# Patient Record
Sex: FEMALE | Race: WHITE | NOT HISPANIC OR LATINO | Employment: OTHER | URBAN - METROPOLITAN AREA
[De-identification: names, ages, dates, MRNs, and addresses within clinical notes are randomized per-mention and may not be internally consistent; named-entity substitution may affect disease eponyms.]

---

## 2017-04-20 ENCOUNTER — OFFICE VISIT (OUTPATIENT)
Dept: OPHTHALMOLOGY | Facility: CLINIC | Age: 72
End: 2017-04-20
Payer: MEDICARE

## 2017-04-20 DIAGNOSIS — H25.013 CATARACT CORTICAL, SENILE, BILATERAL: ICD-10-CM

## 2017-04-20 DIAGNOSIS — E11.9 DIABETES MELLITUS TYPE 2 WITHOUT RETINOPATHY: Primary | ICD-10-CM

## 2017-04-20 DIAGNOSIS — Z13.5 GLAUCOMA SCREENING: ICD-10-CM

## 2017-04-20 DIAGNOSIS — H52.7 REFRACTIVE ERROR: ICD-10-CM

## 2017-04-20 DIAGNOSIS — H53.001 AMBLYOPIA, RIGHT: ICD-10-CM

## 2017-04-20 PROCEDURE — 92015 DETERMINE REFRACTIVE STATE: CPT | Mod: S$GLB,,, | Performed by: OPTOMETRIST

## 2017-04-20 PROCEDURE — 99999 PR PBB SHADOW E&M-EST. PATIENT-LVL I: CPT | Mod: PBBFAC,,, | Performed by: OPTOMETRIST

## 2017-04-20 PROCEDURE — 92014 COMPRE OPH EXAM EST PT 1/>: CPT | Mod: S$GLB,,, | Performed by: OPTOMETRIST

## 2017-04-20 NOTE — PROGRESS NOTES
HPI     Last [facility] exam 08/25/2015  Diabetic/NIDDM  Amblyopia, OD  Cataract cortical, senile, bilateral  Cataract, nuclear, bilateral  Screening for glaucoma  RE  No visual complaints  Uses OTC Readers +3.00       Last edited by Sebastian Rodrigues MA on 4/20/2017  9:31 AM.         Assessment /Plan     For exam results, see Encounter Report.    Diabetes mellitus type 2 without retinopathy    Cataract, nuclear, bilateral    Cataract cortical, senile, bilateral    Amblyopia, right    Glaucoma screening    Refractive error      No diabetic retinopathy OU.  Moderate NS/cortical cataracts OU without complaint = discussed and will follow.  OD = amblyopia.  OH OK OU otherwise.  Spec Rx given.  RTC one year.

## 2017-06-05 ENCOUNTER — TELEPHONE (OUTPATIENT)
Dept: PODIATRY | Facility: CLINIC | Age: 72
End: 2017-06-05

## 2017-06-05 DIAGNOSIS — M79.671 RIGHT FOOT PAIN: Primary | ICD-10-CM

## 2017-06-05 NOTE — TELEPHONE ENCOUNTER
Returned 's call regarding her wanting to be seen for right foot pain (top of foot to her toes). I informed  that there are not any openings today and we are at [place] today and [place] tomorrow.  requested that she see someone else. After checking the schedules for other providers I informed  that there is an opening on tomorrow with  at 1:20PM.  stated she will go tomorrow and she will get an Xray before her appointment.  stated understanding and the call ended well.

## 2017-06-05 NOTE — TELEPHONE ENCOUNTER
----- Message from Vandana Lovelace sent at 6/5/2017  8:32 AM CDT -----  Contact: kathy   Feels like she needs to be seen today   Call back

## 2017-06-06 ENCOUNTER — HOSPITAL ENCOUNTER (OUTPATIENT)
Dept: RADIOLOGY | Facility: HOSPITAL | Age: 72
Discharge: HOME | End: 2017-06-06
Attending: PODIATRIST
Payer: MEDICARE

## 2017-06-06 ENCOUNTER — OFFICE VISIT (OUTPATIENT)
Dept: PODIATRY | Facility: CLINIC | Age: 72
End: 2017-06-06
Payer: MEDICARE

## 2017-06-06 VITALS
WEIGHT: 172.63 LBS | HEIGHT: 66 IN | HEART RATE: 84 BPM | SYSTOLIC BLOOD PRESSURE: 131 MMHG | BODY MASS INDEX: 27.74 KG/M2 | DIASTOLIC BLOOD PRESSURE: 74 MMHG

## 2017-06-06 DIAGNOSIS — M20.11 HALLUX ABDUCTO VALGUS, RIGHT: ICD-10-CM

## 2017-06-06 DIAGNOSIS — M19.079 ARTHRITIS, MIDFOOT: Primary | ICD-10-CM

## 2017-06-06 DIAGNOSIS — M79.671 RIGHT FOOT PAIN: ICD-10-CM

## 2017-06-06 PROCEDURE — 1125F AMNT PAIN NOTED PAIN PRSNT: CPT | Mod: S$GLB,,, | Performed by: PODIATRIST

## 2017-06-06 PROCEDURE — 73630 X-RAY EXAM OF FOOT: CPT | Mod: 26,RT,, | Performed by: RADIOLOGY

## 2017-06-06 PROCEDURE — 99999 PR PBB SHADOW E&M-EST. PATIENT-LVL IV: CPT | Mod: PBBFAC,,, | Performed by: PODIATRIST

## 2017-06-06 PROCEDURE — 1159F MED LIST DOCD IN RCRD: CPT | Mod: S$GLB,,, | Performed by: PODIATRIST

## 2017-06-06 PROCEDURE — 99214 OFFICE O/P EST MOD 30 MIN: CPT | Mod: S$GLB,,, | Performed by: PODIATRIST

## 2017-06-06 RX ORDER — METHYLPREDNISOLONE 4 MG/1
TABLET ORAL
Qty: 1 PACKAGE | Refills: 0 | Status: SHIPPED | OUTPATIENT
Start: 2017-06-06 | End: 2018-01-09

## 2017-06-06 RX ORDER — TRAMADOL HYDROCHLORIDE 50 MG/1
TABLET ORAL
Refills: 0 | COMMUNITY
Start: 2017-04-24 | End: 2017-06-06

## 2017-06-06 NOTE — PATIENT INSTRUCTIONS
GOUT      What Is Gout?  Gout is a disorder that results from the build-up of uric acid in the tissues or a joint. It most often affects the joint of the big toe.  Causes  Gout attacks are caused by deposits of crystallized uric acid in the joint. Uric acid is present in the blood and eliminated in the urine, but in people who have gout, uric acid accumulates and crystallizes in the joints. Uric acid is the result of the breakdown of purines, chemicals that are found naturally in our bodies and in food. Some people develop gout because their kidneys have difficulty eliminating normal amounts of uric acid, while others produce too much uric acid.  Gout occurs most commonly in the big toe because uric acid is sensitive to temperature changes. At cooler temperatures, uric acid turns into crystals. Since the toe is the part of the body that is farthest from the heart, its also the coolest part of the body - and, thus, the most likely target of gout. However, gout can affect any joint in the body.  The tendency to accumulate uric acid is often inherited. Other factors that put a person at risk for developing gout include: high blood pressure, diabetes, obesity, surgery, chemotherapy, stress, and certain medications and vitamins. For example, the bodys ability to remove uric acid can be negatively affected by taking aspirin, some diuretic medications (water pills), and the vitamin niacin (also called nicotinic acid). While gout is more common in men aged 40 to 60 years, it can occur in younger men as well as in women.  Consuming foods and beverages that contain high levels of purines can trigger an attack of gout. Some foods contain more purines than others and have been associated with an increase of uric acid, which leads to gout. You may be able to reduce your chances of getting a gout attack by limiting or avoiding shellfish, organ meats (kidney, liver, etc.), red wine, beer, and red meat.  Symptoms  An attack of  gout can be miserable, marked by the following symptoms:  Intense pain that comes on suddenly - often in the middle of the night or upon arising   Signs of inflammation such as redness, swelling, and warmth over the joint.   Diagnosis  To diagnose gout, the foot and ankle surgeon will ask questions about your personal and family medical history, followed by an examination of the affected joint. Laboratory tests and x-rays are sometimes ordered to determine if the inflammation is caused by something other than gout.  Treatment  Initial treatment of an attack of gout typically includes the following:  Medications. Prescription medications or injections are used to treat the pain, swelling, and inflammation.   Dietary restrictions. Foods and beverages that are high in purines should be avoided, since purines are converted in the body to uric acid.   Fluids. Drink plenty of water and other fluids each day, while also avoiding alcoholic beverages, which cause dehydration.   Immobilize and elevate the foot. Avoid standing and walking to give your foot a rest. Also, elevate your foot (level with or slightly above the heart) to help reduce swelling.   The symptoms of gout and the inflammatory process usually resolve in three to ten days with treatment. If gout symptoms continue despite the initial treatment, or if repeated attacks occur, see your primary care physician for maintenance treatment that may involve daily medication. In cases of repeated episodes, the underlying problem must be addressed, as the build-up of uric acid over time can cause arthritic damage to the joint.  Once diagnosed with gout, your doctor will recommend that you begin following a uric acid diet. Consuming a well-balanced diet, containing moderate amounts of all the main food groups, means you wont experience high uric acid levels, unless your kidneys are incapable of filtering the blood properly.  Receiving around 600 to 1000 milligrams of  puric acid from foods is considered a healthy amount for most people. However, overconsumption of certain foods may produce hyperuricemia without you being aware of it.  Foods high in puric acid that you should not include in a low purine diet are:     Anchovies, bakers and watkinss yeast, animal organs, sardines, Boletus mushrooms and gravies--more than 800 milligrams per three ounces  Foods containing up to 200 milligrams of puric acid per 3.5 ounces:  Turkey   Ham   Chicken   Bañuelos   Oatmeal   Spinach   Asparagus   Cauliflower   Navy and kidney beans   Pork   [place]   Oysters   Sunflower seeds   Brown shrimp   Sausage   Those who do not experience problems metabolizing uric acid or with kidney filtering functioning generally do not have to worry about the amount of high-purine foods they consume (up to a point, of course). However, those afflicted with extreme hyperuricemia need to adhere to a strict uric acid diet.  Foods containing amino acids (glycine, leucine, alanine, aspartic acid and glutamic acid) facilitate excretion of uric acid through the kidneys. Dairy foods and molasses, both part of a low purine diet, contain aspartic acid. Glutamic acid, produced by the body, assists in potassium transportation across the blood-brain barrier as well as to other parts of the body.  Low purine diet foods provide chemicals necessary in manufacturing glutamic acid within the body. Beans, nuts and whole wheat, all foods found in low purine foods, contain leucine, another essential amino acid contributing to decreased attacks of gouts by enhancing bone and muscle mass.  Low Purine Diet for Gout  All low-fat dairy products--although recent research discovered that including larger than normal amounts of low-fat dairy products in a diet decreases instances of gout by more than half, scientists are not certain why these foods produce this effect.  A list of other low-fat, low purine diet foods include:  Pudding   Peanut  butter   Low-fiber breads   Rice   Pasta   Low-fat cheese and ice cream   Soups containing no broth or meat extract   Sweet items in limited amounts   Fruits and vegetables   Red and white cabbage   Luncheon meat   Green olives   Sauerkraut   Tofu   [place] and hazelnuts   Figs   Be aware that breads, cereals and crackers advertising as being 100% wheat, stone-ground or multi-grain are generally not whole grain foods and may contain ingredients such as yeast, which increases uric acid levels and promotes formation of urate crystals.  Purchase only lean cuts of meat containing as little fillers or fat as possible when creating your low purine diet meal plan. In addition, poultry should be cooked and eaten without the skin, since the fattiest part of chicken or turkey is the skin. Instead of frying meat, bake, poach, broil or grill meat. If you prefer, meat substitutes are acceptable to include in a gout diet, such as those made with soybeans, egg substitutes, vegetable-protein tofu or legumes.    Importance of Cherries in a Low Purine Diet     Cherry juice and tart cherries (the [place] and [place] kind that are grown in [place]), contain potassium, an essential mineral necessary for optimal kidney functioning and regulating blood pressure. Because potassium is essential, this indicates that the body is incapable of chemically altering other substances to create potassium. Therefore, humans must then obtain potassium from other sources, such as cherries, to maintain good health.  Tart cherries and cherry juice keep urine pH at a slightly alkaline level, which inhibits accumulation of uric acid in the blood and consequential formation of uric acid crystals in joints. Cherries should definitely be a part of a low purine diet.  In addition, drinking cherry juice for gout reduces uric acid levels due to the presence of anthocyanins in the cherries. Extracted from berries and grapes, anthocyanin is an  antioxidant and bioflavonoid that contains antimicrobial, anti-inflammatory properties enhancing blood vessel and platelet functioning.   Gout sufferers should eat eight ounces or more of tart cherries each day to relieve pain and swelling of gout. Drinking the same amount of cherry juice instead of eating cherries will also significantly reduce inflammation.  Mulberries     Mulberries should also be included in a low purine diet because they contain healthy amounts of [place], a flavonoid attributed to relieving pain from inflammation. Although mulberries do not directly reduce the high levels of uric acid in a gout sufferers blood stream, they do alleviate pain as incidences of gout flare-ups are being treated.  In addition, mulberries also enhance capillary strength, which is often compromised when joints are inflamed. According to the book Vitamins and Minerals Demystified by Dr. Todd Reynolds since ascorbic acid slightly increases capillary fragility, this action of the bioflavonoids offsets this tendency. Consuming mulberries while on a low purine diet will counteract this issue precipitated by fruits being such an integral part of gout diet.  Other Flavonoid-Rich Foods  Gout sufferers who are sensitive to over the counter pain relievers such as ibuprofen or aspirin may find natural relief from flavonoid-rich, low purine diet foods such as:  Blueberries   Blackberries   Jacob and limes   Dark-colored beans   Cranberries   Green and red vegetables   Red and green onions   Effects of Coffee and Tea on Gout     Drinking coffee but not tea seems to correlate with a subsequent reduction of blood uric acid according to a report published in the June 2007 issue of Journal of Arthritis Care and Research. Researchers interviewed over 14,000 subjects regarding coffee and tea drinking habits and later evaluated their uric acid levels according to whether they were predominantly coffee or tea drinkers, or drank neither  one.  Those who drank four to five cups of coffee each day as part of a low purine diet indicated a decrease of 0.26 mg/dl serum uric acid in contrast to those who did not drink coffee at all.   Those who drank six cups of coffee per day experienced uric acid level reductions of 0.43 mg/dl.   Interestingly, researchers also investigated the effects of caffeine contained in beverages other than coffee and found drinks such as soda did not influence uric acid levels one way or the other. This may indicate that coffee contains a unique ingredient or property responsible for decreasing uric acid and alleviating gout symptoms.  Fish Oil as Part of a Low Purine Diet       A substantial amount of research has been accomplished regarding the strong anti-inflammatory properties of fish oil, or omega 3 fatty acids. By facilitating the production of prostanglandins, hormones responsible for reducing inflammation and regulating calcium dispersion, fish oil is a necessary part of a successful low purine diet intended to combat the pain of gout.  In addition, prostanglandins inhibit particular pro-inflammatory hormones that are part of the prostanglandin family. While fish oil wont relieve pain as quickly as NSAIDs (non-steroidal anti-inflammatory drugs such as aspirin or ibuprofen), they effectively work to reduce the presence of prostanglandins over longer periods of time, eliminating the need to take NSAIDs which often cause side effects.  Frequently, insulin resistance (metabolic syndrome), heart disease and kidney stones are co-morbidly occurring diseases with gout. Fish oil supplements taken in conjunction with a low purine diet can benefit these diseases as well by decreasing bad fats (triglycerides) and preventing platelet aggregation, which is a common cause of blocked vessels and heart attacks.   A few foods other than fish contain omega 3, which are also foods that can be included in a gout diet. Walnuts, flaxseeds,  flaxseed oil and eggs are all sources of omega-3 that do not contribute to uric acid levels in the body.   Folic Acid   Folic acid effectively neutralizes an enzyme called xanthine oxidase which is directly responsible for producing uric acid. By also regulating any uric acid already present in the blood, gout attacks are usually shorter and less painful. However, the recommended daily amounts of folic acid fall short of the 80 milligrams a day that is required to decrease blood uric acid. In addition, epileptics who take medication may find that folic acid interferes with the efficacy of that medication. Little research has been done regarding the effect of high doses of folic acid so including folic acid supplements in a low purine diet should be implemented under the supervision of a doctor.  When following a low purine diet for gout, remember to increase liquids to at least ten, eight ounce servings of water or juice daily. Liquids facilitate the elimination of uric acid by assisting the kidneys in the process of filtering and disposing of wastes.  Suggested Low Purine Diet Menus for Gout Diet  Diets to reduce uric acid are not as restrictive as diabetic or even weight management diets. However, if you are accustomed to eating high purine foods or drinking alcohol on a regular basis, this diet may represent a drastic change in your eating habits. However, to avoid painful attacks of gout that could eventually result in irreversible damage to joints and lifelong impairment of movement, reducing uric acid levels should be a priority in your life.  Low Purine Diet for Gout  This type of diet should include daily servings of:  Grain products--six to ten servings   Fruits--two to four servings   Vegetables--three servings   Low-fat dairy products--two servings   Protein-rich foods-one egg, one ounce of cheese, three ounces of cooked, low-purine meat and two tablespoons of peanut butter   When consuming foods included  in a uric acid diet you may eat as much as you like, as long as this does not contribute to weight gain. This poses a problem since many low purine foods such as white breads, pasta and cereals contain high levels of sugar and carbohydrates. A good meal plan to follow to reduce uric acid in the body would be:   Breakfast:  Bowl of cereal such as cornflakes or crisp rice   White bread toast, buttered with olive oil spread   Glass of skim milk   Cup of tea or coffee or small glass of cherry juice   Snack:   Low-fat cheese and saltines or grapes   Lunch:   Sliced meat sandwich (ham, chicken or turkey) on white bread or   Peanut butter sandwich on white bread   Fruit salad   Coffee, tea, water or cherry juice   Small slice of white cake or two peanut butter or sugar cookies   Dinner:   Grilled chicken breast   Pasta or rice   Carrots, cauliflower or asparagus   Water or cherry juice   Pudding made with low-fat milk   Snack:   Fruit chunks   Fresh vegetable mixture   Fresh berries   Many variations are possible with this uric acid diet. However, even though gout symptoms decrease, you need to follow a low purine diet as closely as possible to maintain successful control over uric acid levels.  How Much Fat in a Low Purine Diet?  While eating moderate to excessive amounts of saturated fat is detrimental to everyones health, it is especially damaging to gout sufferers because fat is directly correlated with weight gain, heart disease and exacerbated gout symptoms. However, small quantities of fat are necessary for optimal functioning of the nervous system as myelin, a form of fat, is essential for optimal signal transduction in the brain enhancing cognition abilities.  For this reason, gout sufferers should have one serving of one of the following per day:  2 tablespoons of half and half cream   1 tablespoon of cream cheese   1 teaspoon of butter   1 tablespoon of reduced fat mayonnaise   1 tablespoon of whole sesame  seeds   1 slice of diggs   1 tablespoon of sunflower or pumpkin seeds   1 teaspoon of trans fat-free margarine   Benefits of Dark Chocolate and Cocoa Powder  Research has discovered that chocolate and cocoa powder, especially dark chocolate, contains high amounts of flavonoids when compared to other foods rich in antioxidants. For this reason, an occasional small piece or two of dark chocolate or cup of cocoa should be included in a low purine diet. In fact, cocoa powder possesses the highest amount as a member of the chocolate family, containing ten times the amount of flavonoids found in cranberries and strawberries.  Chocolate is low in purines as well, but dont eat it excessively as it is high in empty calories and promotes weight gain unless you engage in regular exercise.   Prescription Treatments for Gout and Uric Acid Conditions  While a low purine diet for gout is one of the best defensive measures which those suffering from gout can pursue, physicians provide drugs to assist in alleviating gout symptoms. The principle medication used in treating gout is colchicine, a recently FDA approved medication naturally found in plants belong to the Colchicum family.  However, colchicine is known to produce side effects such as gastrointestinal disturbances and neutropenia, which is a reduction in a vital type of white blood cell. Doses must be closely monitored, as too high of a dose can profoundly weaken bone marrow and induce anemia. Colchicine toxicity is similar to arsenic poisoning, resulting in damage and failure of internal organs within 72 hours of ingestion.  Prognosis and Summary  A gout attack or flare-up generally diminishes within five to seven days without treatment, depending on severity of the attack and amount of uric acid in the blood. More than half of these will suffer another attack within twelve months of experiencing the first attack, unless treatment is pursued and a low purine diet is  adopted.  It is also important to note that developing gout also puts you at risk for diabetes mellitus, metabolic syndrome, hypertension, renal failure and cardiovascular disease. While some of these disorders may partly result from obesity or insulin resistance issues, the majority are a direct consequence of untreated hyperuricemia.  Destruction of joints is another consequence of elevated uric acid levels and continued accumulation of urate acid crystals within joints. Implementing gout prevention measures as quickly as possible is vital to eliminating uric acid from the blood and reducing the pain and swelling of gout.

## 2017-06-06 NOTE — PROGRESS NOTES
[facility]  PODIATRIC MEDICINE AND SURGERY  PROGRESS NOTE  6/6/2017    PODIATRY NOTE  PCP: Dr. Irma Ro MD    CHIEF COMPLAINT   Chief Complaint   Patient presents with    Foot Pain     Pain in dorsal aspect of right foot rated 10/10 currently       HPI  Tamy Allan is a 71 y.o. female who has a past medical history of Amblyopia; Arthritis; Cataract; Diabetes mellitus, type 2; Encounter for blood transfusion; Hyperlipidemia; Hypertension; and Refractive error.   Tamy presents to clinic today complaining of right foot pain.    Patient describes pain as:   Location: dorsum of right foot   Quality: throbbing   Severity:10/10  Duration: four day  Modifying Factors (Aggravating): weight bearing, pt states she woke up over weekend and had significant pain to top of her right foot with swelling and redness    She also complains about bunion pain. She did have bunionectomy surgery in 2015, but she states bunion is still present. She modifies pain with offloading.   Modifying Factors (Alleviating): tylenol     Patient denies other pedal complaints at this time.    No results found for: HGBA1C    PMH  Past Medical History:   Diagnosis Date    Amblyopia     OD    Arthritis     Cataract     Diabetes mellitus, type 2     Encounter for blood transfusion     Hyperlipidemia     Hypertension     Refractive error        PROBLEM LIST  Patient Active Problem List    Diagnosis Date Noted    Arthritis, midfoot - Right Foot 06/06/2017    HAV (hallux abducto valgus) 09/18/2015    Diabetes mellitus 07/09/2014       MEDS  Current Outpatient Prescriptions on File Prior to Visit   Medication Sig Dispense Refill    amlodipine (NORVASC) 10 MG tablet Take 10 mg by mouth once daily.      aspirin 81 MG Chew Take 81 mg by mouth once daily.      atorvastatin (LIPITOR) 40 MG tablet Take 40 mg by mouth once daily.      CALCIUM CARB/VIT D3/MINERALS (CALTRATE PLUS ORAL) Take by mouth.      carvedilol  (COREG) 25 MG tablet Take 25 mg by mouth 2 (two) times daily with meals.      CETIRIZINE HCL (ZYRTEC ORAL) Take by mouth.      CYANOCOBALAMIN, VITAMIN B-12, (VITAMIN B-12 ORAL) Take by mouth.      fluticasone 50 mcg/actuation DsDv Inhale into the lungs.      FLUZONE HIGH-DOSE 2015-16, PF, 180 mcg/0.5 mL Syrg   0    hydrALAZINE (APRESOLINE) 10 MG tablet Take 10 mg by mouth 3 (three) times daily.      hydrochlorothiazide (HYDRODIURIL) 25 MG tablet Take 25 mg by mouth once daily.      levothyroxine (SYNTHROID) 50 MCG tablet Take 50 mcg by mouth once daily.      meloxicam (MOBIC) 7.5 MG tablet       metformin (GLUCOPHAGE-XR) 500 MG 24 hr tablet       montelukast (SINGULAIR) 10 mg tablet Take 10 mg by mouth every evening.      MOVIPREP 100-7.5-2.691 gram PwPk   0    MULTIVITAMIN W-MINERALS/LUTEIN (CENTRUM SILVER ORAL) Take by mouth.      mupirocin (BACTROBAN) 2 % ointment   3    oxycodone-acetaminophen (PERCOCET) 5-325 mg per tablet Take 1 tablet by mouth every 4 (four) hours as needed for Pain. 30 tablet 0    quinapril (ACCUPRIL) 40 MG tablet Take 40 mg by mouth 2 (two) times daily.       [DISCONTINUED] metformin (GLUMETZA) 500 MG (MOD) 24 hr tablet Take 500 mg by mouth daily with breakfast.       No current facility-administered medications on file prior to visit.        Medication List with Changes/Refills   New Medications    METHYLPREDNISOLONE (MEDROL DOSEPACK) 4 MG TABLET    use as directed   Current Medications    AMLODIPINE (NORVASC) 10 MG TABLET    Take 10 mg by mouth once daily.    ASPIRIN 81 MG CHEW    Take 81 mg by mouth once daily.    ATORVASTATIN (LIPITOR) 40 MG TABLET    Take 40 mg by mouth once daily.    CALCIUM CARB/VIT D3/MINERALS (CALTRATE PLUS ORAL)    Take by mouth.    CARVEDILOL (COREG) 25 MG TABLET    Take 25 mg by mouth 2 (two) times daily with meals.    CETIRIZINE HCL (ZYRTEC ORAL)    Take by mouth.    CYANOCOBALAMIN, VITAMIN B-12, (VITAMIN B-12 ORAL)    Take by mouth.     FLUTICASONE 50 MCG/ACTUATION DSDV    Inhale into the lungs.    FLUZONE HIGH-DOSE 2015-16, PF, 180 MCG/0.5 ML SYRG        HYDRALAZINE (APRESOLINE) 10 MG TABLET    Take 10 mg by mouth 3 (three) times daily.    HYDROCHLOROTHIAZIDE (HYDRODIURIL) 25 MG TABLET    Take 25 mg by mouth once daily.    LEVOTHYROXINE (SYNTHROID) 50 MCG TABLET    Take 50 mcg by mouth once daily.    MELOXICAM (MOBIC) 7.5 MG TABLET        METFORMIN (GLUCOPHAGE-XR) 500 MG 24 HR TABLET        MONTELUKAST (SINGULAIR) 10 MG TABLET    Take 10 mg by mouth every evening.    MOVIPREP 100-7.5-2.691 GRAM PWPK        MULTIVITAMIN W-MINERALS/LUTEIN (CENTRUM SILVER ORAL)    Take by mouth.    MUPIROCIN (BACTROBAN) 2 % OINTMENT        OXYCODONE-ACETAMINOPHEN (PERCOCET) 5-325 MG PER TABLET    Take 1 tablet by mouth every 4 (four) hours as needed for Pain.    QUINAPRIL (ACCUPRIL) 40 MG TABLET    Take 40 mg by mouth 2 (two) times daily.    Discontinued Medications    METFORMIN (GLUMETZA) 500 MG (MOD) 24 HR TABLET    Take 500 mg by mouth daily with breakfast.    TRAMADOL (ULTRAM) 50 MG TABLET    TK 1 T PO  BID PRN P       PSH     Past Surgical History:   Procedure Laterality Date    CARDIAC CATHETERIZATION      with stents    HYSTERECTOMY          ALL  Review of patient's allergies indicates:   Allergen Reactions    Aspirin      Patient states that she has been taking aspirin regularly with no reactions       SOC     Social History   Substance Use Topics    Smoking status: Never Smoker    Smokeless tobacco: Not on file    Alcohol use No         FAMILY HX  History reviewed. No pertinent family history.         REVIEW OF SYSTEMS  Review of Systems   Constitutional: Negative for chills, fever and weight loss.   Respiratory: Positive for cough. Negative for shortness of breath.    Cardiovascular: Negative for claudication.   Musculoskeletal: Positive for joint pain.   Neurological: Negative for dizziness and tingling.         PHYSICAL EXAM  Vitals:    06/06/17  "1323   BP: 131/74   Pulse: 84   Weight: 78.3 kg (172 lb 9.9 oz)   Height: 5' 6" (1.676 m)   PainSc: 10-Worst pain ever   PainLoc: Foot       General: This patient is well-developed, well-nourished and appears stated age, well-oriented to person, place and time, and cooperative and pleasant on today's visit      LOWER EXTREMITY  Vascular exam:   · Dorsalis pedis and posterior tibial pulses palpable 2/4 bilaterally.   · Capillary refill time immediate to the toes.   · Feet are warm to the touch. Skin temperature warm to warm from proximally to distally   · There are varicosities, telangiectasias noted to bilateral foot and ankle regions.   · There areno ecchymoses noted to bilateral foot and ankle regions.   · There is mild dorsal right foot gross lower extremity edema.    Dermatologic exam:   · Skin moist with healthy texture and turgor.  · There are no open ulcerations, lacerations, or fissures to bilateral foot and ankle regions. There are no signs of infection as there is no erythema, no proximal-extending lymphangiitis, no fluctuance, or crepitus noted on palpation to bilateral foot and ankle regions.   · There is no interdigital maceration.   · There are no hyperkeratotic lesions noted to feet. Nails are well-trimmed.    Neurologic exam:  · Epicritic sensation is intact as the patient is able to sense light touch to bilateral foot and ankle regions.   · Achilles and patellar deep tendon reflexes intact  · Babinski reflex absent    Musculoskeletal/Orthopedic exam:   · There is pain with palpation of palpable prominence dorsum of right midfoot'  · There is mild prominence medial eminence with negative pain with palpation  · There is abnormal HAV right  · Muscle strength AT/EHL/EDL/PT: 5/5; Achilles/Gastroc/Soleus: 5/5; PB/PL: 5/5 Muscle tone is normal.  · Ankle joint ROM  B/L supple DF/PF, non-crepitus  · STJ ROM supple inv/ev, non crepitus     IMAGING   Reviewed by me and I agree with radiologist findings, 3 " views of foot/ankle, reveal:  No results found for this or any previous visit.        No results found for this or any previous visit.    No results found for this or any previous visit.     Results for orders placed during the hospital encounter of 06/06/17   X-Ray Foot Complete Right    Narrative Technique: AP, lateral, and oblique views were obtained of the right foot    Comparison: 10/15/2015    Findings: Postoperative changes again noted involving the distal 1st metatarsal.  There is suspected interval worsening hallux valgus deformity.  Moderate degenerative findings again noted involving the great toe MTP joint and scattered throughout the midfoot. No acute fractures or dislocations visualized.  No erosive osseous changes demonstrated.    Impression As above.             Electronically signed by: JOSE HELLER MD  Date:     06/06/17  Time:    12:46            ASSESSMENT  Encounter Diagnoses   Name Primary?    Hallux abducto valgus, right     Arthritis, midfoot - Right Foot Yes         PLAN  1. Patient was educated about clinical and imaging findings, and verbalizes understanding of above.  Arthritis, midfoot - Right Foot  -     Uric acid; Future; Expected date: 06/06/2017    Hallux abducto valgus, right    Other orders  -     methylPREDNISolone (MEDROL DOSEPACK) 4 mg tablet; use as directed  Dispense: 1 Package; Refill: 0      2. Treatment plan: discussed midfoot arthritis and treatment option with shoe modification, rockerbottom sole, Rx Medrol dosepak; discussed injection if symptoms fail to resolve or topical compound. Surgery as last resort  Reviewed xray images with patient and discussed indepth bunion deformity. Pain is intermittent and based on age and comorbidities I recommend shoe modification, orthoses, and palliative treatment     Will order Uric Acid ddx, gout  3. RTC  for follow up/evaluation as scheduled       Future Appointments  Date Time Provider Department Center   6/27/2017 11:40 AM  Ileana Nayak DPM [facility] POD [place]       Report Electronically Signed By:  Ileana Nayak DPM   Podiatric Medicine & Surgery  [facility]  6/6/2017

## 2017-06-07 ENCOUNTER — TELEPHONE (OUTPATIENT)
Dept: PODIATRY | Facility: CLINIC | Age: 72
End: 2017-06-07

## 2017-06-07 NOTE — TELEPHONE ENCOUNTER
Educated patient on gout labs reviewed by Dr. Nael DPM. Verbalized understanding. Encouraged to continue medication prescribed at last appointment and to follow up as recommended. Verbalized understanding.

## 2017-06-07 NOTE — TELEPHONE ENCOUNTER
pls call Mrs. Allan and inform her gout lab result is high. She is experiencing a gout attack and arthritis on her foot. The medication that I prescribed will help with her pain. She should refer to the gout information sheet that was provided to her at her visit. It was nice meeting her! I will see her in 2 weeks

## 2017-06-14 ENCOUNTER — TELEPHONE (OUTPATIENT)
Dept: PODIATRY | Facility: CLINIC | Age: 72
End: 2017-06-14

## 2017-06-14 NOTE — TELEPHONE ENCOUNTER
Returned pt's call, pt wanted to know if she can be prescribed another medication due to minor pain she is experiencing after taking steroid dose pack, pt was informed she will have to follow up at next appt to see treatment options for gout. Pt was offered sooner appt, pt declined stated she will call for sooner appt if pain gets worse. Pt expressed understanding, call ended pleasantly.  María Neo MA  Office of Dr. Ileana Nayak, DPM   Podiatry Department, [facility]

## 2017-06-14 NOTE — TELEPHONE ENCOUNTER
----- Message from Martha Taylor sent at 6/14/2017  9:07 AM CDT -----  Contact: pt  Please call pt at [phone] re she has finished the med she was given for her pain and wants to know what she can take now?

## 2017-06-27 ENCOUNTER — OFFICE VISIT (OUTPATIENT)
Dept: PODIATRY | Facility: CLINIC | Age: 72
End: 2017-06-27
Payer: MEDICARE

## 2017-06-27 VITALS
SYSTOLIC BLOOD PRESSURE: 114 MMHG | WEIGHT: 171.94 LBS | BODY MASS INDEX: 27.63 KG/M2 | HEART RATE: 82 BPM | DIASTOLIC BLOOD PRESSURE: 67 MMHG | HEIGHT: 66 IN

## 2017-06-27 DIAGNOSIS — M19.079 ARTHRITIS, MIDFOOT: ICD-10-CM

## 2017-06-27 DIAGNOSIS — M10.071 ACUTE IDIOPATHIC GOUT OF RIGHT FOOT: Primary | ICD-10-CM

## 2017-06-27 PROCEDURE — 1125F AMNT PAIN NOTED PAIN PRSNT: CPT | Mod: S$GLB,,, | Performed by: PODIATRIST

## 2017-06-27 PROCEDURE — 20605 DRAIN/INJ JOINT/BURSA W/O US: CPT | Mod: RT,S$GLB,, | Performed by: PODIATRIST

## 2017-06-27 PROCEDURE — 1159F MED LIST DOCD IN RCRD: CPT | Mod: S$GLB,,, | Performed by: PODIATRIST

## 2017-06-27 PROCEDURE — 99213 OFFICE O/P EST LOW 20 MIN: CPT | Mod: 25,S$GLB,, | Performed by: PODIATRIST

## 2017-06-27 PROCEDURE — 99999 PR PBB SHADOW E&M-EST. PATIENT-LVL IV: CPT | Mod: PBBFAC,,, | Performed by: PODIATRIST

## 2017-06-27 RX ADMIN — TRIAMCINOLONE ACETONIDE 20 MG: 40 INJECTION, SUSPENSION INTRA-ARTICULAR; INTRAMUSCULAR at 01:06

## 2017-06-27 NOTE — PROGRESS NOTES
"[facility]   PODIATRIC MEDICINE AND SURGERY  PROGRESS NOTE  7/3/2017    PODIATRY NOTE  PCP: Dr. Irma Ro MD    CHIEF COMPLAINT   Chief Complaint   Patient presents with    Follow-up     F/U right midfoot arthritis. Verbalizes relief since last visit       HPI  Tamy Allan is a 71 y.o. female who has a past medical history of Amblyopia; Arthritis; Cataract; Diabetes mellitus, type 2; Encounter for blood transfusion; Hyperlipidemia; Hypertension; and Refractive error.   Tamy presents to clinic today complaining of right foot pain. Pt states she has had relief since steroid oral medication. Gout lab highly abnormal . She will follow up with PCP for long term gout management. She notes mild pain still present with ambulation across top of right foot.     Patient denies other pedal complaints at this time.    No results found for: HGBA1C     REVIEW OF SYSTEMS  Review of Systems   Constitutional: Negative for chills, fever and weight loss.   Respiratory: Positive for cough. Negative for shortness of breath.    Cardiovascular: Negative for claudication.   Musculoskeletal: Positive for joint pain.   Neurological: Negative for dizziness and tingling.         PHYSICAL EXAM  Vitals:    06/27/17 1144   BP: 114/67   Pulse: 82   Weight: 78 kg (171 lb 15.3 oz)   Height: 5' 6" (1.676 m)   PainSc:   6   PainLoc: Foot       General: This patient is well-developed, well-nourished and appears stated age, well-oriented to person, place and time, and cooperative and pleasant on today's visit      LOWER EXTREMITY  Vascular exam:   · Dorsalis pedis and posterior tibial pulses palpable 2/4 bilaterally.   · Capillary refill time immediate to the toes.   · Feet are warm to the touch. Skin temperature warm to warm from proximally to distally   · There are varicosities, telangiectasias noted to bilateral foot and ankle regions.   · There areno ecchymoses noted to bilateral foot and ankle regions.   · There is " mild dorsal right foot gross lower extremity edema.    Dermatologic exam:   · Skin moist with healthy texture and turgor.  · There are no open ulcerations, lacerations, or fissures to bilateral foot and ankle regions. There are no signs of infection as there is no erythema, no proximal-extending lymphangiitis, no fluctuance, or crepitus noted on palpation to bilateral foot and ankle regions.   · There is no interdigital maceration.   · There are no hyperkeratotic lesions noted to feet. Nails are well-trimmed.    Neurologic exam:  · Epicritic sensation is intact as the patient is able to sense light touch to bilateral foot and ankle regions.   · Achilles and patellar deep tendon reflexes intact  · Babinski reflex absent    Musculoskeletal/Orthopedic exam:   · There is pain with palpation of palpable prominence dorsum of right midfoot  · There is mild prominence medial eminence with negative pain with palpation  · There is abnormal HAV right  · Muscle strength AT/EHL/EDL/PT: 5/5; Achilles/Gastroc/Soleus: 5/5; PB/PL: 5/5 Muscle tone is normal.  · Ankle joint ROM  B/L supple DF/PF, non-crepitus  · STJ ROM supple inv/ev, non crepitus     IMAGING   Reviewed by me and I agree with radiologist findings, 3 views of foot/ankle, reveal:    Results for orders placed during the hospital encounter of 06/06/17   X-Ray Foot Complete Right    Narrative Technique: AP, lateral, and oblique views were obtained of the right foot    Comparison: 10/15/2015    Findings: Postoperative changes again noted involving the distal 1st metatarsal.  There is suspected interval worsening hallux valgus deformity.  Moderate degenerative findings again noted involving the great toe MTP joint and scattered throughout the midfoot. No acute fractures or dislocations visualized.  No erosive osseous changes demonstrated.    Impression As above.             Electronically signed by: JOSE HELLER MD  Date:     06/06/17  Time:    12:46             ASSESSMENT  Encounter Diagnoses   Name Primary?    Acute idiopathic gout of right foot Yes    Arthritis, midfoot - Right Foot          PLAN  1. Patient was educated about clinical and imaging findings, and verbalizes understanding of above.  Acute idiopathic gout of right foot    Arthritis, midfoot - Right Foot      2. Treatment plan: discussed midfoot arthritis and treatment option with shoe modification, rockerbottom sole, discussed gout diet and AVS instructions on gout triggers; conservative treatment- cherry juice, avoid triggers, and long term management with oral medication    Recommend steroid injection as pain still persistent and causing difficulty with ambulation.      A discussion of the risks, benefits, and alternatives of the corticosteroid injection  occurred.  All questions were answered.  After gaining oral consent and verifying the injection site, the skin was prepped with betadine, then alcohol swabs.  Under aseptic conditions, the midfoot across midtarsal joint was injected with a 1 mL 1:1 mixture of 0.5% Marcaine and kenalog 40. The area was cleansed and then covered with a band-aid . The patient tolerated the injection well and no apparent adverse reactions observed.    A discussion regarding the time course of the medications was undertaken and questions were answered.  Patient counseled to look for signs of infection and if any report to Emergency Department        Future Appointments  Date Time Provider Department Center   7/25/2017 9:40 AM Ileana Nayak DPM [facility] P[facility] [place]       Report Electronically Signed By:  Ileana Nayak DPM   Podiatric Medicine & Surgery  [facility]  7/3/2017

## 2017-06-27 NOTE — PATIENT INSTRUCTIONS
GOUT      What Is Gout?  Gout is a disorder that results from the build-up of uric acid in the tissues or a joint. It most often affects the joint of the big toe.  Causes  Gout attacks are caused by deposits of crystallized uric acid in the joint. Uric acid is present in the blood and eliminated in the urine, but in people who have gout, uric acid accumulates and crystallizes in the joints. Uric acid is the result of the breakdown of purines, chemicals that are found naturally in our bodies and in food. Some people develop gout because their kidneys have difficulty eliminating normal amounts of uric acid, while others produce too much uric acid.  Gout occurs most commonly in the big toe because uric acid is sensitive to temperature changes. At cooler temperatures, uric acid turns into crystals. Since the toe is the part of the body that is farthest from the heart, its also the coolest part of the body - and, thus, the most likely target of gout. However, gout can affect any joint in the body.  The tendency to accumulate uric acid is often inherited. Other factors that put a person at risk for developing gout include: high blood pressure, diabetes, obesity, surgery, chemotherapy, stress, and certain medications and vitamins. For example, the bodys ability to remove uric acid can be negatively affected by taking aspirin, some diuretic medications (water pills), and the vitamin niacin (also called nicotinic acid). While gout is more common in men aged 40 to 60 years, it can occur in younger men as well as in women.  Consuming foods and beverages that contain high levels of purines can trigger an attack of gout. Some foods contain more purines than others and have been associated with an increase of uric acid, which leads to gout. You may be able to reduce your chances of getting a gout attack by limiting or avoiding shellfish, organ meats (kidney, liver, etc.), red wine, beer, and red meat.  Symptoms  An attack of  gout can be miserable, marked by the following symptoms:  Intense pain that comes on suddenly - often in the middle of the night or upon arising   Signs of inflammation such as redness, swelling, and warmth over the joint.   Diagnosis  To diagnose gout, the foot and ankle surgeon will ask questions about your personal and family medical history, followed by an examination of the affected joint. Laboratory tests and x-rays are sometimes ordered to determine if the inflammation is caused by something other than gout.  Treatment  Initial treatment of an attack of gout typically includes the following:  Medications. Prescription medications or injections are used to treat the pain, swelling, and inflammation.   Dietary restrictions. Foods and beverages that are high in purines should be avoided, since purines are converted in the body to uric acid.   Fluids. Drink plenty of water and other fluids each day, while also avoiding alcoholic beverages, which cause dehydration.   Immobilize and elevate the foot. Avoid standing and walking to give your foot a rest. Also, elevate your foot (level with or slightly above the heart) to help reduce swelling.   The symptoms of gout and the inflammatory process usually resolve in three to ten days with treatment. If gout symptoms continue despite the initial treatment, or if repeated attacks occur, see your primary care physician for maintenance treatment that may involve daily medication. In cases of repeated episodes, the underlying problem must be addressed, as the build-up of uric acid over time can cause arthritic damage to the joint.  Once diagnosed with gout, your doctor will recommend that you begin following a uric acid diet. Consuming a well-balanced diet, containing moderate amounts of all the main food groups, means you wont experience high uric acid levels, unless your kidneys are incapable of filtering the blood properly.  Receiving around 600 to 1000 milligrams of  puric acid from foods is considered a healthy amount for most people. However, overconsumption of certain foods may produce hyperuricemia without you being aware of it.  Foods high in puric acid that you should not include in a low purine diet are:     Anchovies, bakers and watkinss yeast, animal organs, sardines, Boletus mushrooms and gravies--more than 800 milligrams per three ounces  Foods containing up to 200 milligrams of puric acid per 3.5 ounces:  Turkey   Ham   Chicken   Ba[place]uelos   Oatmeal   Spinach   Asparagus   Cauliflower   Navy and kidney beans   Pork   [place]   Oysters   Sunflower seeds   Brown shrimp   Sausage   Those who do not experience problems metabolizing uric acid or with kidney filtering functioning generally do not have to worry about the amount of high-purine foods they consume (up to a point, of course). However, those afflicted with extreme hyperuricemia need to adhere to a strict uric acid diet.  Foods containing amino acids (glycine, leucine, alanine, aspartic acid and glutamic acid) facilitate excretion of uric acid through the kidneys. Dairy foods and molasses, both part of a low purine diet, contain aspartic acid. Glutamic acid, produced by the body, assists in potassium transportation across the blood-brain barrier as well as to other parts of the body.  Low purine diet foods provide chemicals necessary in manufacturing glutamic acid within the body. Beans, nuts and whole wheat, all foods found in low purine foods, contain leucine, another essential amino acid contributing to decreased attacks of gouts by enhancing bone and muscle mass.  Low Purine Diet for Gout  All low-fat dairy products--although recent research discovered that including larger than normal amounts of low-fat dairy products in a diet decreases instances of gout by more than half, scientists are not certain why these foods produce this effect.  A list of other low-fat, low purine diet foods include:  Pudding   Peanut  butter   Low-fiber breads   Rice   Pasta   Low-fat cheese and ice cream   Soups containing no broth or meat extract   Sweet items in limited amounts   Fruits and vegetables   Red and white cabbage   Luncheon meat   Green olives   Sauerkraut   Tofu   [place] and hazelnuts   Figs   Be aware that breads, cereals and crackers advertising as being 100% wheat, stone-ground or multi-grain are generally not whole grain foods and may contain ingredients such as yeast, which increases uric acid levels and promotes formation of urate crystals.  Purchase only lean cuts of meat containing as little fillers or fat as possible when creating your low purine diet meal plan. In addition, poultry should be cooked and eaten without the skin, since the fattiest part of chicken or turkey is the skin. Instead of frying meat, bake, poach, broil or grill meat. If you prefer, meat substitutes are acceptable to include in a gout diet, such as those made with soybeans, egg substitutes, vegetable-protein tofu or legumes.    Importance of Cherries in a Low Purine Diet     Cherry juice and tart cherries (the [place] and [place] kind that are grown in [place]), contain potassium, an essential mineral necessary for optimal kidney functioning and regulating blood pressure. Because potassium is essential, this indicates that the body is incapable of chemically altering other substances to create potassium. Therefore, humans must then obtain potassium from other sources, such as cherries, to maintain good health.  Tart cherries and cherry juice keep urine pH at a slightly alkaline level, which inhibits accumulation of uric acid in the blood and consequential formation of uric acid crystals in joints. Cherries should definitely be a part of a low purine diet.  In addition, drinking cherry juice for gout reduces uric acid levels due to the presence of anthocyanins in the cherries. Extracted from berries and grapes, anthocyanin is an  antioxidant and bioflavonoid that contains antimicrobial, anti-inflammatory properties enhancing blood vessel and platelet functioning.   Gout sufferers should eat eight ounces or more of tart cherries each day to relieve pain and swelling of gout. Drinking the same amount of cherry juice instead of eating cherries will also significantly reduce inflammation.  Mulberries     Mulberries should also be included in a low purine diet because they contain healthy amounts of [place], a flavonoid attributed to relieving pain from inflammation. Although mulberries do not directly reduce the high levels of uric acid in a gout sufferers blood stream, they do alleviate pain as incidences of gout flare-ups are being treated.  In addition, mulberries also enhance capillary strength, which is often compromised when joints are inflamed. According to the book Vitamins and Minerals Demystified by Dr. Todd Reynolds since ascorbic acid slightly increases capillary fragility, this action of the bioflavonoids offsets this tendency. Consuming mulberries while on a low purine diet will counteract this issue precipitated by fruits being such an integral part of gout diet.  Other Flavonoid-Rich Foods  Gout sufferers who are sensitive to over the counter pain relievers such as ibuprofen or aspirin may find natural relief from flavonoid-rich, low purine diet foods such as:  Blueberries   Blackberries   Jacob and limes   Dark-colored beans   Cranberries   Green and red vegetables   Red and green onions   Effects of Coffee and Tea on Gout     Drinking coffee but not tea seems to correlate with a subsequent reduction of blood uric acid according to a report published in the June 2007 issue of Journal of Arthritis Care and Research. Researchers interviewed over 14,000 subjects regarding coffee and tea drinking habits and later evaluated their uric acid levels according to whether they were predominantly coffee or tea drinkers, or drank neither  one.  Those who drank four to five cups of coffee each day as part of a low purine diet indicated a decrease of 0.26 mg/dl serum uric acid in contrast to those who did not drink coffee at all.   Those who drank six cups of coffee per day experienced uric acid level reductions of 0.43 mg/dl.   Interestingly, researchers also investigated the effects of caffeine contained in beverages other than coffee and found drinks such as soda did not influence uric acid levels one way or the other. This may indicate that coffee contains a unique ingredient or property responsible for decreasing uric acid and alleviating gout symptoms.  Fish Oil as Part of a Low Purine Diet       A substantial amount of research has been accomplished regarding the strong anti-inflammatory properties of fish oil, or omega 3 fatty acids. By facilitating the production of prostanglandins, hormones responsible for reducing inflammation and regulating calcium dispersion, fish oil is a necessary part of a successful low purine diet intended to combat the pain of gout.  In addition, prostanglandins inhibit particular pro-inflammatory hormones that are part of the prostanglandin family. While fish oil wont relieve pain as quickly as NSAIDs (non-steroidal anti-inflammatory drugs such as aspirin or ibuprofen), they effectively work to reduce the presence of prostanglandins over longer periods of time, eliminating the need to take NSAIDs which often cause side effects.  Frequently, insulin resistance (metabolic syndrome), heart disease and kidney stones are co-morbidly occurring diseases with gout. Fish oil supplements taken in conjunction with a low purine diet can benefit these diseases as well by decreasing bad fats (triglycerides) and preventing platelet aggregation, which is a common cause of blocked vessels and heart attacks.   A few foods other than fish contain omega 3, which are also foods that can be included in a gout diet. Walnuts, flaxseeds,  flaxseed oil and eggs are all sources of omega-3 that do not contribute to uric acid levels in the body.   Folic Acid   Folic acid effectively neutralizes an enzyme called xanthine oxidase which is directly responsible for producing uric acid. By also regulating any uric acid already present in the blood, gout attacks are usually shorter and less painful. However, the recommended daily amounts of folic acid fall short of the 80 milligrams a day that is required to decrease blood uric acid. In addition, epileptics who take medication may find that folic acid interferes with the efficacy of that medication. Little research has been done regarding the effect of high doses of folic acid so including folic acid supplements in a low purine diet should be implemented under the supervision of a doctor.  When following a low purine diet for gout, remember to increase liquids to at least ten, eight ounce servings of water or juice daily. Liquids facilitate the elimination of uric acid by assisting the kidneys in the process of filtering and disposing of wastes.  Suggested Low Purine Diet Menus for Gout Diet  Diets to reduce uric acid are not as restrictive as diabetic or even weight management diets. However, if you are accustomed to eating high purine foods or drinking alcohol on a regular basis, this diet may represent a drastic change in your eating habits. However, to avoid painful attacks of gout that could eventually result in irreversible damage to joints and lifelong impairment of movement, reducing uric acid levels should be a priority in your life.  Low Purine Diet for Gout  This type of diet should include daily servings of:  Grain products--six to ten servings   Fruits--two to four servings   Vegetables--three servings   Low-fat dairy products--two servings   Protein-rich foods-one egg, one ounce of cheese, three ounces of cooked, low-purine meat and two tablespoons of peanut butter   When consuming foods included  in a uric acid diet you may eat as much as you like, as long as this does not contribute to weight gain. This poses a problem since many low purine foods such as white breads, pasta and cereals contain high levels of sugar and carbohydrates. A good meal plan to follow to reduce uric acid in the body would be:   Breakfast:  Bowl of cereal such as cornflakes or crisp rice   White bread toast, buttered with olive oil spread   Glass of skim milk   Cup of tea or coffee or small glass of cherry juice   Snack:   Low-fat cheese and saltines or grapes   Lunch:   Sliced meat sandwich (ham, chicken or turkey) on white bread or   Peanut butter sandwich on white bread   Fruit salad   Coffee, tea, water or cherry juice   Small slice of white cake or two peanut butter or sugar cookies   Dinner:   Grilled chicken breast   Pasta or rice   Carrots, cauliflower or asparagus   Water or cherry juice   Pudding made with low-fat milk   Snack:   Fruit chunks   Fresh vegetable mixture   Fresh berries   Many variations are possible with this uric acid diet. However, even though gout symptoms decrease, you need to follow a low purine diet as closely as possible to maintain successful control over uric acid levels.  How Much Fat in a Low Purine Diet?  While eating moderate to excessive amounts of saturated fat is detrimental to everyones health, it is especially damaging to gout sufferers because fat is directly correlated with weight gain, heart disease and exacerbated gout symptoms. However, small quantities of fat are necessary for optimal functioning of the nervous system as myelin, a form of fat, is essential for optimal signal transduction in the brain enhancing cognition abilities.  For this reason, gout sufferers should have one serving of one of the following per day:  2 tablespoons of half and half cream   1 tablespoon of cream cheese   1 teaspoon of butter   1 tablespoon of reduced fat mayonnaise   1 tablespoon of whole sesame  seeds   1 slice of diggs   1 tablespoon of sunflower or pumpkin seeds   1 teaspoon of trans fat-free margarine   Benefits of Dark Chocolate and Cocoa Powder  Research has discovered that chocolate and cocoa powder, especially dark chocolate, contains high amounts of flavonoids when compared to other foods rich in antioxidants. For this reason, an occasional small piece or two of dark chocolate or cup of cocoa should be included in a low purine diet. In fact, cocoa powder possesses the highest amount as a member of the chocolate family, containing ten times the amount of flavonoids found in cranberries and strawberries.  Chocolate is low in purines as well, but dont eat it excessively as it is high in empty calories and promotes weight gain unless you engage in regular exercise.   Prescription Treatments for Gout and Uric Acid Conditions  While a low purine diet for gout is one of the best defensive measures which those suffering from gout can pursue, physicians provide drugs to assist in alleviating gout symptoms. The principle medication used in treating gout is colchicine, a recently FDA approved medication naturally found in plants belong to the Colchicum family.  However, colchicine is known to produce side effects such as gastrointestinal disturbances and neutropenia, which is a reduction in a vital type of white blood cell. Doses must be closely monitored, as too high of a dose can profoundly weaken bone marrow and induce anemia. Colchicine toxicity is similar to arsenic poisoning, resulting in damage and failure of internal organs within 72 hours of ingestion.  Prognosis and Summary  A gout attack or flare-up generally diminishes within five to seven days without treatment, depending on severity of the attack and amount of uric acid in the blood. More than half of these will suffer another attack within twelve months of experiencing the first attack, unless treatment is pursued and a low purine diet is  adopted.  It is also important to note that developing gout also puts you at risk for diabetes mellitus, metabolic syndrome, hypertension, renal failure and cardiovascular disease. While some of these disorders may partly result from obesity or insulin resistance issues, the majority are a direct consequence of untreated hyperuricemia.  Destruction of joints is another consequence of elevated uric acid levels and continued accumulation of urate acid crystals within joints. Implementing gout prevention measures as quickly as possible is vital to eliminating uric acid from the blood and reducing the pain and swelling of gout.

## 2017-07-03 RX ORDER — TRIAMCINOLONE ACETONIDE 40 MG/ML
20 INJECTION, SUSPENSION INTRA-ARTICULAR; INTRAMUSCULAR ONCE
Status: COMPLETED | OUTPATIENT
Start: 2017-07-03 | End: 2017-06-27

## 2017-10-31 PROBLEM — N28.9 RENAL IMPAIRMENT: Status: ACTIVE | Noted: 2017-10-31

## 2018-02-25 PROBLEM — N18.2 CKD (CHRONIC KIDNEY DISEASE) STAGE 2, GFR 60-89 ML/MIN: Status: ACTIVE | Noted: 2018-02-25

## 2018-03-04 PROBLEM — E78.5 HYPERLIPIDEMIA: Status: ACTIVE | Noted: 2018-03-04

## 2018-03-04 PROBLEM — E11.40 DIABETIC NEUROPATHY ASSOCIATED WITH TYPE 2 DIABETES MELLITUS: Status: ACTIVE | Noted: 2018-03-04

## 2018-04-07 PROBLEM — R42 VERTIGO: Status: ACTIVE | Noted: 2018-04-07

## 2018-04-23 ENCOUNTER — OFFICE VISIT (OUTPATIENT)
Dept: OPHTHALMOLOGY | Facility: CLINIC | Age: 73
End: 2018-04-23
Payer: MEDICARE

## 2018-04-23 DIAGNOSIS — H25.013 CATARACT CORTICAL, SENILE, BILATERAL: ICD-10-CM

## 2018-04-23 DIAGNOSIS — Z13.5 GLAUCOMA SCREENING: ICD-10-CM

## 2018-04-23 DIAGNOSIS — E11.9 DIABETES MELLITUS TYPE 2 WITHOUT RETINOPATHY: Primary | ICD-10-CM

## 2018-04-23 DIAGNOSIS — H53.001 AMBLYOPIA, RIGHT: ICD-10-CM

## 2018-04-23 DIAGNOSIS — H25.13 CATARACT, NUCLEAR SCLEROTIC SENILE, BILATERAL: ICD-10-CM

## 2018-04-23 DIAGNOSIS — H52.203 HYPEROPIC ASTIGMATISM OF BOTH EYES: ICD-10-CM

## 2018-04-23 DIAGNOSIS — H52.4 PRESBYOPIA: ICD-10-CM

## 2018-04-23 PROCEDURE — 92014 COMPRE OPH EXAM EST PT 1/>: CPT | Mod: S$GLB,,, | Performed by: OPTOMETRIST

## 2018-04-23 PROCEDURE — 92015 DETERMINE REFRACTIVE STATE: CPT | Mod: S$GLB,,, | Performed by: OPTOMETRIST

## 2018-04-23 PROCEDURE — 99999 PR PBB SHADOW E&M-EST. PATIENT-LVL I: CPT | Mod: PBBFAC,,, | Performed by: OPTOMETRIST

## 2018-04-23 NOTE — PROGRESS NOTES
HPI     Last [facility] exam 04/20/2017  Diabetic/NIDDM  Amblyopia, OD  Cataract cortical, senile, bilateral  Cataract, nuclear, bilateral  Screening for glaucoma  RE  No visual complaints      Last edited by Sebastian Rodrigues MA on 4/23/2018  9:48 AM. (History)            Assessment /Plan     For exam results, see Encounter Report.    Diabetes mellitus type 2 without retinopathy    Cataract, nuclear sclerotic senile, bilateral    Cataract cortical, senile, bilateral    Amblyopia, right    Glaucoma screening    Hyperopic astigmatism of both eyes    Presbyopia      No diabetic retinopathy OU.  Moderate NS/cortical cataracts OU without complaint = discussed and will follow.  Amblyope = OD.  OH OK OU otherwise.  Spec Rx given with balance OD.  RTC one year.

## 2018-07-19 PROBLEM — I25.10 CORONARY ARTERY DISEASE INVOLVING NATIVE CORONARY ARTERY OF NATIVE HEART WITHOUT ANGINA PECTORIS: Status: ACTIVE | Noted: 2018-03-22

## 2018-07-19 PROBLEM — E78.5 DYSLIPIDEMIA: Status: ACTIVE | Noted: 2018-03-22

## 2018-07-19 PROBLEM — J01.00 ACUTE NON-RECURRENT MAXILLARY SINUSITIS: Status: ACTIVE | Noted: 2018-07-19

## 2018-07-19 PROBLEM — E11.9 TYPE 2 DIABETES MELLITUS WITHOUT COMPLICATION, WITHOUT LONG-TERM CURRENT USE OF INSULIN: Status: ACTIVE | Noted: 2018-03-22

## 2018-07-19 PROBLEM — I10 ESSENTIAL HYPERTENSION: Status: ACTIVE | Noted: 2018-03-22

## 2018-10-22 PROBLEM — Z00.00 MEDICARE ANNUAL WELLNESS VISIT, SUBSEQUENT: Status: ACTIVE | Noted: 2018-10-22

## 2018-10-22 PROBLEM — J01.00 ACUTE NON-RECURRENT MAXILLARY SINUSITIS: Status: RESOLVED | Noted: 2018-07-19 | Resolved: 2018-10-22

## 2018-10-22 PROBLEM — E55.9 VITAMIN D DEFICIENCY: Status: ACTIVE | Noted: 2018-10-22

## 2018-10-22 PROBLEM — Z12.2 ENCOUNTER FOR SCREENING FOR LUNG CANCER: Status: ACTIVE | Noted: 2018-10-22

## 2018-10-30 PROBLEM — N18.30 CKD (CHRONIC KIDNEY DISEASE) STAGE 3, GFR 30-59 ML/MIN: Status: ACTIVE | Noted: 2018-10-30

## 2019-01-21 PROBLEM — Z00.00 MEDICARE ANNUAL WELLNESS VISIT, SUBSEQUENT: Status: RESOLVED | Noted: 2018-10-22 | Resolved: 2019-01-21

## 2019-05-15 PROBLEM — E03.9 HYPOTHYROIDISM: Status: ACTIVE | Noted: 2019-05-15

## 2019-06-13 PROBLEM — M10.9 GOUT: Status: ACTIVE | Noted: 2019-06-13

## 2019-06-13 PROBLEM — M79.644 THUMB PAIN, RIGHT: Status: ACTIVE | Noted: 2019-06-13

## 2019-09-12 PROBLEM — M54.2 CERVICALGIA: Status: ACTIVE | Noted: 2019-09-12

## 2019-10-23 PROBLEM — Z00.00 ANNUAL PHYSICAL EXAM: Status: ACTIVE | Noted: 2018-10-22

## 2019-10-23 PROBLEM — I12.9 BENIGN HYPERTENSIVE RENAL DISEASE: Status: ACTIVE | Noted: 2019-09-09

## 2019-10-23 PROBLEM — N95.1 MENOPAUSAL SYNDROME: Status: ACTIVE | Noted: 2019-10-23

## 2019-10-23 PROBLEM — D50.9 IRON DEFICIENCY ANEMIA: Status: ACTIVE | Noted: 2019-09-09

## 2019-10-23 PROBLEM — M19.90 OSTEOARTHRITIS: Status: ACTIVE | Noted: 2019-10-09

## 2020-01-27 PROBLEM — Z00.00 ANNUAL PHYSICAL EXAM: Status: RESOLVED | Noted: 2018-10-22 | Resolved: 2020-01-27

## 2020-03-02 PROBLEM — R80.9 PROTEINURIA: Status: ACTIVE | Noted: 2020-03-02

## 2020-11-23 PROBLEM — N18.2 CKD (CHRONIC KIDNEY DISEASE) STAGE 2, GFR 60-89 ML/MIN: Status: RESOLVED | Noted: 2018-02-25 | Resolved: 2020-11-23

## 2020-11-23 PROBLEM — N18.30 CKD (CHRONIC KIDNEY DISEASE) STAGE 3, GFR 30-59 ML/MIN: Status: RESOLVED | Noted: 2018-10-30 | Resolved: 2020-11-23

## 2020-11-23 PROBLEM — N18.31 CHRONIC KIDNEY DISEASE, STAGE 3A: Status: ACTIVE | Noted: 2018-10-30

## 2020-12-07 ENCOUNTER — OFFICE VISIT (OUTPATIENT)
Dept: PODIATRY | Facility: CLINIC | Age: 75
End: 2020-12-07
Payer: MEDICARE

## 2020-12-07 VITALS
WEIGHT: 183.44 LBS | BODY MASS INDEX: 31.32 KG/M2 | HEART RATE: 88 BPM | SYSTOLIC BLOOD PRESSURE: 128 MMHG | DIASTOLIC BLOOD PRESSURE: 78 MMHG | HEIGHT: 64 IN

## 2020-12-07 DIAGNOSIS — E11.49 OTHER DIABETIC NEUROLOGICAL COMPLICATION ASSOCIATED WITH TYPE 2 DIABETES MELLITUS: Primary | ICD-10-CM

## 2020-12-07 DIAGNOSIS — M79.671 INFLAMMATORY HEEL PAIN, RIGHT: ICD-10-CM

## 2020-12-07 PROCEDURE — 99204 OFFICE O/P NEW MOD 45 MIN: CPT | Mod: 25,S$GLB,, | Performed by: PODIATRIST

## 2020-12-07 PROCEDURE — 3078F PR MOST RECENT DIASTOLIC BLOOD PRESSURE < 80 MM HG: ICD-10-PCS | Mod: CPTII,S$GLB,, | Performed by: PODIATRIST

## 2020-12-07 PROCEDURE — 1159F MED LIST DOCD IN RCRD: CPT | Mod: S$GLB,,, | Performed by: PODIATRIST

## 2020-12-07 PROCEDURE — 99204 PR OFFICE/OUTPT VISIT, NEW, LEVL IV, 45-59 MIN: ICD-10-PCS | Mod: 25,S$GLB,, | Performed by: PODIATRIST

## 2020-12-07 PROCEDURE — 3008F BODY MASS INDEX DOCD: CPT | Mod: CPTII,S$GLB,, | Performed by: PODIATRIST

## 2020-12-07 PROCEDURE — 3288F FALL RISK ASSESSMENT DOCD: CPT | Mod: CPTII,S$GLB,, | Performed by: PODIATRIST

## 2020-12-07 PROCEDURE — 99999 PR PBB SHADOW E&M-EST. PATIENT-LVL IV: ICD-10-PCS | Mod: PBBFAC,,, | Performed by: PODIATRIST

## 2020-12-07 PROCEDURE — 3078F DIAST BP <80 MM HG: CPT | Mod: CPTII,S$GLB,, | Performed by: PODIATRIST

## 2020-12-07 PROCEDURE — 99999 PR PBB SHADOW E&M-EST. PATIENT-LVL IV: CPT | Mod: PBBFAC,,, | Performed by: PODIATRIST

## 2020-12-07 PROCEDURE — 3044F HG A1C LEVEL LT 7.0%: CPT | Mod: CPTII,S$GLB,, | Performed by: PODIATRIST

## 2020-12-07 PROCEDURE — 3288F PR FALLS RISK ASSESSMENT DOCUMENTED: ICD-10-PCS | Mod: CPTII,S$GLB,, | Performed by: PODIATRIST

## 2020-12-07 PROCEDURE — 1101F PT FALLS ASSESS-DOCD LE1/YR: CPT | Mod: CPTII,S$GLB,, | Performed by: PODIATRIST

## 2020-12-07 PROCEDURE — 1101F PR PT FALLS ASSESS DOC 0-1 FALLS W/OUT INJ PAST YR: ICD-10-PCS | Mod: CPTII,S$GLB,, | Performed by: PODIATRIST

## 2020-12-07 PROCEDURE — 3008F PR BODY MASS INDEX (BMI) DOCUMENTED: ICD-10-PCS | Mod: CPTII,S$GLB,, | Performed by: PODIATRIST

## 2020-12-07 PROCEDURE — 3044F PR MOST RECENT HEMOGLOBIN A1C LEVEL <7.0%: ICD-10-PCS | Mod: CPTII,S$GLB,, | Performed by: PODIATRIST

## 2020-12-07 PROCEDURE — 3074F SYST BP LT 130 MM HG: CPT | Mod: CPTII,S$GLB,, | Performed by: PODIATRIST

## 2020-12-07 PROCEDURE — 3074F PR MOST RECENT SYSTOLIC BLOOD PRESSURE < 130 MM HG: ICD-10-PCS | Mod: CPTII,S$GLB,, | Performed by: PODIATRIST

## 2020-12-07 PROCEDURE — 1159F PR MEDICATION LIST DOCUMENTED IN MEDICAL RECORD: ICD-10-PCS | Mod: S$GLB,,, | Performed by: PODIATRIST

## 2020-12-07 NOTE — PROGRESS NOTES
Subjective:     Patient ID: Tamy Allan is a 75 y.o. female.    Chief Complaint: Diabetic Foot Exam (Diabbetic foot exam and neuropathy, diabetic pt wears tennis shoes w/ socks PCP Dr. Ro last seen on 7/16/20)    Tamy is a 75 y.o. female who presents to the clinic for evaluation and treatment of high risk feet. Tamy has a past medical history of Acquired hypothyroidism, Amblyopia, Arthritis, Benign hypertension, Cardiomegaly, Cataract, Diabetes mellitus, type 2, Encounter for blood transfusion, Encounter for general adult medical examination without abnormal findings (08/31/2016), Hyperlipidemia, Hyperlipidemia, Hypertension, Refractive error, Type 2 diabetes mellitus, and Vitamin D deficiency. The patient's chief complaint is neuropathy pain at the tip of both 2nd toes and right heel.  This patient has documented high risk feet requiring routine maintenance secondary to diabetes mellitis and those secondary complications of diabetes, as mentioned..    PCP: Irma Ro MD    Date Last Seen by PCP: 07/16/2020    Current shoe gear:  Affected Foot: Tennis shoes     Unaffected Foot: Tennis shoes    Hemoglobin A1C   Date Value Ref Range Status   02/15/2018 5.5 4.8 - 5.6 % Final     Comment:              Pre-diabetes: 5.7 - 6.4           Diabetes: >6.4           Glycemic control for adults with diabetes: <7.0     10/18/2017 5.4 4.8 - 5.6 % Final     Comment:              Pre-diabetes: 5.7 - 6.4           Diabetes: >6.4           Glycemic control for adults with diabetes: <7.0         Patient Active Problem List   Diagnosis    Diabetes mellitus    HAV (hallux abducto valgus)    Arthritis, midfoot - Right Foot    Renal impairment    Hyperlipidemia    Diabetic neuropathy associated with type 2 diabetes mellitus    Vertigo    Coronary artery disease involving native coronary artery of native heart without angina pectoris    Dyslipidemia    Essential hypertension    Type 2 diabetes mellitus  without complication, without long-term current use of insulin    Vitamin D deficiency    Hypothyroidism    Gout    Thumb pain, right    Cervicalgia    Benign hypertensive renal disease    Iron deficiency anemia    Osteoarthritis    Menopausal syndrome    Proteinuria    Chronic kidney disease, stage 3a       Medication List with Changes/Refills   Current Medications    AMLODIPINE (NORVASC) 5 MG TABLET    TAKE 1 TABLET EVERY DAY    ASPIRIN 81 MG CHEW    Take 81 mg by mouth once daily.    ATORVASTATIN (LIPITOR) 40 MG TABLET    Take 1 tablet (40 mg total) by mouth once daily.    CALCIUM CARB/VIT D3/MINERALS (CALTRATE PLUS ORAL)    Take by mouth.    CARVEDILOL (COREG) 25 MG TABLET    Take 1 tablet (25 mg total) by mouth 2 (two) times a day.    CETIRIZINE HCL (ZYRTEC ORAL)    Take by mouth.    CYANOCOBALAMIN, VITAMIN B-12, (VITAMIN B-12 ORAL)    Take by mouth once daily.     CYCLOBENZAPRINE (FLEXERIL) 10 MG TABLET    Take 1 tablet (10 mg total) by mouth 3 (three) times daily as needed for Muscle spasms.    FLUTICASONE PROPIONATE (FLONASE) 50 MCG/ACTUATION NASAL SPRAY    USE 2 SPRAYS IN EACH NOSTRIL AT BEDTIME (SUBSTITUTED FOR  FLONASE)    HYDRALAZINE (APRESOLINE) 10 MG TABLET    Take 1 tablet (10 mg total) by mouth 3 (three) times daily.    LEVOTHYROXINE (SYNTHROID) 88 MCG TABLET    TAKE 1 TABLET EVERY DAY    LINAGLIPTIN (TRADJENTA) 5 MG TAB TABLET    TAKE 1 TABLET(5 MG) BY MOUTH EVERY DAY    MECLIZINE (ANTIVERT) 25 MG TABLET    Take 1 tablet (25 mg total) by mouth 3 (three) times daily as needed.    MONTELUKAST (SINGULAIR) 10 MG TABLET    TAKE 1 TABLET EVERY EVENING    MULTIVITAMIN W-MINERALS/LUTEIN (CENTRUM SILVER ORAL)    Take by mouth.    PREDNISONE (DELTASONE) 20 MG TABLET    TK 1 T PO QD PRN    QUINAPRIL (ACCUPRIL) 40 MG TABLET    Take 1 tablet (40 mg total) by mouth 2 (two) times daily.    VITAMIN D (VITAMIN D3) 1000 UNITS TAB    Take 2,000 Units by mouth.       Review of patient's allergies indicates:  "  Allergen Reactions    Iodine and iodide containing products        Past Surgical History:   Procedure Laterality Date    CARDIAC CATHETERIZATION      with stents    HYSTERECTOMY         Family History   Problem Relation Age of Onset    Heart disease Mother     Hypertension Mother     Heart disease Father     Hypertension Father     Heart disease Sister     Hypertension Sister     Heart disease Brother     Hypertension Brother        Social History     Socioeconomic History    Marital status:      Spouse name: Not on file    Number of children: Not on file    Years of education: Not on file    Highest education level: Not on file   Occupational History    Not on file   Social Needs    Financial resource strain: Not on file    Food insecurity     Worry: Not on file     Inability: Not on file    Transportation needs     Medical: Not on file     Non-medical: Not on file   Tobacco Use    Smoking status: Never Smoker    Smokeless tobacco: Never Used   Substance and Sexual Activity    Alcohol use: No    Drug use: No    Sexual activity: Not on file   Lifestyle    Physical activity     Days per week: Not on file     Minutes per session: Not on file    Stress: Not on file   Relationships    Social connections     Talks on phone: Not on file     Gets together: Not on file     Attends Latter day service: Not on file     Active member of club or organization: Not on file     Attends meetings of clubs or organizations: Not on file     Relationship status: Not on file   Other Topics Concern    Not on file   Social History Narrative    Not on file       Vitals:    12/07/20 1100   BP: 128/78   Pulse: 88   Weight: 83.2 kg (183 lb 6.8 oz)   Height: 5' 4" (1.626 m)       Hemoglobin A1C   Date Value Ref Range Status   02/15/2018 5.5 4.8 - 5.6 % Final     Comment:              Pre-diabetes: 5.7 - 6.4           Diabetes: >6.4           Glycemic control for adults with diabetes: <7.0     10/18/2017 5.4 " 4.8 - 5.6 % Final     Comment:              Pre-diabetes: 5.7 - 6.4           Diabetes: >6.4           Glycemic control for adults with diabetes: <7.0         Review of Systems   Constitutional: Negative for chills and fever.   Respiratory: Negative for shortness of breath.    Cardiovascular: Negative for chest pain, palpitations, orthopnea, claudication and leg swelling.   Gastrointestinal: Negative for diarrhea, nausea and vomiting.   Musculoskeletal: Negative for joint pain.   Skin: Negative for rash.   Neurological: Positive for tingling and sensory change.   Psychiatric/Behavioral: Negative.            Objective:      PHYSICAL EXAM: Apperance: Alert and orient in no distress,well developed, and with good attention to grooming and body habits  Patient presents ambulating in tennis shoes.  LOWER EXTREMITY EXAM:  VASCULAR: Dorsalis pedis pulses 2/4 bilateral and Posterior Tibial pulses 2/4 bilateral. Capillary fill time <4 seconds bilateral. No edema observed bilateral. Varicosities absent bilateral. Skin temperature of the lower extremities is warm to warm, proximal to distal. Hair growth dim bilateral.  DERMATOLOGICAL: No skin rashes, subcutaneous nodules, lesions, or ulcers observed bilateral. Nails 1,2,3,4,5 bilateral normal length and thickness. Webspaces 1,2,3,4 bilateral clean, dry and without evidence of break in skin integrity.   NEUROLOGICAL: Light touch, sharp-dull, proprioception all present and equal bilaterally.  Vibratory sensation intact at bilateral hallux. Protective sensation intact at all 10 sites as tested with a [place]-Toby 5.07 monofilament.   MUSCULOSKELETAL: Muscle strength is 5/5 for foot inverters, everters, plantarflexors, and dorsiflexors. Muscle tone is normal. Negative pain to palpation right plantar medial tubercle. Negative pain on medial-lateral compression of the calcaneus.          Assessment:       Encounter Diagnoses   Name Primary?    Other diabetic neurological  complication associated with type 2 diabetes mellitus Yes    Inflammatory heel pain, right          Plan:   Other diabetic neurological complication associated with type 2 diabetes mellitus  -     Ambulatory referral/consult to Podiatry    Inflammatory heel pain, right      I counseled the patient on her conditions, regarding findings of my examination, my impressions, and usual treatment plan.   Greater than 50% of this visit spent on counseling and coordination of care.  Greater than 15 minutes of a 20 minute appointment spent on education about the diabetic foot, neuropathy, and prevention of limb loss.  Shoe inspection. Diabetic Foot Education. Patient reminded of the importance of good nutrition and blood sugar control to help prevent podiatric complications of diabetes. Patient instructed on proper foot hygeine. We discussed wearing proper shoe gear, daily foot inspections, never walking without protective shoe gear, never putting sharp instruments to feet.    Discussed with patient treatments for neuropathy consisting of topical or oral medication.  Recommendations given for over-the-counter medicine such as Two [place] Goats and/or Capsaicin.  I explained to the patient that etiology and treatment options for heel pain including rest,  ice messages, stretching exercises, strappings/tappings, NSAID's, injections, new shoegear with orthotic inserts, and/or surgical treatment.   Patient agreed to stretching exercises.   I gave written and verbal instructions on heel cord stretching and this was demonstrated for the patient. Patient expressed understanding.  Patient instructed on adequate icing techniques. Patient should ice the affected area at least once per day x 10 minutes for 10 days . I advised the  patient that extra icing would also be beneficial to ensure adequate anti inflammatory effect.   The patient and I reviewed the types of shoes she should be wearing, my recommendation includes generally the best  time of the day for a shoe fitting is the afternoon, shoes with a wide toe box, very good cushion, and tennis shoes with removable inner soles. The patient and I reviewed my recommendations for over-the-counter orthotic inserts.   Patient  will continue to monitor the areas daily, inspect feet, wear protective shoe gear when ambulatory, moisturizer to maintain skin integrity. Patient reminded of the importance of good nutrition and blood sugar control to help prevent podiatric complications of diabetes.  Patient to return 12 months or sooner if needed.                 Joni Ritchie DPM  [facility]

## 2020-12-07 NOTE — LETTER
December 7, 2020      Irma Ro MD  [address]  [place] [address]           [facility]  [address] [facility]  [place] [address]  Phone: [phone]  Fax: [phone]          Patient: Tamy Allan   MR Number: [id number]   YOB: 1945   Date of Visit: 12/7/2020       Dear Dr. Irma Ro:    Thank you for referring Tamy Allan to me for evaluation. Attached you will find relevant portions of my assessment and plan of care.    If you have questions, please do not hesitate to call me. I look forward to following Tamy Allan along with you.    Sincerely,    Joni Ritchie DPM    Enclosure  CC:  No Recipients    If you would like to receive this communication electronically, please contact [email or web address] or [phone] to request more information on [facility] access.    For providers and/or their staff who would like to refer a patient to [facility], please contact us through our one-stop-shop provider referral line, [facility], at [phone].    If you feel you have received this communication in error or would no longer like to receive these types of communications, please e-mail [email or web address]

## 2021-01-15 ENCOUNTER — IMMUNIZATION (OUTPATIENT)
Dept: INTERNAL MEDICINE | Facility: CLINIC | Age: 76
End: 2021-01-15
Payer: MEDICARE

## 2021-01-15 DIAGNOSIS — Z23 NEED FOR VACCINATION: Primary | ICD-10-CM

## 2021-01-15 PROCEDURE — 91300 COVID-19, MRNA, LNP-S, PF, 30 MCG/0.3 ML DOSE VACCINE: CPT | Mod: PBBFAC | Performed by: FAMILY MEDICINE

## 2021-02-05 ENCOUNTER — IMMUNIZATION (OUTPATIENT)
Dept: INTERNAL MEDICINE | Facility: CLINIC | Age: 76
End: 2021-02-05
Payer: MEDICARE

## 2021-02-05 DIAGNOSIS — Z23 NEED FOR VACCINATION: Primary | ICD-10-CM

## 2021-02-05 PROCEDURE — 0002A COVID-19, MRNA, LNP-S, PF, 30 MCG/0.3 ML DOSE VACCINE: CPT | Mod: PBBFAC | Performed by: FAMILY MEDICINE

## 2021-02-05 PROCEDURE — 91300 COVID-19, MRNA, LNP-S, PF, 30 MCG/0.3 ML DOSE VACCINE: CPT | Mod: PBBFAC | Performed by: FAMILY MEDICINE

## 2021-10-02 ENCOUNTER — IMMUNIZATION (OUTPATIENT)
Dept: PRIMARY CARE CLINIC | Facility: CLINIC | Age: 76
End: 2021-10-02
Payer: MEDICARE

## 2021-10-02 DIAGNOSIS — Z23 NEED FOR VACCINATION: Primary | ICD-10-CM

## 2021-10-02 PROCEDURE — 91300 COVID-19, MRNA, LNP-S, PF, 30 MCG/0.3 ML DOSE VACCINE: CPT | Mod: PBBFAC | Performed by: FAMILY MEDICINE

## 2021-10-02 PROCEDURE — 0003A COVID-19, MRNA, LNP-S, PF, 30 MCG/0.3 ML DOSE VACCINE: CPT | Mod: CV19,PBBFAC | Performed by: FAMILY MEDICINE

## 2022-01-16 PROBLEM — M85.852 OSTEOPENIA OF NECKS OF BOTH FEMURS: Status: ACTIVE | Noted: 2022-01-16

## 2022-01-16 PROBLEM — N18.9 ANEMIA IN CHRONIC KIDNEY DISEASE: Status: ACTIVE | Noted: 2021-11-29

## 2022-01-16 PROBLEM — M85.851 OSTEOPENIA OF NECKS OF BOTH FEMURS: Status: ACTIVE | Noted: 2022-01-16

## 2022-01-16 PROBLEM — M47.812 CERVICAL SPONDYLOSIS: Status: ACTIVE | Noted: 2021-03-30

## 2022-01-16 PROBLEM — D63.1 ANEMIA IN CHRONIC KIDNEY DISEASE: Status: ACTIVE | Noted: 2021-11-29

## 2022-07-25 ENCOUNTER — IMMUNIZATION (OUTPATIENT)
Dept: PRIMARY CARE CLINIC | Facility: CLINIC | Age: 77
End: 2022-07-25
Payer: MEDICARE

## 2022-07-25 DIAGNOSIS — Z23 NEED FOR VACCINATION: Primary | ICD-10-CM

## 2022-07-25 PROCEDURE — 91305 COVID-19, MRNA, LNP-S, PF, 30 MCG/0.3 ML DOSE VACCINE (PFIZER): CPT | Mod: PBBFAC | Performed by: FAMILY MEDICINE

## 2022-10-27 PROBLEM — Z90.710 STATUS POST HYSTERECTOMY: Status: ACTIVE | Noted: 2022-10-27

## 2023-01-01 ENCOUNTER — HOSPITAL ENCOUNTER (OUTPATIENT)
Dept: RADIOLOGY | Facility: HOSPITAL | Age: 78
Discharge: HOME | End: 2023-09-20
Attending: INTERNAL MEDICINE
Payer: MEDICARE

## 2023-01-01 DIAGNOSIS — M79.662 PAIN IN LEFT LOWER LEG: ICD-10-CM

## 2023-01-01 DIAGNOSIS — M25.469 JOINT SWELLING OF LOWER LEG: ICD-10-CM

## 2023-01-01 PROCEDURE — 93971 EXTREMITY STUDY: CPT | Mod: 26,LT,, | Performed by: RADIOLOGY

## 2023-01-01 PROCEDURE — 93971 EXTREMITY STUDY: CPT | Mod: TC,LT

## 2023-01-01 PROCEDURE — 93971 US LOWER EXTREMITY VEINS LEFT: ICD-10-PCS | Mod: 26,LT,, | Performed by: RADIOLOGY

## 2023-01-19 ENCOUNTER — IMMUNIZATION (OUTPATIENT)
Dept: PRIMARY CARE CLINIC | Facility: CLINIC | Age: 78
End: 2023-01-19
Payer: MEDICARE

## 2023-01-19 DIAGNOSIS — Z23 NEED FOR VACCINATION: Primary | ICD-10-CM

## 2023-01-19 PROCEDURE — 0124A COVID-19, MRNA, LNP-S, BIVALENT BOOSTER, PF, 30 MCG/0.3 ML DOSE: CPT | Mod: CV19,PBBFAC | Performed by: FAMILY MEDICINE

## 2023-01-19 PROCEDURE — 91312 COVID-19, MRNA, LNP-S, BIVALENT BOOSTER, PF, 30 MCG/0.3 ML DOSE: CPT | Mod: PBBFAC | Performed by: FAMILY MEDICINE

## 2023-05-30 PROCEDURE — 88305 TISSUE EXAM BY PATHOLOGIST: CPT | Performed by: CLINICAL MEDICAL LABORATORY

## 2023-05-31 ENCOUNTER — LAB REQUISITION (OUTPATIENT)
Dept: LAB | Age: 78
End: 2023-05-31

## 2023-05-31 DIAGNOSIS — M27.0 DEVELOPMENTAL DISORDERS OF JAWS: ICD-10-CM

## 2023-06-05 LAB
ASR DISCLAIMER: NORMAL
CASE RPRT: NORMAL
CLINICAL INFO: NORMAL
PATH REPORT.FINAL DX SPEC: NORMAL
PATH REPORT.GROSS SPEC: NORMAL
PATH REPORT.MICROSCOPIC SPEC OTHER STN: NORMAL

## 2023-07-10 PROBLEM — E11.51 TYPE 2 DIABETES MELLITUS WITH DIABETIC PERIPHERAL ANGIOPATHY WITHOUT GANGRENE, WITHOUT LONG-TERM CURRENT USE OF INSULIN: Status: ACTIVE | Noted: 2023-01-01

## 2023-07-10 PROBLEM — I42.0 DILATED CARDIOMYOPATHY: Status: ACTIVE | Noted: 2023-01-01

## 2023-07-10 PROBLEM — J44.9 CHRONIC OBSTRUCTIVE PULMONARY DISEASE, UNSPECIFIED COPD TYPE: Status: ACTIVE | Noted: 2023-01-01

## 2024-01-01 ENCOUNTER — OFFICE VISIT (OUTPATIENT)
Dept: HEMATOLOGY/ONCOLOGY | Facility: CLINIC | Age: 79
End: 2024-01-01
Payer: MEDICARE

## 2024-01-01 ENCOUNTER — HOSPITAL ENCOUNTER (INPATIENT)
Facility: HOSPITAL | Age: 79
LOS: 6 days | Discharge: HOME HEALTH | DRG: 436 | End: 2024-01-28
Attending: FAMILY MEDICINE | Admitting: HOSPITALIST
Payer: MEDICARE

## 2024-01-01 ENCOUNTER — OFFICE VISIT (OUTPATIENT)
Dept: HOME HEALTH SERVICES | Facility: CLINIC | Age: 79
End: 2024-01-01
Payer: MEDICARE

## 2024-01-01 ENCOUNTER — DOCUMENTATION ONLY (OUTPATIENT)
Dept: HEMATOLOGY/ONCOLOGY | Facility: CLINIC | Age: 79
End: 2024-01-01

## 2024-01-01 ENCOUNTER — TELEPHONE (OUTPATIENT)
Dept: HEMATOLOGY/ONCOLOGY | Facility: CLINIC | Age: 79
End: 2024-01-01
Payer: MEDICARE

## 2024-01-01 ENCOUNTER — NURSE TRIAGE (OUTPATIENT)
Dept: ADMINISTRATIVE | Facility: CLINIC | Age: 79
End: 2024-01-01
Payer: MEDICARE

## 2024-01-01 ENCOUNTER — PATIENT OUTREACH (OUTPATIENT)
Dept: ADMINISTRATIVE | Facility: CLINIC | Age: 79
End: 2024-01-01
Payer: MEDICARE

## 2024-01-01 VITALS
BODY MASS INDEX: 30.62 KG/M2 | DIASTOLIC BLOOD PRESSURE: 51 MMHG | OXYGEN SATURATION: 92 % | RESPIRATION RATE: 16 BRPM | HEART RATE: 90 BPM | SYSTOLIC BLOOD PRESSURE: 84 MMHG | TEMPERATURE: 98 F | WEIGHT: 162.06 LBS

## 2024-01-01 VITALS
SYSTOLIC BLOOD PRESSURE: 132 MMHG | OXYGEN SATURATION: 94 % | HEART RATE: 110 BPM | TEMPERATURE: 98 F | RESPIRATION RATE: 22 BRPM | DIASTOLIC BLOOD PRESSURE: 76 MMHG

## 2024-01-01 VITALS
DIASTOLIC BLOOD PRESSURE: 63 MMHG | TEMPERATURE: 98 F | WEIGHT: 162.06 LBS | RESPIRATION RATE: 16 BRPM | HEIGHT: 61 IN | HEART RATE: 95 BPM | BODY MASS INDEX: 30.6 KG/M2 | OXYGEN SATURATION: 92 % | SYSTOLIC BLOOD PRESSURE: 135 MMHG

## 2024-01-01 DIAGNOSIS — C78.01 MALIGNANT NEOPLASM METASTATIC TO BOTH LUNGS: ICD-10-CM

## 2024-01-01 DIAGNOSIS — J44.9 CHRONIC OBSTRUCTIVE PULMONARY DISEASE, UNSPECIFIED COPD TYPE: Primary | ICD-10-CM

## 2024-01-01 DIAGNOSIS — C22.9 ADENOCARCINOMA OF LIVER: Primary | ICD-10-CM

## 2024-01-01 DIAGNOSIS — F43.23 ADJUSTMENT DISORDER WITH MIXED ANXIETY AND DEPRESSED MOOD: ICD-10-CM

## 2024-01-01 DIAGNOSIS — R07.9 CHEST PAIN: ICD-10-CM

## 2024-01-01 DIAGNOSIS — C78.02 MALIGNANT NEOPLASM METASTATIC TO BOTH LUNGS: ICD-10-CM

## 2024-01-01 DIAGNOSIS — C79.9 METASTATIC DISEASE: Primary | ICD-10-CM

## 2024-01-01 DIAGNOSIS — R64 CACHEXIA: ICD-10-CM

## 2024-01-01 DIAGNOSIS — E43 SEVERE PROTEIN-CALORIE MALNUTRITION: ICD-10-CM

## 2024-01-01 DIAGNOSIS — C22.1 CHOLANGIOCARCINOMA: ICD-10-CM

## 2024-01-01 DIAGNOSIS — R06.02 SHORTNESS OF BREATH: ICD-10-CM

## 2024-01-01 DIAGNOSIS — J44.9 CHRONIC OBSTRUCTIVE PULMONARY DISEASE, UNSPECIFIED COPD TYPE: ICD-10-CM

## 2024-01-01 DIAGNOSIS — C78.7 SECONDARY LIVER CANCER: ICD-10-CM

## 2024-01-01 DIAGNOSIS — C79.9 METASTATIC DISEASE: ICD-10-CM

## 2024-01-01 LAB
AFP SERPL-MCNC: 11 NG/ML (ref 0–8.4)
ALBUMIN SERPL BCP-MCNC: 1.8 G/DL (ref 3.5–5.2)
ALBUMIN SERPL BCP-MCNC: 2 G/DL (ref 3.5–5.2)
ALP SERPL-CCNC: 318 U/L (ref 55–135)
ALP SERPL-CCNC: 347 U/L (ref 55–135)
ALP SERPL-CCNC: 401 U/L (ref 55–135)
ALP SERPL-CCNC: 414 U/L (ref 55–135)
ALT SERPL W/O P-5'-P-CCNC: 13 U/L (ref 10–44)
ALT SERPL W/O P-5'-P-CCNC: 13 U/L (ref 10–44)
ALT SERPL W/O P-5'-P-CCNC: 14 U/L (ref 10–44)
ALT SERPL W/O P-5'-P-CCNC: 15 U/L (ref 10–44)
AMMONIA PLAS-SCNC: 45 UMOL/L (ref 10–50)
ANION GAP SERPL CALC-SCNC: 11 MMOL/L (ref 8–16)
ANION GAP SERPL CALC-SCNC: 4 MMOL/L (ref 8–16)
ANION GAP SERPL CALC-SCNC: 5 MMOL/L (ref 8–16)
ANION GAP SERPL CALC-SCNC: 6 MMOL/L (ref 8–16)
ANION GAP SERPL CALC-SCNC: 8 MMOL/L (ref 8–16)
ANION GAP SERPL CALC-SCNC: 9 MMOL/L (ref 8–16)
ANISOCYTOSIS BLD QL SMEAR: SLIGHT
ANISOCYTOSIS BLD QL SMEAR: SLIGHT
AORTIC ROOT ANNULUS: 3.62 CM
ASCENDING AORTA: 3.49 CM
AST SERPL-CCNC: 42 U/L (ref 10–40)
AST SERPL-CCNC: 45 U/L (ref 10–40)
AST SERPL-CCNC: 48 U/L (ref 10–40)
AST SERPL-CCNC: 52 U/L (ref 10–40)
AV INDEX (PROSTH): 0.77
AV MEAN GRADIENT: 6 MMHG
AV PEAK GRADIENT: 10 MMHG
AV VALVE AREA BY VELOCITY RATIO: 2.59 CM²
AV VALVE AREA: 3.15 CM²
AV VELOCITY RATIO: 0.63
BACTERIA #/AREA URNS HPF: NORMAL /HPF
BACTERIA BLD CULT: NORMAL
BACTERIA BLD CULT: NORMAL
BACTERIA STL CULT: ABNORMAL
BASOPHILS # BLD AUTO: 0.06 K/UL (ref 0–0.2)
BASOPHILS # BLD AUTO: 0.07 K/UL (ref 0–0.2)
BASOPHILS # BLD AUTO: 0.08 K/UL (ref 0–0.2)
BASOPHILS # BLD AUTO: 0.21 K/UL (ref 0–0.2)
BASOPHILS NFR BLD: 0.3 % (ref 0–1.9)
BASOPHILS NFR BLD: 0.4 % (ref 0–1.9)
BASOPHILS NFR BLD: 0.8 % (ref 0–1.9)
BILIRUB SERPL-MCNC: 0.3 MG/DL (ref 0.1–1)
BILIRUB SERPL-MCNC: 0.4 MG/DL (ref 0.1–1)
BILIRUB SERPL-MCNC: 0.5 MG/DL (ref 0.1–1)
BILIRUB SERPL-MCNC: 0.6 MG/DL (ref 0.1–1)
BILIRUB UR QL STRIP: NEGATIVE
BNP SERPL-MCNC: 201 PG/ML (ref 0–99)
BSA FOR ECHO PROCEDURE: 1.77 M2
BUN SERPL-MCNC: 17 MG/DL (ref 8–23)
BUN SERPL-MCNC: 19 MG/DL (ref 8–23)
BUN SERPL-MCNC: 31 MG/DL (ref 8–23)
BUN SERPL-MCNC: 32 MG/DL (ref 8–23)
BUN SERPL-MCNC: 33 MG/DL (ref 8–23)
BUN SERPL-MCNC: 35 MG/DL (ref 8–23)
CALCIUM SERPL-MCNC: 7.8 MG/DL (ref 8.7–10.5)
CALCIUM SERPL-MCNC: 7.8 MG/DL (ref 8.7–10.5)
CALCIUM SERPL-MCNC: 8 MG/DL (ref 8.7–10.5)
CALCIUM SERPL-MCNC: 8 MG/DL (ref 8.7–10.5)
CALCIUM SERPL-MCNC: 8.3 MG/DL (ref 8.7–10.5)
CALCIUM SERPL-MCNC: 8.8 MG/DL (ref 8.7–10.5)
CANCER AG125 SERPL-ACNC: 162 U/ML (ref 0–30)
CEA SERPL-MCNC: 2 NG/ML (ref 0–5)
CHLORIDE SERPL-SCNC: 109 MMOL/L (ref 95–110)
CHLORIDE SERPL-SCNC: 112 MMOL/L (ref 95–110)
CHLORIDE SERPL-SCNC: 112 MMOL/L (ref 95–110)
CHLORIDE SERPL-SCNC: 113 MMOL/L (ref 95–110)
CHLORIDE SERPL-SCNC: 116 MMOL/L (ref 95–110)
CHLORIDE SERPL-SCNC: 117 MMOL/L (ref 95–110)
CLARITY UR: ABNORMAL
CO2 SERPL-SCNC: 19 MMOL/L (ref 23–29)
CO2 SERPL-SCNC: 20 MMOL/L (ref 23–29)
CO2 SERPL-SCNC: 24 MMOL/L (ref 23–29)
CO2 SERPL-SCNC: 25 MMOL/L (ref 23–29)
CO2 SERPL-SCNC: 25 MMOL/L (ref 23–29)
CO2 SERPL-SCNC: 26 MMOL/L (ref 23–29)
COLOR UR: YELLOW
CREAT SERPL-MCNC: 1 MG/DL (ref 0.5–1.4)
CREAT SERPL-MCNC: 1.2 MG/DL (ref 0.5–1.4)
CREAT SERPL-MCNC: 1.4 MG/DL (ref 0.5–1.4)
CREAT SERPL-MCNC: 1.4 MG/DL (ref 0.5–1.4)
CREAT SERPL-MCNC: 1.6 MG/DL (ref 0.5–1.4)
CREAT SERPL-MCNC: 1.7 MG/DL (ref 0.5–1.4)
CV ECHO LV RWT: 0.62 CM
DACRYOCYTES BLD QL SMEAR: ABNORMAL
DIFFERENTIAL METHOD BLD: ABNORMAL
DOP CALC AO PEAK VEL: 1.62 M/S
DOP CALC AO VTI: 34.5 CM
DOP CALC LVOT AREA: 4.1 CM2
DOP CALC LVOT DIAMETER: 2.29 CM
DOP CALC LVOT PEAK VEL: 1.02 M/S
DOP CALC LVOT STROKE VOLUME: 108.68 CM3
DOP CALC RVOT PEAK VEL: 0.63 M/S
DOP CALC RVOT VTI: 16.5 CM
DOP CALCLVOT PEAK VEL VTI: 26.4 CM
E COLI SXT1 STL QL IA: NEGATIVE
E COLI SXT2 STL QL IA: NEGATIVE
E WAVE DECELERATION TIME: 237.44 MSEC
E/A RATIO: 0.67
E/E' RATIO: 5.44 M/S
ECHO LV POSTERIOR WALL: 1.5 CM (ref 0.6–1.1)
EOSINOPHIL # BLD AUTO: 0.2 K/UL (ref 0–0.5)
EOSINOPHIL # BLD AUTO: 0.2 K/UL (ref 0–0.5)
EOSINOPHIL # BLD AUTO: 0.3 K/UL (ref 0–0.5)
EOSINOPHIL # BLD AUTO: 0.4 K/UL (ref 0–0.5)
EOSINOPHIL NFR BLD: 0.8 % (ref 0–8)
EOSINOPHIL NFR BLD: 1.2 % (ref 0–8)
EOSINOPHIL NFR BLD: 1.3 % (ref 0–8)
EOSINOPHIL NFR BLD: 1.4 % (ref 0–8)
EOSINOPHIL NFR BLD: 1.4 % (ref 0–8)
EOSINOPHIL NFR BLD: 1.6 % (ref 0–8)
ERYTHROCYTE [DISTWIDTH] IN BLOOD BY AUTOMATED COUNT: 14.1 % (ref 11.5–14.5)
ERYTHROCYTE [DISTWIDTH] IN BLOOD BY AUTOMATED COUNT: 14.3 % (ref 11.5–14.5)
ERYTHROCYTE [DISTWIDTH] IN BLOOD BY AUTOMATED COUNT: 14.5 % (ref 11.5–14.5)
ERYTHROCYTE [DISTWIDTH] IN BLOOD BY AUTOMATED COUNT: 14.6 % (ref 11.5–14.5)
ERYTHROCYTE [DISTWIDTH] IN BLOOD BY AUTOMATED COUNT: 14.8 % (ref 11.5–14.5)
EST. GFR  (NO RACE VARIABLE): 31 ML/MIN/1.73 M^2
EST. GFR  (NO RACE VARIABLE): 33 ML/MIN/1.73 M^2
EST. GFR  (NO RACE VARIABLE): 39 ML/MIN/1.73 M^2
EST. GFR  (NO RACE VARIABLE): 39 ML/MIN/1.73 M^2
EST. GFR  (NO RACE VARIABLE): 47 ML/MIN/1.73 M^2
EST. GFR  (NO RACE VARIABLE): 58 ML/MIN/1.73 M^2
FINAL PATHOLOGIC DIAGNOSIS: ABNORMAL
FRACTIONAL SHORTENING: 36 % (ref 28–44)
GLUCOSE SERPL-MCNC: 112 MG/DL (ref 70–110)
GLUCOSE SERPL-MCNC: 119 MG/DL (ref 70–110)
GLUCOSE SERPL-MCNC: 123 MG/DL (ref 70–110)
GLUCOSE SERPL-MCNC: 145 MG/DL (ref 70–110)
GLUCOSE SERPL-MCNC: 92 MG/DL (ref 70–110)
GLUCOSE SERPL-MCNC: 95 MG/DL (ref 70–110)
GLUCOSE UR QL STRIP: NEGATIVE
GROSS: ABNORMAL
HCT VFR BLD AUTO: 27.9 % (ref 37–48.5)
HCT VFR BLD AUTO: 27.9 % (ref 37–48.5)
HCT VFR BLD AUTO: 28.3 % (ref 37–48.5)
HCT VFR BLD AUTO: 29.1 % (ref 37–48.5)
HCT VFR BLD AUTO: 29.9 % (ref 37–48.5)
HCT VFR BLD AUTO: 30.1 % (ref 37–48.5)
HCT VFR BLD AUTO: 30.6 % (ref 37–48.5)
HCT VFR BLD AUTO: 31.8 % (ref 37–48.5)
HGB BLD-MCNC: 8.7 G/DL (ref 12–16)
HGB BLD-MCNC: 8.8 G/DL (ref 12–16)
HGB BLD-MCNC: 9.1 G/DL (ref 12–16)
HGB BLD-MCNC: 9.1 G/DL (ref 12–16)
HGB BLD-MCNC: 9.7 G/DL (ref 12–16)
HGB BLD-MCNC: 9.9 G/DL (ref 12–16)
HGB UR QL STRIP: NEGATIVE
HYALINE CASTS #/AREA URNS LPF: 1 /LPF
IMM GRANULOCYTES # BLD AUTO: 0.15 K/UL (ref 0–0.04)
IMM GRANULOCYTES # BLD AUTO: 0.17 K/UL (ref 0–0.04)
IMM GRANULOCYTES # BLD AUTO: 0.19 K/UL (ref 0–0.04)
IMM GRANULOCYTES # BLD AUTO: 0.19 K/UL (ref 0–0.04)
IMM GRANULOCYTES # BLD AUTO: 0.2 K/UL (ref 0–0.04)
IMM GRANULOCYTES # BLD AUTO: 0.21 K/UL (ref 0–0.04)
IMM GRANULOCYTES # BLD AUTO: 0.24 K/UL (ref 0–0.04)
IMM GRANULOCYTES # BLD AUTO: 1.08 K/UL (ref 0–0.04)
IMM GRANULOCYTES NFR BLD AUTO: 0.8 % (ref 0–0.5)
IMM GRANULOCYTES NFR BLD AUTO: 0.9 % (ref 0–0.5)
IMM GRANULOCYTES NFR BLD AUTO: 1 % (ref 0–0.5)
IMM GRANULOCYTES NFR BLD AUTO: 1 % (ref 0–0.5)
IMM GRANULOCYTES NFR BLD AUTO: 1.3 % (ref 0–0.5)
IMM GRANULOCYTES NFR BLD AUTO: 4.2 % (ref 0–0.5)
INFLUENZA A, MOLECULAR: NEGATIVE
INFLUENZA B, MOLECULAR: NEGATIVE
INR PPP: 1.2 (ref 0.8–1.2)
INTERVENTRICULAR SEPTUM: 1.66 CM (ref 0.6–1.1)
IVC DIAMETER: 1.35 CM
IVRT: 49.48 MSEC
KETONES UR QL STRIP: NEGATIVE
LA MAJOR: 4.88 CM
LA MINOR: 4.59 CM
LA WIDTH: 3.8 CM
LACTATE SERPL-SCNC: 0.6 MMOL/L (ref 0.5–2.2)
LEFT ATRIUM SIZE: 3.38 CM
LEFT ATRIUM VOLUME INDEX: 30 ML/M2
LEFT ATRIUM VOLUME: 51.65 CM3
LEFT INTERNAL DIMENSION IN SYSTOLE: 3.1 CM (ref 2.1–4)
LEFT VENTRICLE DIASTOLIC VOLUME INDEX: 64.44 ML/M2
LEFT VENTRICLE DIASTOLIC VOLUME: 110.83 ML
LEFT VENTRICLE MASS INDEX: 194 G/M2
LEFT VENTRICLE SYSTOLIC VOLUME INDEX: 22 ML/M2
LEFT VENTRICLE SYSTOLIC VOLUME: 37.87 ML
LEFT VENTRICULAR INTERNAL DIMENSION IN DIASTOLE: 4.86 CM (ref 3.5–6)
LEFT VENTRICULAR MASS: 334.28 G
LEUKOCYTE ESTERASE UR QL STRIP: NEGATIVE
LIPASE SERPL-CCNC: 15 U/L (ref 4–60)
LV LATERAL E/E' RATIO: 5.67 M/S
LV SEPTAL E/E' RATIO: 5.23 M/S
LVOT MG: 3.33 MMHG
LVOT MV: 0.91 CM/S
LYMPHOCYTES # BLD AUTO: 1.2 K/UL (ref 1–4.8)
LYMPHOCYTES # BLD AUTO: 1.2 K/UL (ref 1–4.8)
LYMPHOCYTES # BLD AUTO: 1.3 K/UL (ref 1–4.8)
LYMPHOCYTES # BLD AUTO: 1.4 K/UL (ref 1–4.8)
LYMPHOCYTES # BLD AUTO: 1.5 K/UL (ref 1–4.8)
LYMPHOCYTES # BLD AUTO: 1.6 K/UL (ref 1–4.8)
LYMPHOCYTES NFR BLD: 5.7 % (ref 18–48)
LYMPHOCYTES NFR BLD: 5.8 % (ref 18–48)
LYMPHOCYTES NFR BLD: 6.4 % (ref 18–48)
LYMPHOCYTES NFR BLD: 6.5 % (ref 18–48)
LYMPHOCYTES NFR BLD: 6.5 % (ref 18–48)
LYMPHOCYTES NFR BLD: 6.9 % (ref 18–48)
LYMPHOCYTES NFR BLD: 7.4 % (ref 18–48)
LYMPHOCYTES NFR BLD: 8.4 % (ref 18–48)
Lab: ABNORMAL
MCH RBC QN AUTO: 26.3 PG (ref 27–31)
MCH RBC QN AUTO: 26.3 PG (ref 27–31)
MCH RBC QN AUTO: 26.4 PG (ref 27–31)
MCH RBC QN AUTO: 26.4 PG (ref 27–31)
MCH RBC QN AUTO: 26.6 PG (ref 27–31)
MCH RBC QN AUTO: 26.7 PG (ref 27–31)
MCH RBC QN AUTO: 26.7 PG (ref 27–31)
MCH RBC QN AUTO: 27.1 PG (ref 27–31)
MCHC RBC AUTO-ENTMCNC: 30.2 G/DL (ref 32–36)
MCHC RBC AUTO-ENTMCNC: 30.2 G/DL (ref 32–36)
MCHC RBC AUTO-ENTMCNC: 30.4 G/DL (ref 32–36)
MCHC RBC AUTO-ENTMCNC: 30.7 G/DL (ref 32–36)
MCHC RBC AUTO-ENTMCNC: 31.1 G/DL (ref 32–36)
MCHC RBC AUTO-ENTMCNC: 31.5 G/DL (ref 32–36)
MCHC RBC AUTO-ENTMCNC: 31.5 G/DL (ref 32–36)
MCHC RBC AUTO-ENTMCNC: 31.7 G/DL (ref 32–36)
MCV RBC AUTO: 84 FL (ref 82–98)
MCV RBC AUTO: 85 FL (ref 82–98)
MCV RBC AUTO: 86 FL (ref 82–98)
MCV RBC AUTO: 87 FL (ref 82–98)
MICROSCOPIC COMMENT: NORMAL
MICROSCOPIC EXAM: ABNORMAL
MONOCYTES # BLD AUTO: 1.9 K/UL (ref 0.3–1)
MONOCYTES # BLD AUTO: 1.9 K/UL (ref 0.3–1)
MONOCYTES # BLD AUTO: 2.2 K/UL (ref 0.3–1)
MONOCYTES # BLD AUTO: 2.4 K/UL (ref 0.3–1)
MONOCYTES # BLD AUTO: 2.5 K/UL (ref 0.3–1)
MONOCYTES # BLD AUTO: 2.8 K/UL (ref 0.3–1)
MONOCYTES NFR BLD: 10.3 % (ref 4–15)
MONOCYTES NFR BLD: 10.6 % (ref 4–15)
MONOCYTES NFR BLD: 10.8 % (ref 4–15)
MONOCYTES NFR BLD: 11.4 % (ref 4–15)
MONOCYTES NFR BLD: 11.5 % (ref 4–15)
MONOCYTES NFR BLD: 11.8 % (ref 4–15)
MONOCYTES NFR BLD: 12.5 % (ref 4–15)
MONOCYTES NFR BLD: 8.8 % (ref 4–15)
MV PEAK A VEL: 1.01 M/S
MV PEAK E VEL: 0.68 M/S
MV STENOSIS PRESSURE HALF TIME: 68.86 MS
MV VALVE AREA P 1/2 METHOD: 3.19 CM2
NEUTROPHILS # BLD AUTO: 14.7 K/UL (ref 1.8–7.7)
NEUTROPHILS # BLD AUTO: 14.8 K/UL (ref 1.8–7.7)
NEUTROPHILS # BLD AUTO: 14.9 K/UL (ref 1.8–7.7)
NEUTROPHILS # BLD AUTO: 14.9 K/UL (ref 1.8–7.7)
NEUTROPHILS # BLD AUTO: 17.1 K/UL (ref 1.8–7.7)
NEUTROPHILS # BLD AUTO: 17.6 K/UL (ref 1.8–7.7)
NEUTROPHILS # BLD AUTO: 18.1 K/UL (ref 1.8–7.7)
NEUTROPHILS # BLD AUTO: 19.7 K/UL (ref 1.8–7.7)
NEUTROPHILS NFR BLD: 76.4 % (ref 38–73)
NEUTROPHILS NFR BLD: 76.8 % (ref 38–73)
NEUTROPHILS NFR BLD: 78 % (ref 38–73)
NEUTROPHILS NFR BLD: 78.3 % (ref 38–73)
NEUTROPHILS NFR BLD: 80.4 % (ref 38–73)
NEUTROPHILS NFR BLD: 80.6 % (ref 38–73)
NEUTROPHILS NFR BLD: 80.8 % (ref 38–73)
NEUTROPHILS NFR BLD: 82.3 % (ref 38–73)
NITRITE UR QL STRIP: NEGATIVE
NRBC BLD-RTO: 0 /100 WBC
O+P STL MICRO: NORMAL
O+P STL MICRO: NORMAL
OVALOCYTES BLD QL SMEAR: ABNORMAL
OVALOCYTES BLD QL SMEAR: ABNORMAL
PH UR STRIP: 6 [PH] (ref 5–8)
PISA TR MAX VEL: 2.73 M/S
PLATELET # BLD AUTO: 402 K/UL (ref 150–450)
PLATELET # BLD AUTO: 426 K/UL (ref 150–450)
PLATELET # BLD AUTO: 438 K/UL (ref 150–450)
PLATELET # BLD AUTO: 441 K/UL (ref 150–450)
PLATELET # BLD AUTO: 466 K/UL (ref 150–450)
PLATELET # BLD AUTO: 472 K/UL (ref 150–450)
PLATELET # BLD AUTO: 489 K/UL (ref 150–450)
PLATELET # BLD AUTO: 570 K/UL (ref 150–450)
PLATELET BLD QL SMEAR: ABNORMAL
PMV BLD AUTO: 9.1 FL (ref 9.2–12.9)
PMV BLD AUTO: 9.2 FL (ref 9.2–12.9)
PMV BLD AUTO: 9.2 FL (ref 9.2–12.9)
PMV BLD AUTO: 9.6 FL (ref 9.2–12.9)
PMV BLD AUTO: 9.7 FL (ref 9.2–12.9)
PMV BLD AUTO: 9.9 FL (ref 9.2–12.9)
POCT GLUCOSE: 103 MG/DL (ref 70–110)
POCT GLUCOSE: 104 MG/DL (ref 70–110)
POCT GLUCOSE: 104 MG/DL (ref 70–110)
POCT GLUCOSE: 105 MG/DL (ref 70–110)
POCT GLUCOSE: 106 MG/DL (ref 70–110)
POCT GLUCOSE: 110 MG/DL (ref 70–110)
POCT GLUCOSE: 110 MG/DL (ref 70–110)
POCT GLUCOSE: 111 MG/DL (ref 70–110)
POCT GLUCOSE: 115 MG/DL (ref 70–110)
POCT GLUCOSE: 115 MG/DL (ref 70–110)
POCT GLUCOSE: 116 MG/DL (ref 70–110)
POCT GLUCOSE: 118 MG/DL (ref 70–110)
POCT GLUCOSE: 121 MG/DL (ref 70–110)
POCT GLUCOSE: 122 MG/DL (ref 70–110)
POCT GLUCOSE: 122 MG/DL (ref 70–110)
POCT GLUCOSE: 124 MG/DL (ref 70–110)
POCT GLUCOSE: 128 MG/DL (ref 70–110)
POCT GLUCOSE: 91 MG/DL (ref 70–110)
POCT GLUCOSE: 97 MG/DL (ref 70–110)
POIKILOCYTOSIS BLD QL SMEAR: SLIGHT
POIKILOCYTOSIS BLD QL SMEAR: SLIGHT
POTASSIUM SERPL-SCNC: 3.4 MMOL/L (ref 3.5–5.1)
POTASSIUM SERPL-SCNC: 3.6 MMOL/L (ref 3.5–5.1)
POTASSIUM SERPL-SCNC: 3.7 MMOL/L (ref 3.5–5.1)
POTASSIUM SERPL-SCNC: 3.8 MMOL/L (ref 3.5–5.1)
PROCALCITONIN SERPL IA-MCNC: 0.46 NG/ML
PROCALCITONIN SERPL IA-MCNC: 0.52 NG/ML
PROT SERPL-MCNC: 5.6 G/DL (ref 6–8.4)
PROT SERPL-MCNC: 5.7 G/DL (ref 6–8.4)
PROT SERPL-MCNC: 5.8 G/DL (ref 6–8.4)
PROT SERPL-MCNC: 6.2 G/DL (ref 6–8.4)
PROT UR QL STRIP: ABNORMAL
PROTHROMBIN TIME: 12.1 SEC (ref 9–12.5)
PV MEAN GRADIENT: 1 MMHG
RA MAJOR: 4.86 CM
RA PRESSURE ESTIMATED: 3 MMHG
RA WIDTH: 3.1 CM
RBC # BLD AUTO: 3.25 M/UL (ref 4–5.4)
RBC # BLD AUTO: 3.29 M/UL (ref 4–5.4)
RBC # BLD AUTO: 3.29 M/UL (ref 4–5.4)
RBC # BLD AUTO: 3.35 M/UL (ref 4–5.4)
RBC # BLD AUTO: 3.45 M/UL (ref 4–5.4)
RBC # BLD AUTO: 3.46 M/UL (ref 4–5.4)
RBC # BLD AUTO: 3.63 M/UL (ref 4–5.4)
RBC # BLD AUTO: 3.72 M/UL (ref 4–5.4)
RBC #/AREA URNS HPF: 2 /HPF (ref 0–4)
RV MID DIAMA: 3.46 CM
RV TB RVSP: 6 MMHG
SARS-COV-2 RDRP RESP QL NAA+PROBE: NEGATIVE
SCHISTOCYTES BLD QL SMEAR: PRESENT
SODIUM SERPL-SCNC: 141 MMOL/L (ref 136–145)
SODIUM SERPL-SCNC: 142 MMOL/L (ref 136–145)
SODIUM SERPL-SCNC: 142 MMOL/L (ref 136–145)
SODIUM SERPL-SCNC: 144 MMOL/L (ref 136–145)
SODIUM SERPL-SCNC: 145 MMOL/L (ref 136–145)
SODIUM SERPL-SCNC: 147 MMOL/L (ref 136–145)
SP GR UR STRIP: 1.02 (ref 1–1.03)
SPECIMEN SOURCE: NORMAL
SPHEROCYTES BLD QL SMEAR: ABNORMAL
STJ: 3.54 CM
TDI LATERAL: 0.12 M/S
TDI SEPTAL: 0.13 M/S
TDI: 0.13 M/S
TR MAX PG: 30 MMHG
TRICUSPID ANNULAR PLANE SYSTOLIC EXCURSION: 2.13 CM
TROPONIN I SERPL DL<=0.01 NG/ML-MCNC: 0.01 NG/ML (ref 0–0.03)
TV REST PULMONARY ARTERY PRESSURE: 33 MMHG
URATE CRY URNS QL MICRO: NORMAL
URN SPEC COLLECT METH UR: ABNORMAL
UROBILINOGEN UR STRIP-ACNC: NEGATIVE EU/DL
WBC # BLD AUTO: 18.29 K/UL (ref 3.9–12.7)
WBC # BLD AUTO: 18.87 K/UL (ref 3.9–12.7)
WBC # BLD AUTO: 19 K/UL (ref 3.9–12.7)
WBC # BLD AUTO: 19.46 K/UL (ref 3.9–12.7)
WBC # BLD AUTO: 21.29 K/UL (ref 3.9–12.7)
WBC # BLD AUTO: 21.73 K/UL (ref 3.9–12.7)
WBC # BLD AUTO: 21.94 K/UL (ref 3.9–12.7)
WBC # BLD AUTO: 25.66 K/UL (ref 3.9–12.7)
WBC #/AREA URNS HPF: 5 /HPF (ref 0–5)
Z-SCORE OF LEFT VENTRICULAR DIMENSION IN END DIASTOLE: 0.19
Z-SCORE OF LEFT VENTRICULAR DIMENSION IN END SYSTOLE: 0.38

## 2024-01-01 PROCEDURE — 63600175 PHARM REV CODE 636 W HCPCS: Performed by: HOSPITALIST

## 2024-01-01 PROCEDURE — 97162 PT EVAL MOD COMPLEX 30 MIN: CPT

## 2024-01-01 PROCEDURE — 25000003 PHARM REV CODE 250: Performed by: STUDENT IN AN ORGANIZED HEALTH CARE EDUCATION/TRAINING PROGRAM

## 2024-01-01 PROCEDURE — 3078F DIAST BP <80 MM HG: CPT | Mod: CPTII,S$GLB,, | Performed by: INTERNAL MEDICINE

## 2024-01-01 PROCEDURE — 1160F RVW MEDS BY RX/DR IN RCRD: CPT | Mod: CPTII,S$GLB,, | Performed by: INTERNAL MEDICINE

## 2024-01-01 PROCEDURE — 94799 UNLISTED PULMONARY SVC/PX: CPT | Mod: XB

## 2024-01-01 PROCEDURE — 63600175 PHARM REV CODE 636 W HCPCS: Performed by: STUDENT IN AN ORGANIZED HEALTH CARE EDUCATION/TRAINING PROGRAM

## 2024-01-01 PROCEDURE — 21400001 HC TELEMETRY ROOM

## 2024-01-01 PROCEDURE — 36415 COLL VENOUS BLD VENIPUNCTURE: CPT | Performed by: STUDENT IN AN ORGANIZED HEALTH CARE EDUCATION/TRAINING PROGRAM

## 2024-01-01 PROCEDURE — 85025 COMPLETE CBC W/AUTO DIFF WBC: CPT | Performed by: NURSE PRACTITIONER

## 2024-01-01 PROCEDURE — 1101F PT FALLS ASSESS-DOCD LE1/YR: CPT | Mod: CPTII,S$GLB,, | Performed by: INTERNAL MEDICINE

## 2024-01-01 PROCEDURE — 3288F FALL RISK ASSESSMENT DOCD: CPT | Mod: CPTII,S$GLB,, | Performed by: INTERNAL MEDICINE

## 2024-01-01 PROCEDURE — 27000221 HC OXYGEN, UP TO 24 HOURS

## 2024-01-01 PROCEDURE — 25000003 PHARM REV CODE 250: Performed by: HOSPITALIST

## 2024-01-01 PROCEDURE — 87502 INFLUENZA DNA AMP PROBE: CPT | Performed by: STUDENT IN AN ORGANIZED HEALTH CARE EDUCATION/TRAINING PROGRAM

## 2024-01-01 PROCEDURE — 3075F SYST BP GE 130 - 139MM HG: CPT | Mod: CPTII,S$GLB,, | Performed by: NURSE PRACTITIONER

## 2024-01-01 PROCEDURE — 82105 ALPHA-FETOPROTEIN SERUM: CPT | Performed by: HOSPITALIST

## 2024-01-01 PROCEDURE — 97530 THERAPEUTIC ACTIVITIES: CPT

## 2024-01-01 PROCEDURE — 1111F DSCHRG MED/CURRENT MED MERGE: CPT | Mod: CPTII,S$GLB,, | Performed by: NURSE PRACTITIONER

## 2024-01-01 PROCEDURE — 3078F DIAST BP <80 MM HG: CPT | Mod: CPTII,S$GLB,, | Performed by: NURSE PRACTITIONER

## 2024-01-01 PROCEDURE — 82378 CARCINOEMBRYONIC ANTIGEN: CPT | Performed by: HOSPITALIST

## 2024-01-01 PROCEDURE — 85025 COMPLETE CBC W/AUTO DIFF WBC: CPT | Performed by: STUDENT IN AN ORGANIZED HEALTH CARE EDUCATION/TRAINING PROGRAM

## 2024-01-01 PROCEDURE — 99233 SBSQ HOSP IP/OBS HIGH 50: CPT | Mod: NSCH,,, | Performed by: INTERNAL MEDICINE

## 2024-01-01 PROCEDURE — 94761 N-INVAS EAR/PLS OXIMETRY MLT: CPT

## 2024-01-01 PROCEDURE — 11000001 HC ACUTE MED/SURG PRIVATE ROOM

## 2024-01-01 PROCEDURE — 80053 COMPREHEN METABOLIC PANEL: CPT | Performed by: HOSPITALIST

## 2024-01-01 PROCEDURE — 1125F AMNT PAIN NOTED PAIN PRSNT: CPT | Mod: CPTII,S$GLB,, | Performed by: INTERNAL MEDICINE

## 2024-01-01 PROCEDURE — 63600175 PHARM REV CODE 636 W HCPCS: Performed by: INTERNAL MEDICINE

## 2024-01-01 PROCEDURE — 99496 TRANSJ CARE MGMT HIGH F2F 7D: CPT | Mod: S$GLB,,, | Performed by: NURSE PRACTITIONER

## 2024-01-01 PROCEDURE — 99900035 HC TECH TIME PER 15 MIN (STAT)

## 2024-01-01 PROCEDURE — 63600175 PHARM REV CODE 636 W HCPCS: Performed by: NURSE PRACTITIONER

## 2024-01-01 PROCEDURE — 25000003 PHARM REV CODE 250: Performed by: INTERNAL MEDICINE

## 2024-01-01 PROCEDURE — 96375 TX/PRO/DX INJ NEW DRUG ADDON: CPT

## 2024-01-01 PROCEDURE — 36415 COLL VENOUS BLD VENIPUNCTURE: CPT | Mod: XB | Performed by: STUDENT IN AN ORGANIZED HEALTH CARE EDUCATION/TRAINING PROGRAM

## 2024-01-01 PROCEDURE — 99285 EMERGENCY DEPT VISIT HI MDM: CPT | Mod: 25

## 2024-01-01 PROCEDURE — 80048 BASIC METABOLIC PNL TOTAL CA: CPT | Performed by: STUDENT IN AN ORGANIZED HEALTH CARE EDUCATION/TRAINING PROGRAM

## 2024-01-01 PROCEDURE — 0FB13ZX EXCISION OF RIGHT LOBE LIVER, PERCUTANEOUS APPROACH, DIAGNOSTIC: ICD-10-PCS | Performed by: RADIOLOGY

## 2024-01-01 PROCEDURE — 85610 PROTHROMBIN TIME: CPT | Performed by: RADIOLOGY

## 2024-01-01 PROCEDURE — 83690 ASSAY OF LIPASE: CPT | Performed by: NURSE PRACTITIONER

## 2024-01-01 PROCEDURE — 83880 ASSAY OF NATRIURETIC PEPTIDE: CPT | Performed by: STUDENT IN AN ORGANIZED HEALTH CARE EDUCATION/TRAINING PROGRAM

## 2024-01-01 PROCEDURE — 84145 PROCALCITONIN (PCT): CPT | Performed by: INTERNAL MEDICINE

## 2024-01-01 PROCEDURE — 87449 NOS EACH ORGANISM AG IA: CPT | Performed by: STUDENT IN AN ORGANIZED HEALTH CARE EDUCATION/TRAINING PROGRAM

## 2024-01-01 PROCEDURE — 1159F MED LIST DOCD IN RCRD: CPT | Mod: CPTII,S$GLB,, | Performed by: INTERNAL MEDICINE

## 2024-01-01 PROCEDURE — 80053 COMPREHEN METABOLIC PANEL: CPT | Performed by: NURSE PRACTITIONER

## 2024-01-01 PROCEDURE — 85025 COMPLETE CBC W/AUTO DIFF WBC: CPT | Mod: 91 | Performed by: STUDENT IN AN ORGANIZED HEALTH CARE EDUCATION/TRAINING PROGRAM

## 2024-01-01 PROCEDURE — 85025 COMPLETE CBC W/AUTO DIFF WBC: CPT | Performed by: HOSPITALIST

## 2024-01-01 PROCEDURE — 81000 URINALYSIS NONAUTO W/SCOPE: CPT | Performed by: NURSE PRACTITIONER

## 2024-01-01 PROCEDURE — 83605 ASSAY OF LACTIC ACID: CPT | Performed by: FAMILY MEDICINE

## 2024-01-01 PROCEDURE — 36415 COLL VENOUS BLD VENIPUNCTURE: CPT | Performed by: HOSPITALIST

## 2024-01-01 PROCEDURE — 3074F SYST BP LT 130 MM HG: CPT | Mod: CPTII,S$GLB,, | Performed by: INTERNAL MEDICINE

## 2024-01-01 PROCEDURE — 99999 PR PBB SHADOW E&M-EST. PATIENT-LVL V: CPT | Mod: PBBFAC,,, | Performed by: INTERNAL MEDICINE

## 2024-01-01 PROCEDURE — 84484 ASSAY OF TROPONIN QUANT: CPT | Performed by: STUDENT IN AN ORGANIZED HEALTH CARE EDUCATION/TRAINING PROGRAM

## 2024-01-01 PROCEDURE — 25000003 PHARM REV CODE 250: Performed by: RADIOLOGY

## 2024-01-01 PROCEDURE — 63600175 PHARM REV CODE 636 W HCPCS: Performed by: RADIOLOGY

## 2024-01-01 PROCEDURE — 96361 HYDRATE IV INFUSION ADD-ON: CPT

## 2024-01-01 PROCEDURE — 87209 SMEAR COMPLEX STAIN: CPT | Performed by: STUDENT IN AN ORGANIZED HEALTH CARE EDUCATION/TRAINING PROGRAM

## 2024-01-01 PROCEDURE — 1160F RVW MEDS BY RX/DR IN RCRD: CPT | Mod: CPTII,S$GLB,, | Performed by: NURSE PRACTITIONER

## 2024-01-01 PROCEDURE — 96365 THER/PROPH/DIAG IV INF INIT: CPT

## 2024-01-01 PROCEDURE — 36415 COLL VENOUS BLD VENIPUNCTURE: CPT | Performed by: RADIOLOGY

## 2024-01-01 PROCEDURE — 99215 OFFICE O/P EST HI 40 MIN: CPT | Mod: S$GLB,,, | Performed by: INTERNAL MEDICINE

## 2024-01-01 PROCEDURE — 87040 BLOOD CULTURE FOR BACTERIA: CPT | Performed by: FAMILY MEDICINE

## 2024-01-01 PROCEDURE — 36415 COLL VENOUS BLD VENIPUNCTURE: CPT | Performed by: INTERNAL MEDICINE

## 2024-01-01 PROCEDURE — 87045 FECES CULTURE AEROBIC BACT: CPT | Performed by: STUDENT IN AN ORGANIZED HEALTH CARE EDUCATION/TRAINING PROGRAM

## 2024-01-01 PROCEDURE — 86304 IMMUNOASSAY TUMOR CA 125: CPT | Performed by: HOSPITALIST

## 2024-01-01 PROCEDURE — 1111F DSCHRG MED/CURRENT MED MERGE: CPT | Mod: CPTII,S$GLB,, | Performed by: INTERNAL MEDICINE

## 2024-01-01 PROCEDURE — 97116 GAIT TRAINING THERAPY: CPT

## 2024-01-01 PROCEDURE — U0002 COVID-19 LAB TEST NON-CDC: HCPCS | Performed by: STUDENT IN AN ORGANIZED HEALTH CARE EDUCATION/TRAINING PROGRAM

## 2024-01-01 PROCEDURE — 63600175 PHARM REV CODE 636 W HCPCS: Performed by: FAMILY MEDICINE

## 2024-01-01 PROCEDURE — 84145 PROCALCITONIN (PCT): CPT | Performed by: STUDENT IN AN ORGANIZED HEALTH CARE EDUCATION/TRAINING PROGRAM

## 2024-01-01 PROCEDURE — 99223 1ST HOSP IP/OBS HIGH 75: CPT | Mod: NSCH,,, | Performed by: INTERNAL MEDICINE

## 2024-01-01 PROCEDURE — 1159F MED LIST DOCD IN RCRD: CPT | Mod: CPTII,S$GLB,, | Performed by: NURSE PRACTITIONER

## 2024-01-01 PROCEDURE — 82140 ASSAY OF AMMONIA: CPT | Performed by: STUDENT IN AN ORGANIZED HEALTH CARE EDUCATION/TRAINING PROGRAM

## 2024-01-01 PROCEDURE — 97166 OT EVAL MOD COMPLEX 45 MIN: CPT

## 2024-01-01 PROCEDURE — 87046 STOOL CULTR AEROBIC BACT EA: CPT | Performed by: STUDENT IN AN ORGANIZED HEALTH CARE EDUCATION/TRAINING PROGRAM

## 2024-01-01 PROCEDURE — 99233 SBSQ HOSP IP/OBS HIGH 50: CPT | Mod: ,,, | Performed by: INTERNAL MEDICINE

## 2024-01-01 PROCEDURE — 25000003 PHARM REV CODE 250: Performed by: FAMILY MEDICINE

## 2024-01-01 PROCEDURE — 87427 SHIGA-LIKE TOXIN AG IA: CPT | Mod: 59 | Performed by: STUDENT IN AN ORGANIZED HEALTH CARE EDUCATION/TRAINING PROGRAM

## 2024-01-01 RX ORDER — IPRATROPIUM BROMIDE AND ALBUTEROL SULFATE 2.5; .5 MG/3ML; MG/3ML
3 SOLUTION RESPIRATORY (INHALATION) EVERY 6 HOURS PRN
Status: DISCONTINUED | OUTPATIENT
Start: 2024-01-01 | End: 2024-01-01 | Stop reason: HOSPADM

## 2024-01-01 RX ORDER — AMLODIPINE BESYLATE 5 MG/1
5 TABLET ORAL DAILY
Status: DISCONTINUED | OUTPATIENT
Start: 2024-01-01 | End: 2024-01-01 | Stop reason: HOSPADM

## 2024-01-01 RX ORDER — POTASSIUM CHLORIDE 20 MEQ/1
40 TABLET, EXTENDED RELEASE ORAL ONCE
Status: COMPLETED | OUTPATIENT
Start: 2024-01-01 | End: 2024-01-01

## 2024-01-01 RX ORDER — INSULIN ASPART 100 [IU]/ML
0-5 INJECTION, SOLUTION INTRAVENOUS; SUBCUTANEOUS
Status: DISCONTINUED | OUTPATIENT
Start: 2024-01-01 | End: 2024-01-01 | Stop reason: HOSPADM

## 2024-01-01 RX ORDER — AMOXICILLIN 250 MG
1 CAPSULE ORAL 2 TIMES DAILY PRN
Status: DISCONTINUED | OUTPATIENT
Start: 2024-01-01 | End: 2024-01-01 | Stop reason: HOSPADM

## 2024-01-01 RX ORDER — AZITHROMYCIN 500 MG/1
500 TABLET, FILM COATED ORAL DAILY
Qty: 7 TABLET | Refills: 0 | Status: SHIPPED | OUTPATIENT
Start: 2024-01-01 | End: 2024-01-01

## 2024-01-01 RX ORDER — LINEZOLID 600 MG/1
600 TABLET, FILM COATED ORAL EVERY 12 HOURS
Status: DISCONTINUED | OUTPATIENT
Start: 2024-01-01 | End: 2024-01-01

## 2024-01-01 RX ORDER — LOSARTAN POTASSIUM 50 MG/1
100 TABLET ORAL DAILY
Status: DISCONTINUED | OUTPATIENT
Start: 2024-01-01 | End: 2024-01-01

## 2024-01-01 RX ORDER — CHOLESTYRAMINE 4 G/9G
1 POWDER, FOR SUSPENSION ORAL 2 TIMES DAILY
Status: DISCONTINUED | OUTPATIENT
Start: 2024-01-01 | End: 2024-01-01 | Stop reason: HOSPADM

## 2024-01-01 RX ORDER — CARVEDILOL 12.5 MG/1
25 TABLET ORAL 2 TIMES DAILY WITH MEALS
Status: DISCONTINUED | OUTPATIENT
Start: 2024-01-01 | End: 2024-01-01 | Stop reason: HOSPADM

## 2024-01-01 RX ORDER — THIAMINE HCL 100 MG
100 TABLET ORAL DAILY
Status: DISCONTINUED | OUTPATIENT
Start: 2024-01-01 | End: 2024-01-01 | Stop reason: HOSPADM

## 2024-01-01 RX ORDER — ONDANSETRON HYDROCHLORIDE 2 MG/ML
4 INJECTION, SOLUTION INTRAVENOUS EVERY 8 HOURS PRN
Status: DISCONTINUED | OUTPATIENT
Start: 2024-01-01 | End: 2024-01-01 | Stop reason: HOSPADM

## 2024-01-01 RX ORDER — SODIUM CHLORIDE 9 MG/ML
INJECTION, SOLUTION INTRAVENOUS CONTINUOUS
Status: DISCONTINUED | OUTPATIENT
Start: 2024-01-01 | End: 2024-01-01

## 2024-01-01 RX ORDER — ACETAMINOPHEN 650 MG/1
650 SUPPOSITORY RECTAL EVERY 6 HOURS PRN
Status: DISCONTINUED | OUTPATIENT
Start: 2024-01-01 | End: 2024-01-01 | Stop reason: HOSPADM

## 2024-01-01 RX ORDER — AMOXICILLIN 250 MG
2 CAPSULE ORAL DAILY PRN
COMMUNITY
Start: 2023-01-01

## 2024-01-01 RX ORDER — LIDOCAINE HYDROCHLORIDE 10 MG/ML
INJECTION INFILTRATION; PERINEURAL CODE/TRAUMA/SEDATION MEDICATION
Status: COMPLETED | OUTPATIENT
Start: 2024-01-01 | End: 2024-01-01

## 2024-01-01 RX ORDER — IBUPROFEN 200 MG
24 TABLET ORAL
Status: DISCONTINUED | OUTPATIENT
Start: 2024-01-01 | End: 2024-01-01 | Stop reason: HOSPADM

## 2024-01-01 RX ORDER — GLUCAGON 1 MG
1 KIT INJECTION
Status: DISCONTINUED | OUTPATIENT
Start: 2024-01-01 | End: 2024-01-01 | Stop reason: HOSPADM

## 2024-01-01 RX ORDER — ATORVASTATIN CALCIUM 40 MG/1
40 TABLET, FILM COATED ORAL DAILY
Status: DISCONTINUED | OUTPATIENT
Start: 2024-01-01 | End: 2024-01-01 | Stop reason: HOSPADM

## 2024-01-01 RX ORDER — NAPROXEN SODIUM 220 MG/1
81 TABLET, FILM COATED ORAL DAILY
Status: DISCONTINUED | OUTPATIENT
Start: 2024-01-01 | End: 2024-01-01

## 2024-01-01 RX ORDER — HYDROCODONE BITARTRATE AND ACETAMINOPHEN 5; 325 MG/1; MG/1
1 TABLET ORAL EVERY 6 HOURS PRN
Status: DISCONTINUED | OUTPATIENT
Start: 2024-01-01 | End: 2024-01-01 | Stop reason: HOSPADM

## 2024-01-01 RX ORDER — KETOROLAC TROMETHAMINE 30 MG/ML
15 INJECTION, SOLUTION INTRAMUSCULAR; INTRAVENOUS
Status: COMPLETED | OUTPATIENT
Start: 2024-01-01 | End: 2024-01-01

## 2024-01-01 RX ORDER — FENTANYL CITRATE 50 UG/ML
INJECTION, SOLUTION INTRAMUSCULAR; INTRAVENOUS CODE/TRAUMA/SEDATION MEDICATION
Status: COMPLETED | OUTPATIENT
Start: 2024-01-01 | End: 2024-01-01

## 2024-01-01 RX ORDER — LEVOTHYROXINE SODIUM 88 UG/1
88 TABLET ORAL
Status: DISCONTINUED | OUTPATIENT
Start: 2024-01-01 | End: 2024-01-01 | Stop reason: HOSPADM

## 2024-01-01 RX ORDER — SODIUM CHLORIDE, SODIUM LACTATE, POTASSIUM CHLORIDE, CALCIUM CHLORIDE 600; 310; 30; 20 MG/100ML; MG/100ML; MG/100ML; MG/100ML
INJECTION, SOLUTION INTRAVENOUS CONTINUOUS
Status: ACTIVE | OUTPATIENT
Start: 2024-01-01 | End: 2024-01-01

## 2024-01-01 RX ORDER — HYDRALAZINE HYDROCHLORIDE 10 MG/1
10 TABLET, FILM COATED ORAL 3 TIMES DAILY
Status: DISCONTINUED | OUTPATIENT
Start: 2024-01-01 | End: 2024-01-01 | Stop reason: HOSPADM

## 2024-01-01 RX ORDER — LOPERAMIDE HYDROCHLORIDE 2 MG/1
2 CAPSULE ORAL ONCE
Status: COMPLETED | OUTPATIENT
Start: 2024-01-01 | End: 2024-01-01

## 2024-01-01 RX ORDER — MORPHINE SULFATE 4 MG/ML
4 INJECTION, SOLUTION INTRAMUSCULAR; INTRAVENOUS
Status: COMPLETED | OUTPATIENT
Start: 2024-01-01 | End: 2024-01-01

## 2024-01-01 RX ORDER — SODIUM CHLORIDE 0.9 % (FLUSH) 0.9 %
10 SYRINGE (ML) INJECTION EVERY 12 HOURS PRN
Status: DISCONTINUED | OUTPATIENT
Start: 2024-01-01 | End: 2024-01-01 | Stop reason: HOSPADM

## 2024-01-01 RX ORDER — PREDNISONE 50 MG/1
TABLET ORAL
Status: ON HOLD | COMMUNITY
Start: 2024-01-01 | End: 2024-01-01 | Stop reason: HOSPADM

## 2024-01-01 RX ORDER — NALOXONE HCL 0.4 MG/ML
0.02 VIAL (ML) INJECTION
Status: DISCONTINUED | OUTPATIENT
Start: 2024-01-01 | End: 2024-01-01 | Stop reason: HOSPADM

## 2024-01-01 RX ORDER — ENOXAPARIN SODIUM 100 MG/ML
40 INJECTION SUBCUTANEOUS EVERY 24 HOURS
Status: DISCONTINUED | OUTPATIENT
Start: 2024-01-01 | End: 2024-01-01

## 2024-01-01 RX ORDER — IBUPROFEN 200 MG
16 TABLET ORAL
Status: DISCONTINUED | OUTPATIENT
Start: 2024-01-01 | End: 2024-01-01 | Stop reason: HOSPADM

## 2024-01-01 RX ORDER — MORPHINE SULFATE 4 MG/ML
2 INJECTION, SOLUTION INTRAMUSCULAR; INTRAVENOUS EVERY 4 HOURS PRN
Status: DISCONTINUED | OUTPATIENT
Start: 2024-01-01 | End: 2024-01-01 | Stop reason: HOSPADM

## 2024-01-01 RX ORDER — ONDANSETRON HYDROCHLORIDE 2 MG/ML
4 INJECTION, SOLUTION INTRAVENOUS ONCE
Status: COMPLETED | OUTPATIENT
Start: 2024-01-01 | End: 2024-01-01

## 2024-01-01 RX ORDER — PROMETHAZINE HYDROCHLORIDE 25 MG/1
25 TABLET ORAL EVERY 6 HOURS PRN
Status: DISCONTINUED | OUTPATIENT
Start: 2024-01-01 | End: 2024-01-01 | Stop reason: HOSPADM

## 2024-01-01 RX ORDER — ALUMINUM HYDROXIDE, MAGNESIUM HYDROXIDE, AND SIMETHICONE 1200; 120; 1200 MG/30ML; MG/30ML; MG/30ML
30 SUSPENSION ORAL 4 TIMES DAILY PRN
Status: DISCONTINUED | OUTPATIENT
Start: 2024-01-01 | End: 2024-01-01 | Stop reason: HOSPADM

## 2024-01-01 RX ORDER — LORAZEPAM 2 MG/ML
1 INJECTION INTRAMUSCULAR ONCE
Status: COMPLETED | OUTPATIENT
Start: 2024-01-01 | End: 2024-01-01

## 2024-01-01 RX ORDER — TALC
6 POWDER (GRAM) TOPICAL NIGHTLY PRN
Status: DISCONTINUED | OUTPATIENT
Start: 2024-01-01 | End: 2024-01-01 | Stop reason: HOSPADM

## 2024-01-01 RX ORDER — MIDAZOLAM HYDROCHLORIDE 1 MG/ML
INJECTION INTRAMUSCULAR; INTRAVENOUS CODE/TRAUMA/SEDATION MEDICATION
Status: COMPLETED | OUTPATIENT
Start: 2024-01-01 | End: 2024-01-01

## 2024-01-01 RX ORDER — DEXTROSE MONOHYDRATE AND SODIUM CHLORIDE 5; .9 G/100ML; G/100ML
INJECTION, SOLUTION INTRAVENOUS CONTINUOUS
Status: DISCONTINUED | OUTPATIENT
Start: 2024-01-01 | End: 2024-01-01

## 2024-01-01 RX ORDER — ACETAMINOPHEN 325 MG/1
650 TABLET ORAL EVERY 8 HOURS PRN
Status: DISCONTINUED | OUTPATIENT
Start: 2024-01-01 | End: 2024-01-01 | Stop reason: HOSPADM

## 2024-01-01 RX ADMIN — HYDROCODONE BITARTRATE AND ACETAMINOPHEN 1 TABLET: 5; 325 TABLET ORAL at 03:01

## 2024-01-01 RX ADMIN — LEVOTHYROXINE SODIUM 88 MCG: 88 TABLET ORAL at 05:01

## 2024-01-01 RX ADMIN — FENTANYL CITRATE 50 MCG: 0.05 INJECTION, SOLUTION INTRAMUSCULAR; INTRAVENOUS at 03:01

## 2024-01-01 RX ADMIN — PIPERACILLIN SODIUM AND TAZOBACTAM SODIUM 4.5 G: 4; .5 INJECTION, POWDER, FOR SOLUTION INTRAVENOUS at 03:01

## 2024-01-01 RX ADMIN — ATORVASTATIN CALCIUM 40 MG: 40 TABLET, FILM COATED ORAL at 10:01

## 2024-01-01 RX ADMIN — SODIUM CHLORIDE: 9 INJECTION, SOLUTION INTRAVENOUS at 09:01

## 2024-01-01 RX ADMIN — ATORVASTATIN CALCIUM 40 MG: 40 TABLET, FILM COATED ORAL at 09:01

## 2024-01-01 RX ADMIN — THERA TABS 1 TABLET: TAB at 08:01

## 2024-01-01 RX ADMIN — PIPERACILLIN SODIUM AND TAZOBACTAM SODIUM 4.5 G: 4; .5 INJECTION, POWDER, FOR SOLUTION INTRAVENOUS at 06:01

## 2024-01-01 RX ADMIN — Medication 6 MG: at 09:01

## 2024-01-01 RX ADMIN — HYDROCODONE BITARTRATE AND ACETAMINOPHEN 1 TABLET: 5; 325 TABLET ORAL at 05:01

## 2024-01-01 RX ADMIN — HYDRALAZINE HYDROCHLORIDE 10 MG: 10 TABLET, FILM COATED ORAL at 09:01

## 2024-01-01 RX ADMIN — KETOROLAC TROMETHAMINE 15 MG: 30 INJECTION, SOLUTION INTRAMUSCULAR; INTRAVENOUS at 05:01

## 2024-01-01 RX ADMIN — PIPERACILLIN SODIUM AND TAZOBACTAM SODIUM 4.5 G: 4; .5 INJECTION, POWDER, FOR SOLUTION INTRAVENOUS at 04:01

## 2024-01-01 RX ADMIN — HYDRALAZINE HYDROCHLORIDE 10 MG: 10 TABLET, FILM COATED ORAL at 08:01

## 2024-01-01 RX ADMIN — CHOLESTYRAMINE 4 G: 4 POWDER, FOR SUSPENSION ORAL at 09:01

## 2024-01-01 RX ADMIN — LINEZOLID 600 MG: 600 TABLET, FILM COATED ORAL at 10:01

## 2024-01-01 RX ADMIN — ATORVASTATIN CALCIUM 40 MG: 40 TABLET, FILM COATED ORAL at 01:01

## 2024-01-01 RX ADMIN — SODIUM CHLORIDE 1000 ML: 9 INJECTION, SOLUTION INTRAVENOUS at 04:01

## 2024-01-01 RX ADMIN — DEXTROSE AND SODIUM CHLORIDE: 5; 900 INJECTION, SOLUTION INTRAVENOUS at 09:01

## 2024-01-01 RX ADMIN — PROMETHAZINE HYDROCHLORIDE 25 MG: 25 TABLET ORAL at 08:01

## 2024-01-01 RX ADMIN — LOPERAMIDE HYDROCHLORIDE 2 MG: 2 CAPSULE ORAL at 11:01

## 2024-01-01 RX ADMIN — LINEZOLID 600 MG: 600 TABLET, FILM COATED ORAL at 09:01

## 2024-01-01 RX ADMIN — AMLODIPINE BESYLATE 5 MG: 5 TABLET ORAL at 09:01

## 2024-01-01 RX ADMIN — HYDRALAZINE HYDROCHLORIDE 10 MG: 10 TABLET, FILM COATED ORAL at 04:01

## 2024-01-01 RX ADMIN — CEFTRIAXONE 2 G: 2 INJECTION, POWDER, FOR SOLUTION INTRAMUSCULAR; INTRAVENOUS at 08:01

## 2024-01-01 RX ADMIN — CARVEDILOL 25 MG: 12.5 TABLET, FILM COATED ORAL at 09:01

## 2024-01-01 RX ADMIN — SODIUM CHLORIDE, POTASSIUM CHLORIDE, SODIUM LACTATE AND CALCIUM CHLORIDE: 600; 310; 30; 20 INJECTION, SOLUTION INTRAVENOUS at 08:01

## 2024-01-01 RX ADMIN — ENOXAPARIN SODIUM 40 MG: 40 INJECTION SUBCUTANEOUS at 12:01

## 2024-01-01 RX ADMIN — ATORVASTATIN CALCIUM 40 MG: 40 TABLET, FILM COATED ORAL at 08:01

## 2024-01-01 RX ADMIN — PIPERACILLIN SODIUM AND TAZOBACTAM SODIUM 4.5 G: 4; .5 INJECTION, POWDER, FOR SOLUTION INTRAVENOUS at 10:01

## 2024-01-01 RX ADMIN — CARVEDILOL 25 MG: 12.5 TABLET, FILM COATED ORAL at 08:01

## 2024-01-01 RX ADMIN — ONDANSETRON 4 MG: 2 INJECTION INTRAMUSCULAR; INTRAVENOUS at 07:01

## 2024-01-01 RX ADMIN — PIPERACILLIN SODIUM AND TAZOBACTAM SODIUM 4.5 G: 4; .5 INJECTION, POWDER, FOR SOLUTION INTRAVENOUS at 11:01

## 2024-01-01 RX ADMIN — LINEZOLID 600 MG: 600 TABLET, FILM COATED ORAL at 08:01

## 2024-01-01 RX ADMIN — CHOLESTYRAMINE 4 G: 4 POWDER, FOR SUSPENSION ORAL at 08:01

## 2024-01-01 RX ADMIN — DEXTROSE AND SODIUM CHLORIDE: 5; 900 INJECTION, SOLUTION INTRAVENOUS at 01:01

## 2024-01-01 RX ADMIN — CEFTRIAXONE 2 G: 2 INJECTION, POWDER, FOR SOLUTION INTRAMUSCULAR; INTRAVENOUS at 09:01

## 2024-01-01 RX ADMIN — AMLODIPINE BESYLATE 5 MG: 5 TABLET ORAL at 08:01

## 2024-01-01 RX ADMIN — HYDRALAZINE HYDROCHLORIDE 10 MG: 10 TABLET, FILM COATED ORAL at 10:01

## 2024-01-01 RX ADMIN — HYDROCODONE BITARTRATE AND ACETAMINOPHEN 1 TABLET: 5; 325 TABLET ORAL at 08:01

## 2024-01-01 RX ADMIN — PIPERACILLIN SODIUM AND TAZOBACTAM SODIUM 4.5 G: 4; .5 INJECTION, POWDER, FOR SOLUTION INTRAVENOUS at 07:01

## 2024-01-01 RX ADMIN — HYDRALAZINE HYDROCHLORIDE 10 MG: 10 TABLET, FILM COATED ORAL at 03:01

## 2024-01-01 RX ADMIN — LEVOTHYROXINE SODIUM 88 MCG: 88 TABLET ORAL at 08:01

## 2024-01-01 RX ADMIN — LEVOTHYROXINE SODIUM 88 MCG: 88 TABLET ORAL at 06:01

## 2024-01-01 RX ADMIN — HYDROCODONE BITARTRATE AND ACETAMINOPHEN 1 TABLET: 5; 325 TABLET ORAL at 09:01

## 2024-01-01 RX ADMIN — CARVEDILOL 25 MG: 12.5 TABLET, FILM COATED ORAL at 04:01

## 2024-01-01 RX ADMIN — Medication 100 MG: at 08:01

## 2024-01-01 RX ADMIN — MORPHINE SULFATE 4 MG: 4 INJECTION INTRAVENOUS at 07:01

## 2024-01-01 RX ADMIN — MIDAZOLAM HYDROCHLORIDE 1 MG: 1 INJECTION, SOLUTION INTRAMUSCULAR; INTRAVENOUS at 03:01

## 2024-01-01 RX ADMIN — LIDOCAINE HYDROCHLORIDE 5 ML: 10 INJECTION, SOLUTION INFILTRATION; PERINEURAL at 03:01

## 2024-01-01 RX ADMIN — DEXTROSE AND SODIUM CHLORIDE: 5; 900 INJECTION, SOLUTION INTRAVENOUS at 10:01

## 2024-01-01 RX ADMIN — LORAZEPAM 1 MG: 2 INJECTION INTRAMUSCULAR; INTRAVENOUS at 09:01

## 2024-01-01 RX ADMIN — PIPERACILLIN AND TAZOBACTAM 4.5 G: 4; .5 INJECTION, POWDER, LYOPHILIZED, FOR SOLUTION INTRAVENOUS; PARENTERAL at 08:01

## 2024-01-01 RX ADMIN — POTASSIUM CHLORIDE 40 MEQ: 1500 TABLET, EXTENDED RELEASE ORAL at 10:01

## 2024-01-15 PROBLEM — M43.10 ANTEROLISTHESIS: Status: ACTIVE | Noted: 2024-01-01

## 2024-01-22 PROBLEM — R10.9 ABDOMINAL PAIN: Status: ACTIVE | Noted: 2024-01-01

## 2024-01-22 PROBLEM — N18.30 CKD (CHRONIC KIDNEY DISEASE) STAGE 3, GFR 30-59 ML/MIN: Status: ACTIVE | Noted: 2024-01-01

## 2024-01-22 PROBLEM — C79.9 METASTATIC DISEASE: Status: ACTIVE | Noted: 2024-01-01

## 2024-01-22 NOTE — ED PROVIDER NOTES
SCRIBE #1 NOTE: I, Jay Jay Thompson, am scribing for, and in the presence of, Kierra Shin MD. I have scribed the entire note.       History     Chief Complaint   Patient presents with    Abdominal Pain     Abd pain x several days and diarrhea x 3 weeks     Review of patient's allergies indicates:   Allergen Reactions    Aspirin     Ditropan [oxybutynin chloride]      itching    Iodine and iodide containing products     Iodinated contrast media Other (See Comments)     Unknown reaction per patient         History of Present Illness     HPI    1/22/2024, 5:58 PM  History obtained from the patient and pt's son      History of Present Illness: Tamy Allan is a 78 y.o. female patient with a PMHx of DM II, HLD, amblyopia, a cataract, HTN, arthritis, an encounter for a blood transfusion, hyperthyroidism, cardiomegaly, and a vitamin D deficiency who presents to the Emergency Department for evaluation of LLQ abd pain which radiates to her lower back and onset gradually 3 weeks PTA. Symptoms are constant, and pt rates the pain a 10/10. No mitigating or exacerbating factors reported. Associated sxs include diarrhea, vomiting, and dysuria. Patient denies any fever, nausea, and all other sxs at this time. Pt reports she was recently dx with a UTI but did not finish her medicine. Pt reports she takes all her other medications regularly. Pt's son reports pt has been confused and has not been moving around for the past week. No further complaints or concerns at this time.       Arrival mode: Personal vehicle    PCP: Irma Ro MD        Past Medical History:  Past Medical History:   Diagnosis Date    Acquired hypothyroidism     Amblyopia     OD    Arthritis     Benign hypertension     Cardiomegaly     Cataract     Diabetes mellitus, type 2     Encounter for blood transfusion     Encounter for general adult medical examination without abnormal findings 08/31/2016    Hyperlipidemia     Hyperlipidemia      Hypertension     Refractive error     Type 2 diabetes mellitus     Vitamin D deficiency        Past Surgical History:  Past Surgical History:   Procedure Laterality Date    CARDIAC CATHETERIZATION      with stents    HYSTERECTOMY      TOTAL KNEE ARTHROPLASTY Left          Family History:  Family History   Problem Relation Age of Onset    Heart disease Mother     Hypertension Mother     Heart disease Father     Hypertension Father     Heart disease Sister     Hypertension Sister     Heart disease Brother     Hypertension Brother        Social History:  Social History     Tobacco Use    Smoking status: Never    Smokeless tobacco: Never   Substance and Sexual Activity    Alcohol use: No    Drug use: No    Sexual activity: Not Currently        Review of Systems     Review of Systems   Constitutional:  Negative for fever.   HENT:  Negative for sore throat.    Respiratory:  Negative for shortness of breath.    Cardiovascular:  Negative for chest pain.   Gastrointestinal:  Positive for abdominal pain (LLQ radiating to lower back), diarrhea and vomiting. Negative for nausea.   Genitourinary:  Positive for dysuria.   Musculoskeletal:  Negative for back pain.   Skin:  Negative for rash.   Neurological:  Negative for weakness.   Hematological:  Does not bruise/bleed easily.   Psychiatric/Behavioral:  Positive for confusion.       Physical Exam     Initial Vitals   BP Pulse Resp Temp SpO2   01/22/24 1538 01/22/24 1538 01/22/24 1538 01/22/24 1633 01/22/24 1538   131/62 92 18 98.8 °F (37.1 °C) 97 %      MAP       --                 Physical Exam  Nursing Notes and Vital Signs Reviewed.  Constitutional: Patient is in no acute distress. Well-developed and well-nourished.   Head: Atraumatic. Normocephalic.  Eyes: PERRL. EOM intact. Conjunctivae are not pale. No scleral icterus.  ENT: Mucous membranes are moist. Oropharynx is clear and symmetric.    Neck: Supple. Full ROM. No lymphadenopathy.  Cardiovascular: Regular rate. Regular  "rhythm. No murmurs, rubs, or gallops. Distal pulses are 2+ and symmetric.  Pulmonary/Chest: No respiratory distress. Clear to auscultation bilaterally. No wheezing or rales.  Abdominal: Soft and non-distended.  LLQ tenderness.  No rebound, guarding, or rigidity. Good bowel sounds.  Genitourinary: No CVA tenderness.  Musculoskeletal: Moves all extremities. No obvious deformities. No edema. No calf tenderness.  Skin: Warm and dry. Slight jaundice.   Neurological:  Alert, awake, and appropriate.  Normal speech.  No acute focal neurological deficits are appreciated.  Psychiatric: Pt appears confused. Good eye contact. Appropriate in content.     ED Course   Procedures  ED Vital Signs:  Vitals:    01/22/24 1538 01/22/24 1633 01/22/24 1752 01/22/24 1943   BP: 131/62  133/60    Pulse: 92  91    Resp: 18  18 20   Temp:  98.8 °F (37.1 °C)     TempSrc: Oral Oral     SpO2: 97%  96%    Weight: 72.6 kg (160 lb)      Height: 5' 1" (1.549 m)       01/22/24 2057 01/23/24 0116 01/23/24 0655 01/23/24 0730   BP: 124/76 130/65 124/63 (!) 113/56   Pulse: 88 70 68 72   Resp: 20 18 16 17   Temp: 98.9 °F (37.2 °C) 98 °F (36.7 °C) 97.9 °F (36.6 °C)    TempSrc: Oral Oral Oral    SpO2:  98% 100% 98%   Weight:       Height:        01/23/24 0840 01/23/24 0926 01/23/24 1105 01/23/24 1614   BP: 139/63  (!) 110/51 (!) 114/55   Pulse: 82 80 85 88   Resp: (!) 24 20 20 18   Temp: 98.8 °F (37.1 °C)  98.6 °F (37 °C) 98.1 °F (36.7 °C)   TempSrc: Oral  Oral Tympanic   SpO2: 96% 97% 96% (!) 92%   Weight:       Height:           Abnormal Lab Results:  Labs Reviewed   CBC W/ AUTO DIFFERENTIAL - Abnormal; Notable for the following components:       Result Value    WBC 21.73 (*)     RBC 3.63 (*)     Hemoglobin 9.7 (*)     Hematocrit 30.6 (*)     MCH 26.7 (*)     MCHC 31.7 (*)     Immature Granulocytes 0.9 (*)     Gran # (ANC) 17.6 (*)     Immature Grans (Abs) 0.20 (*)     Mono # 2.5 (*)     Gran % 80.8 (*)     Lymph % 5.7 (*)     All other components within " normal limits   COMPREHENSIVE METABOLIC PANEL - Abnormal; Notable for the following components:    Glucose 145 (*)     BUN 32 (*)     Albumin 2.0 (*)     Alkaline Phosphatase 347 (*)     AST 52 (*)     eGFR 39 (*)     All other components within normal limits   URINALYSIS, REFLEX TO URINE CULTURE - Abnormal; Notable for the following components:    Appearance, UA Hazy (*)     Protein, UA 1+ (*)     All other components within normal limits    Narrative:     Specimen Source->Urine   COMPREHENSIVE METABOLIC PANEL - Abnormal; Notable for the following components:    Chloride 113 (*)     BUN 33 (*)     Calcium 8.3 (*)     Total Protein 5.6 (*)     Albumin 1.8 (*)     Alkaline Phosphatase 318 (*)     AST 42 (*)     eGFR 39 (*)     Anion Gap 5 (*)     All other components within normal limits   CBC W/ AUTO DIFFERENTIAL - Abnormal; Notable for the following components:    WBC 19.46 (*)     RBC 3.25 (*)     Hemoglobin 8.8 (*)     Hematocrit 27.9 (*)     MCHC 31.5 (*)     Immature Granulocytes 1.0 (*)     Gran # (ANC) 14.9 (*)     Immature Grans (Abs) 0.19 (*)     Mono # 2.4 (*)     Gran % 76.4 (*)     Lymph % 8.4 (*)     All other components within normal limits   CULTURE, BLOOD   CULTURE, BLOOD   LIPASE   LACTIC ACID, PLASMA   URINALYSIS MICROSCOPIC    Narrative:     Specimen Source->Urine   POCT GLUCOSE   POCT GLUCOSE MONITORING CONTINUOUS   POCT RAPID INFLUENZA A/B        All Lab Results:  Results for orders placed or performed during the hospital encounter of 01/22/24   Blood Culture #1 **CANNOT BE ORDERED STAT**    Specimen: Peripheral, Hand, Left; Blood   Result Value Ref Range    Blood Culture, Routine No Growth to date    Blood Culture #1 **CANNOT BE ORDERED STAT**    Specimen: Peripheral, Antecubital, Left; Blood   Result Value Ref Range    Blood Culture, Routine No Growth to date    CBC auto differential   Result Value Ref Range    WBC 21.73 (H) 3.90 - 12.70 K/uL    RBC 3.63 (L) 4.00 - 5.40 M/uL    Hemoglobin 9.7  (L) 12.0 - 16.0 g/dL    Hematocrit 30.6 (L) 37.0 - 48.5 %    MCV 84 82 - 98 fL    MCH 26.7 (L) 27.0 - 31.0 pg    MCHC 31.7 (L) 32.0 - 36.0 g/dL    RDW 14.1 11.5 - 14.5 %    Platelets 438 150 - 450 K/uL    MPV 9.6 9.2 - 12.9 fL    Immature Granulocytes 0.9 (H) 0.0 - 0.5 %    Gran # (ANC) 17.6 (H) 1.8 - 7.7 K/uL    Immature Grans (Abs) 0.20 (H) 0.00 - 0.04 K/uL    Lymph # 1.2 1.0 - 4.8 K/uL    Mono # 2.5 (H) 0.3 - 1.0 K/uL    Eos # 0.2 0.0 - 0.5 K/uL    Baso # 0.06 0.00 - 0.20 K/uL    nRBC 0 0 /100 WBC    Gran % 80.8 (H) 38.0 - 73.0 %    Lymph % 5.7 (L) 18.0 - 48.0 %    Mono % 11.5 4.0 - 15.0 %    Eosinophil % 0.8 0.0 - 8.0 %    Basophil % 0.3 0.0 - 1.9 %    Differential Method Automated    Comprehensive metabolic panel   Result Value Ref Range    Sodium 142 136 - 145 mmol/L    Potassium 3.6 3.5 - 5.1 mmol/L    Chloride 109 95 - 110 mmol/L    CO2 25 23 - 29 mmol/L    Glucose 145 (H) 70 - 110 mg/dL    BUN 32 (H) 8 - 23 mg/dL    Creatinine 1.4 0.5 - 1.4 mg/dL    Calcium 8.8 8.7 - 10.5 mg/dL    Total Protein 6.2 6.0 - 8.4 g/dL    Albumin 2.0 (L) 3.5 - 5.2 g/dL    Total Bilirubin 0.6 0.1 - 1.0 mg/dL    Alkaline Phosphatase 347 (H) 55 - 135 U/L    AST 52 (H) 10 - 40 U/L    ALT 14 10 - 44 U/L    eGFR 39 (A) >60 mL/min/1.73 m^2    Anion Gap 8 8 - 16 mmol/L   Lipase   Result Value Ref Range    Lipase 15 4 - 60 U/L   Urinalysis, Reflex to Urine Culture Urine, Catheterized    Specimen: Urine   Result Value Ref Range    Specimen UA Urine, Catheterized     Color, UA Yellow Yellow, Straw, Cecilia    Appearance, UA Hazy (A) Clear    pH, UA 6.0 5.0 - 8.0    Specific Gravity, UA 1.020 1.005 - 1.030    Protein, UA 1+ (A) Negative    Glucose, UA Negative Negative    Ketones, UA Negative Negative    Bilirubin (UA) Negative Negative    Occult Blood UA Negative Negative    Nitrite, UA Negative Negative    Urobilinogen, UA Negative <2.0 EU/dL    Leukocytes, UA Negative Negative   Lactic acid, plasma   Result Value Ref Range    Lactate  (Lactic Acid) 0.6 0.5 - 2.2 mmol/L   CEA   Result Value Ref Range    CEA 2.0 0.0 - 5.0 ng/mL      Result Value Ref Range     162 (H) 0 - 30 U/mL   AFP tumor marker   Result Value Ref Range    AFP 11 (H) 0.0 - 8.4 ng/mL   Urinalysis Microscopic   Result Value Ref Range    RBC, UA 2 0 - 4 /hpf    WBC, UA 5 0 - 5 /hpf    Bacteria Rare None-Occ /hpf    Hyaline Casts, UA 1 0-1/lpf /lpf    Uric Acid Natalie, UA Few None-Moderate    Microscopic Comment SEE COMMENT    Comprehensive Metabolic Panel (CMP)   Result Value Ref Range    Sodium 144 136 - 145 mmol/L    Potassium 3.7 3.5 - 5.1 mmol/L    Chloride 113 (H) 95 - 110 mmol/L    CO2 26 23 - 29 mmol/L    Glucose 92 70 - 110 mg/dL    BUN 33 (H) 8 - 23 mg/dL    Creatinine 1.4 0.5 - 1.4 mg/dL    Calcium 8.3 (L) 8.7 - 10.5 mg/dL    Total Protein 5.6 (L) 6.0 - 8.4 g/dL    Albumin 1.8 (L) 3.5 - 5.2 g/dL    Total Bilirubin 0.5 0.1 - 1.0 mg/dL    Alkaline Phosphatase 318 (H) 55 - 135 U/L    AST 42 (H) 10 - 40 U/L    ALT 13 10 - 44 U/L    eGFR 39 (A) >60 mL/min/1.73 m^2    Anion Gap 5 (L) 8 - 16 mmol/L   CBC with Automated Differential   Result Value Ref Range    WBC 19.46 (H) 3.90 - 12.70 K/uL    RBC 3.25 (L) 4.00 - 5.40 M/uL    Hemoglobin 8.8 (L) 12.0 - 16.0 g/dL    Hematocrit 27.9 (L) 37.0 - 48.5 %    MCV 86 82 - 98 fL    MCH 27.1 27.0 - 31.0 pg    MCHC 31.5 (L) 32.0 - 36.0 g/dL    RDW 14.3 11.5 - 14.5 %    Platelets 402 150 - 450 K/uL    MPV 9.7 9.2 - 12.9 fL    Immature Granulocytes 1.0 (H) 0.0 - 0.5 %    Gran # (ANC) 14.9 (H) 1.8 - 7.7 K/uL    Immature Grans (Abs) 0.19 (H) 0.00 - 0.04 K/uL    Lymph # 1.6 1.0 - 4.8 K/uL    Mono # 2.4 (H) 0.3 - 1.0 K/uL    Eos # 0.3 0.0 - 0.5 K/uL    Baso # 0.06 0.00 - 0.20 K/uL    nRBC 0 0 /100 WBC    Gran % 76.4 (H) 38.0 - 73.0 %    Lymph % 8.4 (L) 18.0 - 48.0 %    Mono % 12.5 4.0 - 15.0 %    Eosinophil % 1.4 0.0 - 8.0 %    Basophil % 0.3 0.0 - 1.9 %    Differential Method Automated    COVID-19 Rapid Screening   Result Value Ref Range     SARS-CoV-2 RNA, Amplification, Qual Negative Negative   POCT glucose   Result Value Ref Range    POCT Glucose 91 70 - 110 mg/dL   POCT glucose   Result Value Ref Range    POCT Glucose 97 70 - 110 mg/dL         Imaging Results:  Imaging Results              X-Ray Lumbar Spine Ap And Lateral (Final result)  Result time 01/23/24 02:05:07      Final result by Lisbeth Soria MD (01/23/24 02:05:07)                   Impression:      No acute fracture.      Electronically signed by: Lisbeth Soria  Date:    01/23/2024  Time:    02:05               Narrative:    EXAMINATION:  XR LUMBAR SPINE AP AND LATERAL    CLINICAL HISTORY:  lumbar back pain;    TECHNIQUE:  AP, lateral and spot images were performed of the lumbar spine.    COMPARISON:  None    FINDINGS:  No acute fracture.  Minimal anterolisthesis at L4-5.  Moderate to advanced diffuse multilevel discogenic disease.  Advanced lower lumbar facet arthrosis.                                        CT Chest Without Contrast (Final result)  Result time 01/22/24 20:31:26      Final result by Ayush Moss MD (01/22/24 20:31:26)                   Impression:      Innumerable pulmonary nodules primarily concerning for metastatic disease.  Evaluation of number and size is limited by motion.    Small bilateral pleural effusions.    Trace pericardial effusion.    Complete findings as above.    This report was flagged in Epic as abnormal.    All CT scans at this facility are performed  using dose modulation techniques as appropriate to performed exam including the following:  automated exposure control; adjustment of mA and/or kV according to the patients size (this includes techniques or standardized protocols for targeted exams where dose is matched to indication/reason for exam: i.e. extremities or head);  iterative reconstruction technique.      Electronically signed by: Ayush Moss  Date:    01/22/2024  Time:    20:31               Narrative:     EXAMINATION:  CT CHEST WITHOUT CONTRAST    CLINICAL HISTORY:  Lymphadenopathy, chest or axilla;    TECHNIQUE:  Low dose axial images, sagittal and coronal reformations were obtained from the thoracic inlet to the lung bases. Contrast was not administered.    COMPARISON:  None    FINDINGS:  Base of Neck: No significant abnormality.    Thoracic soft tissues: Normal.    Aorta: Left-sided aortic arch.  No aneurysm and no significant atherosclerosis    Heart: Trace pericardial effusion.  Normal size.    Pulmonary vasculature: Pulmonary arteries distribute normally.  There are four pulmonary veins.    Rylee/Mediastinum: No pathologic mauri enlargement.    Airways: Patent.    Lungs/Pleura: Small bilateral pleural effusions.  Innumerable small nodular opacities throughout the lungs.  Motion severely degrades fine parenchymal evaluation and accurate determination of number and size of nodules.  These would be presumed neoplastic and metastatic until proven otherwise.    Esophagus: Normal.    Bones: No acute fracture. No suspicious lytic or sclerotic lesions.  Osseous degenerative changes.                                        CT Abdomen Pelvis  Without Contrast (Final result)  Result time 01/22/24 18:34:22      Final result by Ayush Moss MD (01/22/24 18:34:22)                   Impression:      Innumerable hepatic lesions consistent with metastatic disease.  Additional retroperitoneal adenopathy.    At least small bilateral pleural effusions. Increased interstitial attenuation. Innumerable nodular pulmonary opacities in the lung bases favored related to neoplasm.    Small volume abdominopelvic ascites.    Complete findings as above.    This report was flagged in Epic as abnormal.    All CT scans at this facility are performed  using dose modulation techniques as appropriate to performed exam including the following:  automated exposure control; adjustment of mA and/or kV according to the patients size (this includes  techniques or standardized protocols for targeted exams where dose is matched to indication/reason for exam: i.e. extremities or head);  iterative reconstruction technique.      Electronically signed by: Ayush Moss  Date:    01/22/2024  Time:    18:34               Narrative:    EXAMINATION:  CT ABDOMEN PELVIS WITHOUT CONTRAST    CLINICAL HISTORY:  Abdominal pain, acute, nonlocalized;    TECHNIQUE:  Low dose axial images, sagittal and coronal reformations were obtained from the lung bases to the pubic symphysis.  Contrast was not administered.    COMPARISON:  None    FINDINGS:  Heart: Normal in size. No pericardial effusion.    Lung Bases: At least small bilateral pleural effusions.  Increased interstitial attenuation.  Innumerable nodular pulmonary opacities in the lung bases favored related to neoplasm    Liver: Multiple hepatic hypodensities most concerning for neoplasm.    Gallbladder: Irregularity along the gallbladder lumen possibly stones.    Bile Ducts: No evidence of dilated ducts.    Pancreas: No mass or peripancreatic fat stranding.    Spleen: Unremarkable.    Adrenals: Unremarkable.    Kidneys/ Ureters: Multiple renal hypodensities not all fully characterized but favoring simple cysts.  No hydronephrosis.  Nonobstructive left nephrolithiasis.    Bladder: No evidence of wall thickening.    Reproductive organs: Uterus is surgically absent.    GI Tract/Mesentery: Diverticulosis without diverticulitis.  No evidence of appendicitis.    Peritoneal Space: Small volume abdominopelvic ascites.  No free air.    Retroperitoneum: Retroperitoneal adenopathy.    Abdominal wall: Unremarkable.    Vasculature: No significant atherosclerosis or aneurysm.    Bones: No acute fracture.  Osseous degenerative changes.                                              The Emergency Provider reviewed the vital signs and test results, which are outlined above.     ED Discussion       6:57 PM: Consulted Dr. Cesario Curran ([facility]) regarding pt's case.     8:23 PM: Discussed case with Dr. Curran ([facility]). Dr. Curran agrees with current care and management of pt and accepts admission.   Admitting Service: Hospital Medicine  Admitting Physician: Dr. Curran  Admit to: Inpatient Med/Surg    8:23 PM: Re-evaluated pt. I have discussed test results, shared treatment plan, and the need for admission with patient and family at bedside. Pt and family express understanding at this time and agree with all information. All questions answered. Pt and family have no further questions or concerns at this time. Pt is ready for admit.         Medical Decision Making  Amount and/or Complexity of Data Reviewed  Labs: ordered. Decision-making details documented in ED Course.  Radiology: ordered. Decision-making details documented in ED Course.    Risk  Prescription drug management.  Decision regarding hospitalization.                ED Medication(s):  Medications   sodium chloride 0.9% flush 10 mL (has no administration in time range)   albuterol-ipratropium 2.5 mg-0.5 mg/3 mL nebulizer solution 3 mL (has no administration in time range)   melatonin tablet 6 mg (has no administration in time range)   ondansetron injection 4 mg (has no administration in time range)   promethazine tablet 25 mg (has no administration in time range)   senna-docusate 8.6-50 mg per tablet 1 tablet (has no administration in time range)   acetaminophen tablet 650 mg (has no administration in time range)   aluminum-magnesium hydroxide-simethicone 200-200-20 mg/5 mL suspension 30 mL (has no administration in time range)   acetaminophen suppository 650 mg (has no administration in time range)   HYDROcodone-acetaminophen 5-325 mg per tablet 1 tablet (has no administration in time range)   morphine injection 2 mg (has no administration in time range)   naloxone 0.4 mg/mL injection 0.02 mg (has no administration in time range)   glucose chewable tablet 16 g (has no  administration in time range)   glucose chewable tablet 24 g (has no administration in time range)   glucagon (human recombinant) injection 1 mg (has no administration in time range)   insulin aspart U-100 pen 0-5 Units (has no administration in time range)   dextrose 10% bolus 125 mL 125 mL (has no administration in time range)   dextrose 10% bolus 250 mL 250 mL (has no administration in time range)   piperacillin-tazobactam (ZOSYN) 4.5 g in dextrose 5 % in water (D5W) 100 mL IVPB (MB+) (0 g Intravenous Stopped 1/23/24 1533)   amLODIPine tablet 5 mg (0 mg Oral Hold 1/23/24 0900)   atorvastatin tablet 40 mg (40 mg Oral Given 1/23/24 1354)   carvediloL tablet 25 mg (0 mg Oral Hold 1/23/24 0745)   hydrALAZINE tablet 10 mg (0 mg Oral Hold 1/23/24 0900)   levothyroxine tablet 88 mcg (88 mcg Oral Given 1/23/24 0845)   losartan tablet 100 mg (0 mg Oral Hold 1/23/24 0900)   sodium chloride 0.9% bolus 1,000 mL 1,000 mL (0 mLs Intravenous Stopped 1/22/24 1752)   ketorolac injection 15 mg (15 mg Intravenous Given 1/22/24 1751)   morphine injection 4 mg (4 mg Intravenous Given 1/22/24 1943)   piperacillin-tazobactam (ZOSYN) 4.5 g in dextrose 5 % in water (D5W) 100 mL IVPB (MB+) (0 g Intravenous Stopped 1/22/24 2015)   ondansetron injection 4 mg (4 mg Intravenous Given 1/22/24 1940)       Current Discharge Medication List                  Scribe Attestation:   Scribe #1: I performed the above scribed service and the documentation accurately describes the services I performed. I attest to the accuracy of the note.     Attending:   Physician Attestation Statement for Scribe #1: I, Kierra Shin MD, personally performed the services described in this documentation, as scribed by Jay Jay Thompson, in my presence, and it is both accurate and complete.           Clinical Impression       ICD-10-CM ICD-9-CM   1. Metastatic disease  C79.9 199.1   2. Chest pain  R07.9 786.50       Disposition:   Disposition: Admitted  Condition:  Serious         Kierra Shin MD  01/23/24 0478

## 2024-01-22 NOTE — FIRST PROVIDER EVALUATION
Medical screening examination initiated.  I have conducted a focused provider triage encounter, findings are as follows:    Brief history of present illness:  Patient presents with generalized abdominal pain, nausea and vomiting.    There were no vitals filed for this visit.    Pertinent physical exam:      Brief workup plan:  Labs    Preliminary workup initiated; this workup will be continued and followed by the physician or advanced practice provider that is assigned to the patient when roomed.

## 2024-01-23 NOTE — ASSESSMENT & PLAN NOTE
Patient's anemia is currently  lower than last reported baseline of 11.4/35.4 . Has not received any PRBCs to date. Etiology likely d/t Iron deficiency and chronic disease due to Chronic Kidney Disease/ESRD in addition to underlying malignancy.   Current CBC reviewed-   Lab Results   Component Value Date    HGB 9.7 (L) 01/22/2024    HCT 30.6 (L) 01/22/2024     Monitor serial CBC and transfuse if patient becomes hemodynamically unstable, symptomatic or H/H drops below 7/21.

## 2024-01-23 NOTE — SUBJECTIVE & OBJECTIVE
Review of Systems  Objective:     Vital Signs (Most Recent):  Temp: 98.6 °F (37 °C) (01/23/24 1105)  Pulse: 85 (01/23/24 1105)  Resp: 20 (01/23/24 1105)  BP: (!) 110/51 (01/23/24 1105)  SpO2: 96 % (01/23/24 1105) Vital Signs (24h Range):  Temp:  [97.9 °F (36.6 °C)-98.9 °F (37.2 °C)] 98.6 °F (37 °C)  Pulse:  [68-91] 85  Resp:  [16-24] 20  SpO2:  [96 %-100 %] 96 %  BP: (110-139)/(51-76) 110/51     Weight: 72.6 kg (160 lb)  Body mass index is 30.23 kg/m².    Intake/Output Summary (Last 24 hours) at 1/23/2024 1605  Last data filed at 1/23/2024 0732  Gross per 24 hour   Intake 1200 ml   Output --   Net 1200 ml         Physical Exam      Constitutional:       General: She is not in acute distress.     Appearance: Normal appearance. She is normal weight. She is not ill-appearing, toxic-appearing or diaphoretic.   HENT:      Head: Normocephalic and atraumatic.      Right Ear: External ear normal.      Left Ear: External ear normal.      Nose: Nose normal. No congestion or rhinorrhea.      Mouth/Throat:      Mouth: Mucous membranes are moist.      Pharynx: Oropharynx is clear. No oropharyngeal exudate or posterior oropharyngeal erythema.   Eyes:      General: No scleral icterus.     Extraocular Movements: Extraocular movements intact.      Conjunctiva/sclera: Conjunctivae normal.      Pupils: Pupils are equal, round, and reactive to light.   Neck:      Vascular: No carotid bruit.   Cardiovascular:      Rate and Rhythm: Normal rate and regular rhythm.      Pulses: Normal pulses.      Heart sounds: Normal heart sounds. No murmur heard.     No friction rub. No gallop.   Pulmonary:      Effort: Pulmonary effort is normal. No respiratory distress.      Breath sounds: Normal breath sounds. No stridor. No wheezing, rhonchi or rales.   Chest:      Chest wall: No tenderness.   Abdominal:      General: Abdomen is flat. Bowel sounds are normal. There is no distension.      Palpations: Abdomen is soft.      Tenderness: There is  abdominal tenderness. There is no right CVA tenderness, left CVA tenderness, guarding or rebound.      Hernia: No hernia is present.      Comments: Mild TTP throughout without evidence of guarding or rebound tenderness noted   Musculoskeletal:         General: No swelling, tenderness, deformity or signs of injury. Normal range of motion.      Cervical back: Normal range of motion and neck supple. No rigidity or tenderness.      Right lower leg: No edema.      Left lower leg: No edema.   Lymphadenopathy:      Cervical: No cervical adenopathy.   Skin:     General: Skin is warm and dry.      Capillary Refill: Capillary refill takes less than 2 seconds.      Coloration: Skin is not jaundiced or pale.      Findings: No bruising, erythema, lesion or rash.   Neurological:      General: No focal deficit present.      Mental Status: She is alert and oriented to person, place, and time. Mental status is at baseline.      Cranial Nerves: No cranial nerve deficit.      Sensory: No sensory deficit.      Motor: No weakness.      Coordination: Coordination normal.   Psychiatric:         Mood and Affect: Mood normal.         Behavior: Behavior normal.         Thought Content: Thought content normal.         Judgment: Judgment normal.         Significant Labs: All pertinent labs within the past 24 hours have been reviewed.  CBC:   Recent Labs   Lab 01/22/24  1605 01/23/24  0430   WBC 21.73* 19.46*   HGB 9.7* 8.8*   HCT 30.6* 27.9*    402     CMP:   Recent Labs   Lab 01/22/24  1605 01/23/24  0430    144   K 3.6 3.7    113*   CO2 25 26   * 92   BUN 32* 33*   CREATININE 1.4 1.4   CALCIUM 8.8 8.3*   PROT 6.2 5.6*   ALBUMIN 2.0* 1.8*   BILITOT 0.6 0.5   ALKPHOS 347* 318*   AST 52* 42*   ALT 14 13   ANIONGAP 8 5*       Significant Imaging:     Imaging Results              X-Ray Lumbar Spine Ap And Lateral (Final result)  Result time 01/23/24 02:05:07      Final result by Lisbeth Soria MD (01/23/24  02:05:07)                   Impression:      No acute fracture.      Electronically signed by: Lisbeth Soria  Date:    01/23/2024  Time:    02:05               Narrative:    EXAMINATION:  XR LUMBAR SPINE AP AND LATERAL    CLINICAL HISTORY:  lumbar back pain;    TECHNIQUE:  AP, lateral and spot images were performed of the lumbar spine.    COMPARISON:  None    FINDINGS:  No acute fracture.  Minimal anterolisthesis at L4-5.  Moderate to advanced diffuse multilevel discogenic disease.  Advanced lower lumbar facet arthrosis.                                        CT Chest Without Contrast (Final result)  Result time 01/22/24 20:31:26      Final result by Ayush Moss MD (01/22/24 20:31:26)                   Impression:      Innumerable pulmonary nodules primarily concerning for metastatic disease.  Evaluation of number and size is limited by motion.    Small bilateral pleural effusions.    Trace pericardial effusion.    Complete findings as above.    This report was flagged in Epic as abnormal.    All CT scans at this facility are performed  using dose modulation techniques as appropriate to performed exam including the following:  automated exposure control; adjustment of mA and/or kV according to the patients size (this includes techniques or standardized protocols for targeted exams where dose is matched to indication/reason for exam: i.e. extremities or head);  iterative reconstruction technique.      Electronically signed by: Ayush Moss  Date:    01/22/2024  Time:    20:31               Narrative:    EXAMINATION:  CT CHEST WITHOUT CONTRAST    CLINICAL HISTORY:  Lymphadenopathy, chest or axilla;    TECHNIQUE:  Low dose axial images, sagittal and coronal reformations were obtained from the thoracic inlet to the lung bases. Contrast was not administered.    COMPARISON:  None    FINDINGS:  Base of Neck: No significant abnormality.    Thoracic soft tissues: Normal.    Aorta: Left-sided aortic arch.  No  aneurysm and no significant atherosclerosis    Heart: Trace pericardial effusion.  Normal size.    Pulmonary vasculature: Pulmonary arteries distribute normally.  There are four pulmonary veins.    Rylee/Mediastinum: No pathologic mauri enlargement.    Airways: Patent.    Lungs/Pleura: Small bilateral pleural effusions.  Innumerable small nodular opacities throughout the lungs.  Motion severely degrades fine parenchymal evaluation and accurate determination of number and size of nodules.  These would be presumed neoplastic and metastatic until proven otherwise.    Esophagus: Normal.    Bones: No acute fracture. No suspicious lytic or sclerotic lesions.  Osseous degenerative changes.                                        CT Abdomen Pelvis  Without Contrast (Final result)  Result time 01/22/24 18:34:22      Final result by Ayush Moss MD (01/22/24 18:34:22)                   Impression:      Innumerable hepatic lesions consistent with metastatic disease.  Additional retroperitoneal adenopathy.    At least small bilateral pleural effusions. Increased interstitial attenuation. Innumerable nodular pulmonary opacities in the lung bases favored related to neoplasm.    Small volume abdominopelvic ascites.    Complete findings as above.    This report was flagged in Epic as abnormal.    All CT scans at this facility are performed  using dose modulation techniques as appropriate to performed exam including the following:  automated exposure control; adjustment of mA and/or kV according to the patients size (this includes techniques or standardized protocols for targeted exams where dose is matched to indication/reason for exam: i.e. extremities or head);  iterative reconstruction technique.      Electronically signed by: Ayush Moss  Date:    01/22/2024  Time:    18:34               Narrative:    EXAMINATION:  CT ABDOMEN PELVIS WITHOUT CONTRAST    CLINICAL HISTORY:  Abdominal pain, acute,  nonlocalized;    TECHNIQUE:  Low dose axial images, sagittal and coronal reformations were obtained from the lung bases to the pubic symphysis.  Contrast was not administered.    COMPARISON:  None    FINDINGS:  Heart: Normal in size. No pericardial effusion.    Lung Bases: At least small bilateral pleural effusions.  Increased interstitial attenuation.  Innumerable nodular pulmonary opacities in the lung bases favored related to neoplasm    Liver: Multiple hepatic hypodensities most concerning for neoplasm.    Gallbladder: Irregularity along the gallbladder lumen possibly stones.    Bile Ducts: No evidence of dilated ducts.    Pancreas: No mass or peripancreatic fat stranding.    Spleen: Unremarkable.    Adrenals: Unremarkable.    Kidneys/ Ureters: Multiple renal hypodensities not all fully characterized but favoring simple cysts.  No hydronephrosis.  Nonobstructive left nephrolithiasis.    Bladder: No evidence of wall thickening.    Reproductive organs: Uterus is surgically absent.    GI Tract/Mesentery: Diverticulosis without diverticulitis.  No evidence of appendicitis.    Peritoneal Space: Small volume abdominopelvic ascites.  No free air.    Retroperitoneum: Retroperitoneal adenopathy.    Abdominal wall: Unremarkable.    Vasculature: No significant atherosclerosis or aneurysm.    Bones: No acute fracture.  Osseous degenerative changes.

## 2024-01-23 NOTE — ASSESSMENT & PLAN NOTE
Patient with known CAD which is controlled Will continue ASA, Plavix, and Statin and monitor for S/Sx of angina/ACS. Continue to monitor on telemetry.

## 2024-01-23 NOTE — ASSESSMENT & PLAN NOTE
Pulmonary and liver metastases  Tumor markers sent and pending- would add , CA 15-3  Unclear if health maintenance up to date (mammogram and colonoscopy)  Recommend biopsy - both liver and lung amenable to IR biopsy for tissue diagnosis and treatment discussions    Virtual H+P to follow

## 2024-01-23 NOTE — ASSESSMENT & PLAN NOTE
Chronic, controlled. Latest blood pressure and vitals reviewed-     Temp:  [98.8 °F (37.1 °C)-98.9 °F (37.2 °C)]   Pulse:  [88-92]   Resp:  [18-20]   BP: (124-133)/(60-76)   SpO2:  [96 %-97 %] .   Home meds for hypertension were reviewed and noted below.   Hypertension Medications               amLODIPine (NORVASC) 5 MG tablet TAKE 1 TABLET ONE TIME DAILY    carvediloL (COREG) 25 MG tablet TAKE 1 TABLET TWICE DAILY WITH MEALS    hydrALAZINE (APRESOLINE) 10 MG tablet TAKE 1 TABLET THREE TIMES DAILY    losartan (COZAAR) 100 MG tablet TAKE 1 TABLET ONE TIME DAILY    nitroGLYCERIN (NITROSTAT) 0.3 MG SL tablet           While in the hospital, will manage blood pressure as follows; Continue home antihypertensive regimen    Will utilize p.r.n. blood pressure medication only if patient's blood pressure greater than 160/100 and she develops symptoms such as worsening chest pain or shortness of breath.

## 2024-01-23 NOTE — CONSULTS
[facility] - Emergency Dept.  Hematology/Oncology  Consult Note    Patient Name: Tamy Allan  MRN: [id number]  Admission Date: 1/22/2024  Hospital Length of Stay: 1 days  Code Status: Full Code   Attending Provider: Kyleigh Moore,*  Consulting Provider: Mitra Eduardo MD  Primary Care Physician: Irma Ro MD  Principal Problem:Abdominal pain    Consults  Subjective:     HPI:  Admitted late last night via ED  Presented with several weeks of increasing abdominal pain, decreased oral intake and weight loss, increased confusion    Scans revealed:  - CT Chest:   FINDINGS:  Base of Neck: No significant abnormality.  Thoracic soft tissues: Normal.  Aorta: Left-sided aortic arch.  No aneurysm and no significant atherosclerosis  Heart: Trace pericardial effusion.  Normal size.  Pulmonary vasculature: Pulmonary arteries distribute normally.  There are four pulmonary veins.  Rylee/Mediastinum: No pathologic mauri enlargement.  Airways: Patent.  Lungs/Pleura: Small bilateral pleural effusions.  Innumerable small nodular opacities throughout the lungs.  Motion severely degrades fine parenchymal evaluation and accurate determination of number and size of nodules.  These would be presumed neoplastic and metastatic until proven otherwise.  Esophagus: Normal.  Bones: No acute fracture. No suspicious lytic or sclerotic lesions.  Osseous degenerative changes.  Impression:  Innumerable pulmonary nodules primarily concerning for metastatic disease.  Evaluation of number and size is limited by motion.  Small bilateral pleural effusions.  Trace pericardial effusion.    - CT Abd/Pelvis:  FINDINGS:  Heart: Normal in size. No pericardial effusion.  Lung Bases: At least small bilateral pleural effusions.  Increased interstitial attenuation.  Innumerable nodular pulmonary opacities in the lung bases favored related to neoplasm  Liver: Multiple hepatic hypodensities most concerning for neoplasm.  Gallbladder: Irregularity along  the gallbladder lumen possibly stones.  Bile Ducts: No evidence of dilated ducts.  Pancreas: No mass or peripancreatic fat stranding.  Spleen: Unremarkable.  Adrenals: Unremarkable.  Kidneys/ Ureters: Multiple renal hypodensities not all fully characterized but favoring simple cysts.  No hydronephrosis.  Nonobstructive left nephrolithiasis.  Bladder: No evidence of wall thickening.  Reproductive organs: Uterus is surgically absent.  GI Tract/Mesentery: Diverticulosis without diverticulitis.  No evidence of appendicitis.  Peritoneal Space: Small volume abdominopelvic ascites.  No free air.  Retroperitoneum: Retroperitoneal adenopathy.  Abdominal wall: Unremarkable.  Vasculature: No significant atherosclerosis or aneurysm.  Bones: No acute fracture.  Osseous degenerative changes.  Impression:  Innumerable hepatic lesions consistent with metastatic disease.  Additional retroperitoneal adenopathy.  At least small bilateral pleural effusions. Increased interstitial attenuation. Innumerable nodular pulmonary opacities in the lung bases favored related to neoplasm.  Small volume abdominopelvic ascites.     Labs:  Albumin= 1.8  AST/ALT= 42/13  Alk phos= 318  Tb = 0.5  H/H= 8.8, 27.9  WBC= 19.46  Cr= 1.4      Tamy Allan is a 78 y.o. female with a PMH  has a past medical history of Acquired hypothyroidism, Amblyopia, Arthritis, Benign hypertension, Cardiomegaly, Cataract, Diabetes mellitus, type 2, Encounter for blood transfusion, Encounter for general adult medical examination without abnormal findings (08/31/2016), Hyperlipidemia, Hyperlipidemia, Hypertension, Refractive error, Type 2 diabetes mellitus, and Vitamin D deficiency. who presented to the ED for further evaluation of worsening abdominal pain greatest in her left lower quadrant radiating to her back x3 weeks duration.  Pain is described as sharp/stabbing in nature, constant, radiating to her back and currently rated 4/10 in severity however was 10/10 in  severity at its worst.  She reported no known alleviating factors with aggravating factors including movement and palpation to the affected area.  Associated symptoms included decreased p.o. intake over the past 3 weeks, unknown unintentional weight loss, increased daytime sleepiness, nausea and vomiting, and diarrhea which began earlier today.  She denied endorsing any lightheadedness, dizziness, headache, visual changes, fever, chills, sweats, chest pain, shortness of breath, dysuria, hematuria, melena, hematochezia, or onset neurological deficits.  Patient reported recently being diagnosed with UTI but did not finish her antibiotics. Patient's son also reported she has been confused and not moved around with a past week.  Initial workup in the ED revealed patient had leukocytosis of 21.73, H/H of 9.7/30.6, renal impairment with Cr/GFR 1.4/39, and CT chest/abdomen/pelvis revealing multiple pulmonary nodules as well as multiple hepatic lesions concerning for underlying malignancy/neoplasm.  Lumbar x-ray in setting of lower back pain negative for acute fractures.  Patient denies history of smoking, alcohol abuse, or exposure to chemicals/pesticides however did report strong family history on extended family with her cousins having breast/ cancers. Cancer screening reported to be up-to-date and negative. Patient admitted to Hospital Medicine inpatient for continue malignancy workup while awaiting further evaluation/recommendations from Hematology/Oncology.            No new subjective & objective note has been filed under this hospital service since the last note was generated.    Assessment/Plan:     Metastatic disease  Pulmonary and liver metastases  Tumor markers sent and pending- would add , CA 15-3  Unclear if health maintenance up to date (mammogram and colonoscopy)  Recommend biopsy - both liver and lung amenable to IR biopsy for tissue diagnosis and treatment discussions    Virtual H+P to  follow        Thank you for your consult. I will follow-up with patient. Please contact us if you have any additional questions.    Mitra Eduardo MD  Hematology/Oncology  [facility] - Emergency Dept.

## 2024-01-23 NOTE — PROGRESS NOTES
[facility] Medicine  Progress Note    Patient Name: Tamy Allan  MRN: [id number]  Patient Class: IP- Inpatient   Admission Date: 1/22/2024  Length of Stay: 1 days  Attending Physician: Kyleigh Moore,*  Primary Care Provider: Irma Ro MD        Subjective:     Principal Problem:Abdominal pain        HPI:  Tamy Allan is a 78 y.o. female with a PMH  has a past medical history of Acquired hypothyroidism, Amblyopia, Arthritis, Benign hypertension, Cardiomegaly, Cataract, Diabetes mellitus, type 2, Encounter for blood transfusion, Encounter for general adult medical examination without abnormal findings (08/31/2016), Hyperlipidemia, Hyperlipidemia, Hypertension, Refractive error, Type 2 diabetes mellitus, and Vitamin D deficiency. who presented to the ED for further evaluation of worsening abdominal pain greatest in her left lower quadrant radiating to her back x3 weeks duration.  Pain is described as sharp/stabbing in nature, constant, radiating to her back and currently rated 4/10 in severity however was 10/10 in severity at its worst.  She reported no known alleviating factors with aggravating factors including movement and palpation to the affected area.  Associated symptoms included decreased p.o. intake over the past 3 weeks, unknown unintentional weight loss, increased daytime sleepiness, nausea and vomiting, and diarrhea which began earlier today.  She denied endorsing any lightheadedness, dizziness, headache, visual changes, fever, chills, sweats, chest pain, shortness of breath, dysuria, hematuria, melena, hematochezia, or onset neurological deficits.  Patient reported recently being diagnosed with UTI but did not finish her antibiotics. Patient's son also reported she has been confused and not moved around with a past week.  Initial workup in the ED revealed patient had leukocytosis of 21.73, H/H of 9.7/30.6, renal impairment with Cr/GFR 1.4/39, and CT  chest/abdomen/pelvis revealing multiple pulmonary nodules as well as multiple hepatic lesions concerning for underlying malignancy/neoplasm.  Lumbar x-ray in setting of lower back pain negative for acute fractures.  Patient denies history of smoking, alcohol abuse, or exposure to chemicals/pesticides however did report strong family history on extended family with her cousins having breast/ cancers. Cancer screening reported to be up-to-date and negative. Patient admitted to Hospital Medicine inpatient for continue malignancy workup while awaiting further evaluation/recommendations from Hematology/Oncology.       PCP: Irma Ro      Overview/Hospital Course:  Tamy Allan is a 78 y.o. female with a PMH  has a past medical history of Acquired hypothyroidism, Amblyopia, Arthritis, Benign hypertension, Cardiomegaly, Cataract, Diabetes mellitus, type 2, Encounter for blood transfusion, Encounter for general adult medical examination without abnormal findings (08/31/2016), Hyperlipidemia, Hyperlipidemia, Hypertension, Refractive error, Type 2 diabetes mellitus, and Vitamin D deficiency. who presented to the ED for further evaluation of worsening abdominal pain greatest in her left lower quadrant radiating to her back x3 weeks duration.    Associated symptoms included decreased p.o. intake over the past 3 weeks, unknown unintentional weight loss, increased daytime sleepiness, nausea and vomiting, and diarrhea which began earlier today.  Patient reported recently being diagnosed with UTI but did not finish her antibiotics. Patient's son also reported she has been confused and not moved around with a past week.  CT chest/abdomen/pelvis revealing multiple pulmonary nodules as well as multiple hepatic lesions concerning for underlying malignancy/neoplasm.  Lumbar x-ray in setting of lower back pain negative for acute fractures.  Patient denies history of smoking, alcohol abuse, or exposure to  chemicals/pesticides however did report strong family history on extended family with her cousins having breast/ cancers. Cancer screening reported to be up-to-date and negative. Patient admitted to Hospital Medicine inpatient for continue malignancy workup with Heme-Onc follow up   Heme-Onc evaluated and recommended IR guided biopsy.  Appreciate Heme-Onc inputs;  Discussed with intervention radiologist Dr. Gaytan- recommended to continue to hold aspirin, Lovenox/blood thinners, plans for possible IR guided biopsy on Thursday, 01/25/2024,;         Review of Systems  Objective:     Vital Signs (Most Recent):  Temp: 98.6 °F (37 °C) (01/23/24 1105)  Pulse: 85 (01/23/24 1105)  Resp: 20 (01/23/24 1105)  BP: (!) 110/51 (01/23/24 1105)  SpO2: 96 % (01/23/24 1105) Vital Signs (24h Range):  Temp:  [97.9 °F (36.6 °C)-98.9 °F (37.2 °C)] 98.6 °F (37 °C)  Pulse:  [68-91] 85  Resp:  [16-24] 20  SpO2:  [96 %-100 %] 96 %  BP: (110-139)/(51-76) 110/51     Weight: 72.6 kg (160 lb)  Body mass index is 30.23 kg/m².    Intake/Output Summary (Last 24 hours) at 1/23/2024 1605  Last data filed at 1/23/2024 0732  Gross per 24 hour   Intake 1200 ml   Output --   Net 1200 ml         Physical Exam      Constitutional:       General: She is not in acute distress.     Appearance: Normal appearance. She is normal weight. She is not ill-appearing, toxic-appearing or diaphoretic.   HENT:      Head: Normocephalic and atraumatic.      Right Ear: External ear normal.      Left Ear: External ear normal.      Nose: Nose normal. No congestion or rhinorrhea.      Mouth/Throat:      Mouth: Mucous membranes are moist.      Pharynx: Oropharynx is clear. No oropharyngeal exudate or posterior oropharyngeal erythema.   Eyes:      General: No scleral icterus.     Extraocular Movements: Extraocular movements intact.      Conjunctiva/sclera: Conjunctivae normal.      Pupils: Pupils are equal, round, and reactive to light.   Neck:      Vascular: No carotid  bruit.   Cardiovascular:      Rate and Rhythm: Normal rate and regular rhythm.      Pulses: Normal pulses.      Heart sounds: Normal heart sounds. No murmur heard.     No friction rub. No gallop.   Pulmonary:      Effort: Pulmonary effort is normal. No respiratory distress.      Breath sounds: Normal breath sounds. No stridor. No wheezing, rhonchi or rales.   Chest:      Chest wall: No tenderness.   Abdominal:      General: Abdomen is flat. Bowel sounds are normal. There is no distension.      Palpations: Abdomen is soft.      Tenderness: There is abdominal tenderness. There is no right CVA tenderness, left CVA tenderness, guarding or rebound.      Hernia: No hernia is present.      Comments: Mild TTP throughout without evidence of guarding or rebound tenderness noted   Musculoskeletal:         General: No swelling, tenderness, deformity or signs of injury. Normal range of motion.      Cervical back: Normal range of motion and neck supple. No rigidity or tenderness.      Right lower leg: No edema.      Left lower leg: No edema.   Lymphadenopathy:      Cervical: No cervical adenopathy.   Skin:     General: Skin is warm and dry.      Capillary Refill: Capillary refill takes less than 2 seconds.      Coloration: Skin is not jaundiced or pale.      Findings: No bruising, erythema, lesion or rash.   Neurological:      General: No focal deficit present.      Mental Status: She is alert and oriented to person, place, and time. Mental status is at baseline.      Cranial Nerves: No cranial nerve deficit.      Sensory: No sensory deficit.      Motor: No weakness.      Coordination: Coordination normal.   Psychiatric:         Mood and Affect: Mood normal.         Behavior: Behavior normal.         Thought Content: Thought content normal.         Judgment: Judgment normal.         Significant Labs: All pertinent labs within the past 24 hours have been reviewed.  CBC:   Recent Labs   Lab 01/22/24  1605 01/23/24  0430   WBC  21.73* 19.46*   HGB 9.7* 8.8*   HCT 30.6* 27.9*    402     CMP:   Recent Labs   Lab 01/22/24  1605 01/23/24  0430    144   K 3.6 3.7    113*   CO2 25 26   * 92   BUN 32* 33*   CREATININE 1.4 1.4   CALCIUM 8.8 8.3*   PROT 6.2 5.6*   ALBUMIN 2.0* 1.8*   BILITOT 0.6 0.5   ALKPHOS 347* 318*   AST 52* 42*   ALT 14 13   ANIONGAP 8 5*       Significant Imaging:     Imaging Results              X-Ray Lumbar Spine Ap And Lateral (Final result)  Result time 01/23/24 02:05:07      Final result by Lisbeth Soria MD (01/23/24 02:05:07)                   Impression:      No acute fracture.      Electronically signed by: Lisbeth Soria  Date:    01/23/2024  Time:    02:05               Narrative:    EXAMINATION:  XR LUMBAR SPINE AP AND LATERAL    CLINICAL HISTORY:  lumbar back pain;    TECHNIQUE:  AP, lateral and spot images were performed of the lumbar spine.    COMPARISON:  None    FINDINGS:  No acute fracture.  Minimal anterolisthesis at L4-5.  Moderate to advanced diffuse multilevel discogenic disease.  Advanced lower lumbar facet arthrosis.                                        CT Chest Without Contrast (Final result)  Result time 01/22/24 20:31:26      Final result by Ayush Moss MD (01/22/24 20:31:26)                   Impression:      Innumerable pulmonary nodules primarily concerning for metastatic disease.  Evaluation of number and size is limited by motion.    Small bilateral pleural effusions.    Trace pericardial effusion.    Complete findings as above.    This report was flagged in Epic as abnormal.    All CT scans at this facility are performed  using dose modulation techniques as appropriate to performed exam including the following:  automated exposure control; adjustment of mA and/or kV according to the patients size (this includes techniques or standardized protocols for targeted exams where dose is matched to indication/reason for exam: i.e. extremities or head);   iterative reconstruction technique.      Electronically signed by: Ayush Moss  Date:    01/22/2024  Time:    20:31               Narrative:    EXAMINATION:  CT CHEST WITHOUT CONTRAST    CLINICAL HISTORY:  Lymphadenopathy, chest or axilla;    TECHNIQUE:  Low dose axial images, sagittal and coronal reformations were obtained from the thoracic inlet to the lung bases. Contrast was not administered.    COMPARISON:  None    FINDINGS:  Base of Neck: No significant abnormality.    Thoracic soft tissues: Normal.    Aorta: Left-sided aortic arch.  No aneurysm and no significant atherosclerosis    Heart: Trace pericardial effusion.  Normal size.    Pulmonary vasculature: Pulmonary arteries distribute normally.  There are four pulmonary veins.    Rylee/Mediastinum: No pathologic mauri enlargement.    Airways: Patent.    Lungs/Pleura: Small bilateral pleural effusions.  Innumerable small nodular opacities throughout the lungs.  Motion severely degrades fine parenchymal evaluation and accurate determination of number and size of nodules.  These would be presumed neoplastic and metastatic until proven otherwise.    Esophagus: Normal.    Bones: No acute fracture. No suspicious lytic or sclerotic lesions.  Osseous degenerative changes.                                        CT Abdomen Pelvis  Without Contrast (Final result)  Result time 01/22/24 18:34:22      Final result by Ayush Moss MD (01/22/24 18:34:22)                   Impression:      Innumerable hepatic lesions consistent with metastatic disease.  Additional retroperitoneal adenopathy.    At least small bilateral pleural effusions. Increased interstitial attenuation. Innumerable nodular pulmonary opacities in the lung bases favored related to neoplasm.    Small volume abdominopelvic ascites.    Complete findings as above.    This report was flagged in Epic as abnormal.    All CT scans at this facility are performed  using dose modulation techniques as appropriate  to performed exam including the following:  automated exposure control; adjustment of mA and/or kV according to the patients size (this includes techniques or standardized protocols for targeted exams where dose is matched to indication/reason for exam: i.e. extremities or head);  iterative reconstruction technique.      Electronically signed by: Ayush Moss  Date:    01/22/2024  Time:    18:34               Narrative:    EXAMINATION:  CT ABDOMEN PELVIS WITHOUT CONTRAST    CLINICAL HISTORY:  Abdominal pain, acute, nonlocalized;    TECHNIQUE:  Low dose axial images, sagittal and coronal reformations were obtained from the lung bases to the pubic symphysis.  Contrast was not administered.    COMPARISON:  None    FINDINGS:  Heart: Normal in size. No pericardial effusion.    Lung Bases: At least small bilateral pleural effusions.  Increased interstitial attenuation.  Innumerable nodular pulmonary opacities in the lung bases favored related to neoplasm    Liver: Multiple hepatic hypodensities most concerning for neoplasm.    Gallbladder: Irregularity along the gallbladder lumen possibly stones.    Bile Ducts: No evidence of dilated ducts.    Pancreas: No mass or peripancreatic fat stranding.    Spleen: Unremarkable.    Adrenals: Unremarkable.    Kidneys/ Ureters: Multiple renal hypodensities not all fully characterized but favoring simple cysts.  No hydronephrosis.  Nonobstructive left nephrolithiasis.    Bladder: No evidence of wall thickening.    Reproductive organs: Uterus is surgically absent.    GI Tract/Mesentery: Diverticulosis without diverticulitis.  No evidence of appendicitis.    Peritoneal Space: Small volume abdominopelvic ascites.  No free air.    Retroperitoneum: Retroperitoneal adenopathy.    Abdominal wall: Unremarkable.    Vasculature: No significant atherosclerosis or aneurysm.    Bones: No acute fracture.  Osseous degenerative changes.                                       Assessment/Plan:      *  Abdominal pain        CKD (chronic kidney disease) stage 3, GFR 30-59 ml/min  Creatine stable for now. BMP reviewed- noted Estimated Creatinine Clearance: 30.7 mL/min (based on SCr of 1.4 mg/dL). according to latest data. Based on current GFR, CKD stage is stage 3 - GFR 30-59.  Monitor UOP and serial BMP and adjust therapy as needed. Renally dose meds. Avoid nephrotoxic medications and procedures.      Metastatic disease  Patient has evidence of metastatic cancer of unknown primary. The cancer has metastasized to liver and lungs.CT chest/abdomen/pelvis revealed evidence of numerous pulmonary nodules as well as numerous hepatic lesions.  Patient reports being up-to-date on cancer screenings. The patient is not under the care of an outpatient oncologist. The patient is not undergoing active chemotherapy. Their staging information is listed below.   Cancer Staging   No matching staging information was found for the patient.  Plan:  -CEA, AFP,   -f/u hematology/oncology       Anemia in chronic kidney disease  Patient's anemia is currently  lower than last reported baseline of 11.4/35.4 . Has not received any PRBCs to date. Etiology likely d/t Iron deficiency and chronic disease due to Chronic Kidney Disease/ESRD in addition to underlying malignancy.   Current CBC reviewed-   Lab Results   Component Value Date    HGB 9.7 (L) 01/22/2024    HCT 30.6 (L) 01/22/2024     Monitor serial CBC and transfuse if patient becomes hemodynamically unstable, symptomatic or H/H drops below 7/21.      Gout  Not in acute flare.   Plan:  -continue to monitor       Hypothyroidism  Patient has chronic hypothyroidism. TFTs reviewed-   Lab Results   Component Value Date    TSH 0.688 10/03/2023   Plan:  -Will continue chronic levothyroxine and adjust for and clinical changes      Essential hypertension  Chronic, controlled. Latest blood pressure and vitals reviewed-     Temp:  [98.8 °F (37.1 °C)-98.9 °F (37.2 °C)]   Pulse:  [88-92]   Resp:   "[18-20]   BP: (124-133)/(60-76)   SpO2:  [96 %-97 %] .   Home meds for hypertension were reviewed and noted below.   Hypertension Medications               amLODIPine (NORVASC) 5 MG tablet TAKE 1 TABLET ONE TIME DAILY    carvediloL (COREG) 25 MG tablet TAKE 1 TABLET TWICE DAILY WITH MEALS    hydrALAZINE (APRESOLINE) 10 MG tablet TAKE 1 TABLET THREE TIMES DAILY    losartan (COZAAR) 100 MG tablet TAKE 1 TABLET ONE TIME DAILY    nitroGLYCERIN (NITROSTAT) 0.3 MG SL tablet           While in the hospital, will manage blood pressure as follows; Continue home antihypertensive regimen    Will utilize p.r.n. blood pressure medication only if patient's blood pressure greater than 160/100 and she develops symptoms such as worsening chest pain or shortness of breath.      Coronary artery disease involving native coronary artery of native heart without angina pectoris  Patient with known CAD which is controlled Will continue ASA, Plavix, and Statin and monitor for S/Sx of angina/ACS. Continue to monitor on telemetry.       Hyperlipidemia  Patient is chronically on statin.will continue for now. Last Lipid Panel:   Lab Results   Component Value Date    CHOL 111 10/03/2023    HDL 29 (L) 10/03/2023    LDLCALC 54 10/03/2023    TRIG 138 10/03/2023   Plan:  -Continue home medication  -low fat/low calorie diet      Diabetes mellitus  Patient's FSGs are controlled on current medication regimen.  Last A1c reviewed-   Lab Results   Component Value Date    LABA1C 5.9 07/02/2020    HGBA1C 6.3 (H) 01/05/2024     Most recent fingerstick glucose reviewed- No results for input(s): "POCTGLUCOSE" in the last 24 hours.  Current correctional scale  Low  titrate as needed  anti-hyperglycemic dose as follows-   Antihyperglycemics (From admission, onward)      Start     Stop Route Frequency Ordered    01/22/24 2126  insulin aspart U-100 pen 0-5 Units         -- SubQ Before meals & nightly PRN 01/22/24 2027        Plan:  -SSI  -Accu-checks  -Hold oral " hypoglycemics while patient is in the hospital  -Hypoglycemic protocol         VTE Risk Mitigation (From admission, onward)           Ordered     Place sequential compression device  Until discontinued         01/23/24 0830     IP VTE HIGH RISK PATIENT  Once         01/22/24 2027     Place sequential compression device  Until discontinued         01/22/24 2027                    Discharge Planning   NARDA:      Code Status: Full Code   Is the patient medically ready for discharge?:     Reason for patient still in hospital (select all that apply): Patient trending condition, Consult recommendations, and Pending disposition                     Kyleigh Moore MD  Department of Hospital Medicine   [facility] - Telemetry ([facility])

## 2024-01-23 NOTE — H&P
[facility] - Emergency Dept.  [facility] Medicine  History & Physical    Patient Name: Tamy Allan  MRN: [id number]  Patient Class: IP- Inpatient  Admission Date: 1/22/2024  Attending Physician: Cesario Curran MD   Primary Care Provider: Irma Ro MD         Patient information was obtained from patient, relative(s), past medical records, and ER records.     Subjective:     Principal Problem:Abdominal pain    Chief Complaint:   Chief Complaint   Patient presents with    Abdominal Pain     Abd pain x several days and diarrhea x 3 weeks        HPI: Tamy Allan is a 78 y.o. female with a PMH  has a past medical history of Acquired hypothyroidism, Amblyopia, Arthritis, Benign hypertension, Cardiomegaly, Cataract, Diabetes mellitus, type 2, Encounter for blood transfusion, Encounter for general adult medical examination without abnormal findings (08/31/2016), Hyperlipidemia, Hyperlipidemia, Hypertension, Refractive error, Type 2 diabetes mellitus, and Vitamin D deficiency. who presented to the ED for further evaluation of worsening abdominal pain greatest in her left lower quadrant radiating to her back x3 weeks duration.  Pain is described as sharp/stabbing in nature, constant, radiating to her back and currently rated 4/10 in severity however was 10/10 in severity at its worst.  She reported no known alleviating factors with aggravating factors including movement and palpation to the affected area.  Associated symptoms included decreased p.o. intake over the past 3 weeks, unknown unintentional weight loss, increased daytime sleepiness, nausea and vomiting, and diarrhea which began earlier today.  She denied endorsing any lightheadedness, dizziness, headache, visual changes, fever, chills, sweats, chest pain, shortness of breath, dysuria, hematuria, melena, hematochezia, or onset neurological deficits.  Patient reported recently being diagnosed with UTI but did not finish her antibiotics. Patient's  son also reported she has been confused and not moved around with a past week.  Initial workup in the ED revealed patient had leukocytosis of 21.73, H/H of 9.7/30.6, renal impairment with Cr/GFR 1.4/39, and CT chest/abdomen/pelvis revealing multiple pulmonary nodules as well as multiple hepatic lesions concerning for underlying malignancy/neoplasm.  Lumbar x-ray in setting of lower back pain negative for acute fractures.  Patient denies history of smoking, alcohol abuse, or exposure to chemicals/pesticides however did report strong family history on extended family with her cousins having breast/ cancers. Cancer screening reported to be up-to-date and negative. Patient admitted to Hospital Medicine inpatient for continue malignancy workup while awaiting further evaluation/recommendations from Hematology/Oncology.       PCP: Irma Ro      Past Medical History:   Diagnosis Date    Acquired hypothyroidism     Amblyopia     OD    Arthritis     Benign hypertension     Cardiomegaly     Cataract     Diabetes mellitus, type 2     Encounter for blood transfusion     Encounter for general adult medical examination without abnormal findings 08/31/2016    Hyperlipidemia     Hyperlipidemia     Hypertension     Refractive error     Type 2 diabetes mellitus     Vitamin D deficiency        Past Surgical History:   Procedure Laterality Date    CARDIAC CATHETERIZATION      with stents    HYSTERECTOMY      TOTAL KNEE ARTHROPLASTY Left        Review of patient's allergies indicates:   Allergen Reactions    Aspirin     Ditropan [oxybutynin chloride]      itching    Iodine and iodide containing products     Iodinated contrast media Other (See Comments)     Unknown reaction per patient       No current facility-administered medications on file prior to encounter.     Current Outpatient Medications on File Prior to Encounter   Medication Sig    amLODIPine (NORVASC) 5 MG tablet TAKE 1 TABLET ONE TIME DAILY    aspirin 81 MG  Chew Take 81 mg by mouth once daily.    atorvastatin (LIPITOR) 40 MG tablet TAKE 1 TABLET EVERY DAY    CALCIUM CARB/VIT D3/MINERALS (CALTRATE PLUS ORAL) Take by mouth.    calcium carbonate (OS-LYNNETTE) 600 mg calcium (1,500 mg) Tab Take 1 tablet by mouth every morning.    carvediloL (COREG) 25 MG tablet TAKE 1 TABLET TWICE DAILY WITH MEALS    CETIRIZINE HCL (ZYRTEC ORAL) Take by mouth.    CYANOCOBALAMIN, VITAMIN B-12, (VITAMIN B-12 ORAL) Take by mouth once daily.     cyclobenzaprine (FLEXERIL) 10 MG tablet Take 1 tablet by mouth nightly as needed.    diphenhydrAMINE (BENADRYL) 50 MG capsule Take one tablet prior to scan    fluconazole (DIFLUCAN) 150 MG Tab One tab now and one tab in one week    fluticasone propionate (FLONASE) 50 mcg/actuation nasal spray USE 2 SPRAYS IN EACH NOSTRIL AT BEDTIME (SUBSTITUTED FOR  FLONASE)    hydrALAZINE (APRESOLINE) 10 MG tablet TAKE 1 TABLET THREE TIMES DAILY    levothyroxine (SYNTHROID) 88 MCG tablet Take 1 tablet (88 mcg total) by mouth once daily.    linaGLIPtin (TRADJENTA) 5 mg Tab tablet TAKE 1 TABLET(5 MG) BY MOUTH EVERY DAY    losartan (COZAAR) 100 MG tablet TAKE 1 TABLET ONE TIME DAILY    meclizine (ANTIVERT) 25 mg tablet TAKE 1 TABLET THREE TIMES DAILY AS NEEDED    montelukast (SINGULAIR) 10 mg tablet Take 1 tablet (10 mg total) by mouth every evening.    MULTIVITAMIN W-MINERALS/LUTEIN (CENTRUM SILVER ORAL) Take by mouth.    mupirocin (BACTROBAN) 2 % ointment Apply topically 3 (three) times daily.    nitroGLYCERIN (NITROSTAT) 0.3 MG SL tablet     predniSONE (DELTASONE) 50 MG Tab     senna-docusate 8.6-50 mg (PERICOLACE) 8.6-50 mg per tablet Take 2 tablets by mouth daily as needed.    vitamin D (VITAMIN D3) 1000 units Tab Take 2,000 Units by mouth.     Family History       Problem Relation (Age of Onset)    Heart disease Mother, Father, Sister, Brother    Hypertension Mother, Father, Sister, Brother          Tobacco Use    Smoking status: Never    Smokeless tobacco: Never    Substance and Sexual Activity    Alcohol use: No    Drug use: No    Sexual activity: Not Currently     Review of Systems   All other systems reviewed and are negative.    Objective:     Vital Signs (Most Recent):  Temp: 98.9 °F (37.2 °C) (01/22/24 2057)  Pulse: 88 (01/22/24 2057)  Resp: 20 (01/22/24 2057)  BP: 124/76 (01/22/24 2057)  SpO2: 96 % (01/22/24 1752) Vital Signs (24h Range):  Temp:  [98.8 °F (37.1 °C)-98.9 °F (37.2 °C)] 98.9 °F (37.2 °C)  Pulse:  [88-92] 88  Resp:  [18-20] 20  SpO2:  [96 %-97 %] 96 %  BP: (124-133)/(60-76) 124/76     Weight: 72.6 kg (160 lb)  Body mass index is 30.23 kg/m².     Physical Exam  Vitals reviewed.   Constitutional:       General: She is not in acute distress.     Appearance: Normal appearance. She is normal weight. She is not ill-appearing, toxic-appearing or diaphoretic.   HENT:      Head: Normocephalic and atraumatic.      Right Ear: External ear normal.      Left Ear: External ear normal.      Nose: Nose normal. No congestion or rhinorrhea.      Mouth/Throat:      Mouth: Mucous membranes are moist.      Pharynx: Oropharynx is clear. No oropharyngeal exudate or posterior oropharyngeal erythema.   Eyes:      General: No scleral icterus.     Extraocular Movements: Extraocular movements intact.      Conjunctiva/sclera: Conjunctivae normal.      Pupils: Pupils are equal, round, and reactive to light.   Neck:      Vascular: No carotid bruit.   Cardiovascular:      Rate and Rhythm: Normal rate and regular rhythm.      Pulses: Normal pulses.      Heart sounds: Normal heart sounds. No murmur heard.     No friction rub. No gallop.   Pulmonary:      Effort: Pulmonary effort is normal. No respiratory distress.      Breath sounds: Normal breath sounds. No stridor. No wheezing, rhonchi or rales.   Chest:      Chest wall: No tenderness.   Abdominal:      General: Abdomen is flat. Bowel sounds are normal. There is no distension.      Palpations: Abdomen is soft.      Tenderness: There is  abdominal tenderness. There is no right CVA tenderness, left CVA tenderness, guarding or rebound.      Hernia: No hernia is present.      Comments: Mild TTP throughout without evidence of guarding or rebound tenderness noted   Musculoskeletal:         General: No swelling, tenderness, deformity or signs of injury. Normal range of motion.      Cervical back: Normal range of motion and neck supple. No rigidity or tenderness.      Right lower leg: No edema.      Left lower leg: No edema.   Lymphadenopathy:      Cervical: No cervical adenopathy.   Skin:     General: Skin is warm and dry.      Capillary Refill: Capillary refill takes less than 2 seconds.      Coloration: Skin is not jaundiced or pale.      Findings: No bruising, erythema, lesion or rash.   Neurological:      General: No focal deficit present.      Mental Status: She is alert and oriented to person, place, and time. Mental status is at baseline.      Cranial Nerves: No cranial nerve deficit.      Sensory: No sensory deficit.      Motor: No weakness.      Coordination: Coordination normal.   Psychiatric:         Mood and Affect: Mood normal.         Behavior: Behavior normal.         Thought Content: Thought content normal.         Judgment: Judgment normal.              CRANIAL NERVES     CN III, IV, VI   Pupils are equal, round, and reactive to light.       Significant Labs: All pertinent labs within the past 24 hours have been reviewed.    Significant Imaging: I have reviewed all pertinent imaging results/findings within the past 24 hours.    LABS:  Recent Results (from the past 24 hour(s))   CBC auto differential    Collection Time: 01/22/24  4:05 PM   Result Value Ref Range    WBC 21.73 (H) 3.90 - 12.70 K/uL    RBC 3.63 (L) 4.00 - 5.40 M/uL    Hemoglobin 9.7 (L) 12.0 - 16.0 g/dL    Hematocrit 30.6 (L) 37.0 - 48.5 %    MCV 84 82 - 98 fL    MCH 26.7 (L) 27.0 - 31.0 pg    MCHC 31.7 (L) 32.0 - 36.0 g/dL    RDW 14.1 11.5 - 14.5 %    Platelets 438 150 - 450  K/uL    MPV 9.6 9.2 - 12.9 fL    Immature Granulocytes 0.9 (H) 0.0 - 0.5 %    Gran # (ANC) 17.6 (H) 1.8 - 7.7 K/uL    Immature Grans (Abs) 0.20 (H) 0.00 - 0.04 K/uL    Lymph # 1.2 1.0 - 4.8 K/uL    Mono # 2.5 (H) 0.3 - 1.0 K/uL    Eos # 0.2 0.0 - 0.5 K/uL    Baso # 0.06 0.00 - 0.20 K/uL    nRBC 0 0 /100 WBC    Gran % 80.8 (H) 38.0 - 73.0 %    Lymph % 5.7 (L) 18.0 - 48.0 %    Mono % 11.5 4.0 - 15.0 %    Eosinophil % 0.8 0.0 - 8.0 %    Basophil % 0.3 0.0 - 1.9 %    Differential Method Automated    Comprehensive metabolic panel    Collection Time: 01/22/24  4:05 PM   Result Value Ref Range    Sodium 142 136 - 145 mmol/L    Potassium 3.6 3.5 - 5.1 mmol/L    Chloride 109 95 - 110 mmol/L    CO2 25 23 - 29 mmol/L    Glucose 145 (H) 70 - 110 mg/dL    BUN 32 (H) 8 - 23 mg/dL    Creatinine 1.4 0.5 - 1.4 mg/dL    Calcium 8.8 8.7 - 10.5 mg/dL    Total Protein 6.2 6.0 - 8.4 g/dL    Albumin 2.0 (L) 3.5 - 5.2 g/dL    Total Bilirubin 0.6 0.1 - 1.0 mg/dL    Alkaline Phosphatase 347 (H) 55 - 135 U/L    AST 52 (H) 10 - 40 U/L    ALT 14 10 - 44 U/L    eGFR 39 (A) >60 mL/min/1.73 m^2    Anion Gap 8 8 - 16 mmol/L   Lipase    Collection Time: 01/22/24  4:05 PM   Result Value Ref Range    Lipase 15 4 - 60 U/L   Lactic acid, plasma    Collection Time: 01/22/24  4:53 PM   Result Value Ref Range    Lactate (Lactic Acid) 0.6 0.5 - 2.2 mmol/L       RADIOLOGY  CT Chest Without Contrast    Result Date: 1/22/2024  EXAMINATION: CT CHEST WITHOUT CONTRAST CLINICAL HISTORY: Lymphadenopathy, chest or axilla; TECHNIQUE: Low dose axial images, sagittal and coronal reformations were obtained from the thoracic inlet to the lung bases. Contrast was not administered. COMPARISON: None FINDINGS: Base of Neck: No significant abnormality. Thoracic soft tissues: Normal. Aorta: Left-sided aortic arch.  No aneurysm and no significant atherosclerosis Heart: Trace pericardial effusion.  Normal size. Pulmonary vasculature: Pulmonary arteries distribute normally.   There are four pulmonary veins. Rylee/Mediastinum: No pathologic mauri enlargement. Airways: Patent. Lungs/Pleura: Small bilateral pleural effusions.  Innumerable small nodular opacities throughout the lungs.  Motion severely degrades fine parenchymal evaluation and accurate determination of number and size of nodules.  These would be presumed neoplastic and metastatic until proven otherwise. Esophagus: Normal. Bones: No acute fracture. No suspicious lytic or sclerotic lesions.  Osseous degenerative changes.     Innumerable pulmonary nodules primarily concerning for metastatic disease.  Evaluation of number and size is limited by motion. Small bilateral pleural effusions. Trace pericardial effusion. Complete findings as above. This report was flagged in Epic as abnormal. All CT scans at this facility are performed  using dose modulation techniques as appropriate to performed exam including the following:  automated exposure control; adjustment of mA and/or kV according to the patients size (this includes techniques or standardized protocols for targeted exams where dose is matched to indication/reason for exam: i.e. extremities or head);  iterative reconstruction technique. Electronically signed by: Ayush Moss Date:    01/22/2024 Time:    20:31    CT Abdomen Pelvis  Without Contrast    Result Date: 1/22/2024  EXAMINATION: CT ABDOMEN PELVIS WITHOUT CONTRAST CLINICAL HISTORY: Abdominal pain, acute, nonlocalized; TECHNIQUE: Low dose axial images, sagittal and coronal reformations were obtained from the lung bases to the pubic symphysis.  Contrast was not administered. COMPARISON: None FINDINGS: Heart: Normal in size. No pericardial effusion. Lung Bases: At least small bilateral pleural effusions.  Increased interstitial attenuation.  Innumerable nodular pulmonary opacities in the lung bases favored related to neoplasm Liver: Multiple hepatic hypodensities most concerning for neoplasm. Gallbladder: Irregularity along  the gallbladder lumen possibly stones. Bile Ducts: No evidence of dilated ducts. Pancreas: No mass or peripancreatic fat stranding. Spleen: Unremarkable. Adrenals: Unremarkable. Kidneys/ Ureters: Multiple renal hypodensities not all fully characterized but favoring simple cysts.  No hydronephrosis.  Nonobstructive left nephrolithiasis. Bladder: No evidence of wall thickening. Reproductive organs: Uterus is surgically absent. GI Tract/Mesentery: Diverticulosis without diverticulitis.  No evidence of appendicitis. Peritoneal Space: Small volume abdominopelvic ascites.  No free air. Retroperitoneum: Retroperitoneal adenopathy. Abdominal wall: Unremarkable. Vasculature: No significant atherosclerosis or aneurysm. Bones: No acute fracture.  Osseous degenerative changes.     Innumerable hepatic lesions consistent with metastatic disease.  Additional retroperitoneal adenopathy. At least small bilateral pleural effusions. Increased interstitial attenuation. Innumerable nodular pulmonary opacities in the lung bases favored related to neoplasm. Small volume abdominopelvic ascites. Complete findings as above. This report was flagged in Epic as abnormal. All CT scans at this facility are performed  using dose modulation techniques as appropriate to performed exam including the following:  automated exposure control; adjustment of mA and/or kV according to the patients size (this includes techniques or standardized protocols for targeted exams where dose is matched to indication/reason for exam: i.e. extremities or head);  iterative reconstruction technique. Electronically signed by: Ayush Moss Date:    01/22/2024 Time:    18:34      EKG    MICROBIOLOGY    [facility]    Assessment/Plan:     * Abdominal pain    Metastatic disease  Patient has evidence of metastatic cancer of unknown primary. The cancer has metastasized to liver and lungs.CT chest/abdomen/pelvis revealed evidence of numerous pulmonary nodules as well as numerous  "hepatic lesions.  Patient reports being up-to-date on cancer screenings. The patient is not under the care of an outpatient oncologist. The patient is not undergoing active chemotherapy. Their staging information is listed below.   Cancer Staging   No matching staging information was found for the patient.  Plan:  -CEA, AFP,   -f/u hematology/oncology       Diabetes mellitus  Patient's FSGs are controlled on current medication regimen.  Last A1c reviewed-   Lab Results   Component Value Date    LABA1C 5.9 07/02/2020    HGBA1C 6.3 (H) 01/05/2024     Most recent fingerstick glucose reviewed- No results for input(s): "POCTGLUCOSE" in the last 24 hours.  Current correctional scale  Low  titrate as needed  anti-hyperglycemic dose as follows-   Antihyperglycemics (From admission, onward)      Start     Stop Route Frequency Ordered    01/22/24 2126  insulin aspart U-100 pen 0-5 Units         -- SubQ Before meals & nightly PRN 01/22/24 2027        Plan:  -SSI  -Accu-checks  -Hold oral hypoglycemics while patient is in the hospital  -Hypoglycemic protocol       Coronary artery disease involving native coronary artery of native heart without angina pectoris  Patient with known CAD s/p stent placement, which is controlled Will continue ASA, Plavix, and Statin and monitor for S/Sx of angina/ACS. Continue to monitor on telemetry.       Essential hypertension  Chronic, controlled. Latest blood pressure and vitals reviewed-     Temp:  [98.8 °F (37.1 °C)-98.9 °F (37.2 °C)]   Pulse:  [88-92]   Resp:  [18-20]   BP: (124-133)/(60-76)   SpO2:  [96 %-97 %] .   Home meds for hypertension were reviewed and noted below.   Hypertension Medications               amLODIPine (NORVASC) 5 MG tablet TAKE 1 TABLET ONE TIME DAILY    carvediloL (COREG) 25 MG tablet TAKE 1 TABLET TWICE DAILY WITH MEALS    hydrALAZINE (APRESOLINE) 10 MG tablet TAKE 1 TABLET THREE TIMES DAILY    losartan (COZAAR) 100 MG tablet TAKE 1 TABLET ONE TIME DAILY    " nitroGLYCERIN (NITROSTAT) 0.3 MG SL tablet           While in the hospital, will manage blood pressure as follows; Continue home antihypertensive regimen    Will utilize p.r.n. blood pressure medication only if patient's blood pressure greater than 160/100 and she develops symptoms such as worsening chest pain or shortness of breath.      CKD (chronic kidney disease) stage 3, GFR 30-59 ml/min  Creatine stable for now. BMP reviewed- noted Estimated Creatinine Clearance: 30.7 mL/min (based on SCr of 1.4 mg/dL). according to latest data. Based on current GFR, CKD stage is stage 3 - GFR 30-59.  Monitor UOP and serial BMP and adjust therapy as needed. Renally dose meds. Avoid nephrotoxic medications and procedures.      Anemia in chronic kidney disease  Patient's anemia is currently  lower than last reported baseline of 11.4/35.4 . Has not received any PRBCs to date. Etiology likely d/t Iron deficiency and chronic disease due to Chronic Kidney Disease/ESRD in addition to underlying malignancy.   Current CBC reviewed-   Lab Results   Component Value Date    HGB 9.7 (L) 01/22/2024    HCT 30.6 (L) 01/22/2024     Monitor serial CBC and transfuse if patient becomes hemodynamically unstable, symptomatic or H/H drops below 7/21.      Hypothyroidism  Patient has chronic hypothyroidism. TFTs reviewed-   Lab Results   Component Value Date    TSH 0.688 10/03/2023   Plan:  -Will continue chronic levothyroxine and adjust for and clinical changes      Hyperlipidemia  Patient is chronically on statin.will continue for now. Last Lipid Panel:   Lab Results   Component Value Date    CHOL 111 10/03/2023    HDL 29 (L) 10/03/2023    LDLCALC 54 10/03/2023    TRIG 138 10/03/2023   Plan:  -Continue home medication  -low fat/low calorie diet      Gout  Not in acute flare.   Plan:  -continue to monitor         VTE Risk Mitigation (From admission, onward)           Ordered     enoxaparin injection 40 mg  Daily         01/22/24 2027     IP VTE HIGH  RISK PATIENT  Once         01/22/24 2027     Place sequential compression device  Until discontinued         01/22/24 2027                  //Core Measures   -DVT proph: SCDs, Lovenox   -Code status: Full    -Surrogate: son      Components of this note were documented using a voice recognition system and are subject to errors not corrected at the time the document was proof read. Please contact the author for any clarifications.        Cesario Curran MD  Department of Hospital Medicine  [facility] - Emergency Dept.

## 2024-01-23 NOTE — SUBJECTIVE & OBJECTIVE
Past Medical History:   Diagnosis Date    Acquired hypothyroidism     Amblyopia     OD    Arthritis     Benign hypertension     Cardiomegaly     Cataract     Diabetes mellitus, type 2     Encounter for blood transfusion     Encounter for general adult medical examination without abnormal findings 08/31/2016    Hyperlipidemia     Hyperlipidemia     Hypertension     Refractive error     Type 2 diabetes mellitus     Vitamin D deficiency        Past Surgical History:   Procedure Laterality Date    CARDIAC CATHETERIZATION      with stents    HYSTERECTOMY      TOTAL KNEE ARTHROPLASTY Left        Review of patient's allergies indicates:   Allergen Reactions    Aspirin     Ditropan [oxybutynin chloride]      itching    Iodine and iodide containing products     Iodinated contrast media Other (See Comments)     Unknown reaction per patient       No current facility-administered medications on file prior to encounter.     Current Outpatient Medications on File Prior to Encounter   Medication Sig    amLODIPine (NORVASC) 5 MG tablet TAKE 1 TABLET ONE TIME DAILY    aspirin 81 MG Chew Take 81 mg by mouth once daily.    atorvastatin (LIPITOR) 40 MG tablet TAKE 1 TABLET EVERY DAY    CALCIUM CARB/VIT D3/MINERALS (CALTRATE PLUS ORAL) Take by mouth.    calcium carbonate (OS-LYNNETTE) 600 mg calcium (1,500 mg) Tab Take 1 tablet by mouth every morning.    carvediloL (COREG) 25 MG tablet TAKE 1 TABLET TWICE DAILY WITH MEALS    CETIRIZINE HCL (ZYRTEC ORAL) Take by mouth.    CYANOCOBALAMIN, VITAMIN B-12, (VITAMIN B-12 ORAL) Take by mouth once daily.     cyclobenzaprine (FLEXERIL) 10 MG tablet Take 1 tablet by mouth nightly as needed.    diphenhydrAMINE (BENADRYL) 50 MG capsule Take one tablet prior to scan    fluconazole (DIFLUCAN) 150 MG Tab One tab now and one tab in one week    fluticasone propionate (FLONASE) 50 mcg/actuation nasal spray USE 2 SPRAYS IN EACH NOSTRIL AT BEDTIME (SUBSTITUTED FOR  FLONASE)    hydrALAZINE (APRESOLINE) 10 MG  tablet TAKE 1 TABLET THREE TIMES DAILY    levothyroxine (SYNTHROID) 88 MCG tablet Take 1 tablet (88 mcg total) by mouth once daily.    linaGLIPtin (TRADJENTA) 5 mg Tab tablet TAKE 1 TABLET(5 MG) BY MOUTH EVERY DAY    losartan (COZAAR) 100 MG tablet TAKE 1 TABLET ONE TIME DAILY    meclizine (ANTIVERT) 25 mg tablet TAKE 1 TABLET THREE TIMES DAILY AS NEEDED    montelukast (SINGULAIR) 10 mg tablet Take 1 tablet (10 mg total) by mouth every evening.    MULTIVITAMIN W-MINERALS/LUTEIN (CENTRUM SILVER ORAL) Take by mouth.    mupirocin (BACTROBAN) 2 % ointment Apply topically 3 (three) times daily.    nitroGLYCERIN (NITROSTAT) 0.3 MG SL tablet     predniSONE (DELTASONE) 50 MG Tab     senna-docusate 8.6-50 mg (PERICOLACE) 8.6-50 mg per tablet Take 2 tablets by mouth daily as needed.    vitamin D (VITAMIN D3) 1000 units Tab Take 2,000 Units by mouth.     Family History       Problem Relation (Age of Onset)    Heart disease Mother, Father, Sister, Brother    Hypertension Mother, Father, Sister, Brother          Tobacco Use    Smoking status: Never    Smokeless tobacco: Never   Substance and Sexual Activity    Alcohol use: No    Drug use: No    Sexual activity: Not Currently     Review of Systems   All other systems reviewed and are negative.    Objective:     Vital Signs (Most Recent):  Temp: 98.9 °F (37.2 °C) (01/22/24 2057)  Pulse: 88 (01/22/24 2057)  Resp: 20 (01/22/24 2057)  BP: 124/76 (01/22/24 2057)  SpO2: 96 % (01/22/24 1752) Vital Signs (24h Range):  Temp:  [98.8 °F (37.1 °C)-98.9 °F (37.2 °C)] 98.9 °F (37.2 °C)  Pulse:  [88-92] 88  Resp:  [18-20] 20  SpO2:  [96 %-97 %] 96 %  BP: (124-133)/(60-76) 124/76     Weight: 72.6 kg (160 lb)  Body mass index is 30.23 kg/m².     Physical Exam  Vitals reviewed.   Constitutional:       General: She is not in acute distress.     Appearance: Normal appearance. She is normal weight. She is not ill-appearing, toxic-appearing or diaphoretic.   HENT:      Head: Normocephalic and  atraumatic.      Right Ear: External ear normal.      Left Ear: External ear normal.      Nose: Nose normal. No congestion or rhinorrhea.      Mouth/Throat:      Mouth: Mucous membranes are moist.      Pharynx: Oropharynx is clear. No oropharyngeal exudate or posterior oropharyngeal erythema.   Eyes:      General: No scleral icterus.     Extraocular Movements: Extraocular movements intact.      Conjunctiva/sclera: Conjunctivae normal.      Pupils: Pupils are equal, round, and reactive to light.   Neck:      Vascular: No carotid bruit.   Cardiovascular:      Rate and Rhythm: Normal rate and regular rhythm.      Pulses: Normal pulses.      Heart sounds: Normal heart sounds. No murmur heard.     No friction rub. No gallop.   Pulmonary:      Effort: Pulmonary effort is normal. No respiratory distress.      Breath sounds: Normal breath sounds. No stridor. No wheezing, rhonchi or rales.   Chest:      Chest wall: No tenderness.   Abdominal:      General: Abdomen is flat. Bowel sounds are normal. There is no distension.      Palpations: Abdomen is soft.      Tenderness: There is abdominal tenderness. There is no right CVA tenderness, left CVA tenderness, guarding or rebound.      Hernia: No hernia is present.      Comments: Mild TTP throughout without evidence of guarding or rebound tenderness noted   Musculoskeletal:         General: No swelling, tenderness, deformity or signs of injury. Normal range of motion.      Cervical back: Normal range of motion and neck supple. No rigidity or tenderness.      Right lower leg: No edema.      Left lower leg: No edema.   Lymphadenopathy:      Cervical: No cervical adenopathy.   Skin:     General: Skin is warm and dry.      Capillary Refill: Capillary refill takes less than 2 seconds.      Coloration: Skin is not jaundiced or pale.      Findings: No bruising, erythema, lesion or rash.   Neurological:      General: No focal deficit present.      Mental Status: She is alert and oriented  to person, place, and time. Mental status is at baseline.      Cranial Nerves: No cranial nerve deficit.      Sensory: No sensory deficit.      Motor: No weakness.      Coordination: Coordination normal.   Psychiatric:         Mood and Affect: Mood normal.         Behavior: Behavior normal.         Thought Content: Thought content normal.         Judgment: Judgment normal.              CRANIAL NERVES     CN III, IV, VI   Pupils are equal, round, and reactive to light.       Significant Labs: All pertinent labs within the past 24 hours have been reviewed.    Significant Imaging: I have reviewed all pertinent imaging results/findings within the past 24 hours.    LABS:  Recent Results (from the past 24 hour(s))   CBC auto differential    Collection Time: 01/22/24  4:05 PM   Result Value Ref Range    WBC 21.73 (H) 3.90 - 12.70 K/uL    RBC 3.63 (L) 4.00 - 5.40 M/uL    Hemoglobin 9.7 (L) 12.0 - 16.0 g/dL    Hematocrit 30.6 (L) 37.0 - 48.5 %    MCV 84 82 - 98 fL    MCH 26.7 (L) 27.0 - 31.0 pg    MCHC 31.7 (L) 32.0 - 36.0 g/dL    RDW 14.1 11.5 - 14.5 %    Platelets 438 150 - 450 K/uL    MPV 9.6 9.2 - 12.9 fL    Immature Granulocytes 0.9 (H) 0.0 - 0.5 %    Gran # (ANC) 17.6 (H) 1.8 - 7.7 K/uL    Immature Grans (Abs) 0.20 (H) 0.00 - 0.04 K/uL    Lymph # 1.2 1.0 - 4.8 K/uL    Mono # 2.5 (H) 0.3 - 1.0 K/uL    Eos # 0.2 0.0 - 0.5 K/uL    Baso # 0.06 0.00 - 0.20 K/uL    nRBC 0 0 /100 WBC    Gran % 80.8 (H) 38.0 - 73.0 %    Lymph % 5.7 (L) 18.0 - 48.0 %    Mono % 11.5 4.0 - 15.0 %    Eosinophil % 0.8 0.0 - 8.0 %    Basophil % 0.3 0.0 - 1.9 %    Differential Method Automated    Comprehensive metabolic panel    Collection Time: 01/22/24  4:05 PM   Result Value Ref Range    Sodium 142 136 - 145 mmol/L    Potassium 3.6 3.5 - 5.1 mmol/L    Chloride 109 95 - 110 mmol/L    CO2 25 23 - 29 mmol/L    Glucose 145 (H) 70 - 110 mg/dL    BUN 32 (H) 8 - 23 mg/dL    Creatinine 1.4 0.5 - 1.4 mg/dL    Calcium 8.8 8.7 - 10.5 mg/dL    Total Protein  6.2 6.0 - 8.4 g/dL    Albumin 2.0 (L) 3.5 - 5.2 g/dL    Total Bilirubin 0.6 0.1 - 1.0 mg/dL    Alkaline Phosphatase 347 (H) 55 - 135 U/L    AST 52 (H) 10 - 40 U/L    ALT 14 10 - 44 U/L    eGFR 39 (A) >60 mL/min/1.73 m^2    Anion Gap 8 8 - 16 mmol/L   Lipase    Collection Time: 01/22/24  4:05 PM   Result Value Ref Range    Lipase 15 4 - 60 U/L   Lactic acid, plasma    Collection Time: 01/22/24  4:53 PM   Result Value Ref Range    Lactate (Lactic Acid) 0.6 0.5 - 2.2 mmol/L       RADIOLOGY  CT Chest Without Contrast    Result Date: 1/22/2024  EXAMINATION: CT CHEST WITHOUT CONTRAST CLINICAL HISTORY: Lymphadenopathy, chest or axilla; TECHNIQUE: Low dose axial images, sagittal and coronal reformations were obtained from the thoracic inlet to the lung bases. Contrast was not administered. COMPARISON: None FINDINGS: Base of Neck: No significant abnormality. Thoracic soft tissues: Normal. Aorta: Left-sided aortic arch.  No aneurysm and no significant atherosclerosis Heart: Trace pericardial effusion.  Normal size. Pulmonary vasculature: Pulmonary arteries distribute normally.  There are four pulmonary veins. Rylee/Mediastinum: No pathologic mauri enlargement. Airways: Patent. Lungs/Pleura: Small bilateral pleural effusions.  Innumerable small nodular opacities throughout the lungs.  Motion severely degrades fine parenchymal evaluation and accurate determination of number and size of nodules.  These would be presumed neoplastic and metastatic until proven otherwise. Esophagus: Normal. Bones: No acute fracture. No suspicious lytic or sclerotic lesions.  Osseous degenerative changes.     Innumerable pulmonary nodules primarily concerning for metastatic disease.  Evaluation of number and size is limited by motion. Small bilateral pleural effusions. Trace pericardial effusion. Complete findings as above. This report was flagged in Epic as abnormal. All CT scans at this facility are performed  using dose modulation techniques as  appropriate to performed exam including the following:  automated exposure control; adjustment of mA and/or kV according to the patients size (this includes techniques or standardized protocols for targeted exams where dose is matched to indication/reason for exam: i.e. extremities or head);  iterative reconstruction technique. Electronically signed by: Ayush Moss Date:    01/22/2024 Time:    20:31    CT Abdomen Pelvis  Without Contrast    Result Date: 1/22/2024  EXAMINATION: CT ABDOMEN PELVIS WITHOUT CONTRAST CLINICAL HISTORY: Abdominal pain, acute, nonlocalized; TECHNIQUE: Low dose axial images, sagittal and coronal reformations were obtained from the lung bases to the pubic symphysis.  Contrast was not administered. COMPARISON: None FINDINGS: Heart: Normal in size. No pericardial effusion. Lung Bases: At least small bilateral pleural effusions.  Increased interstitial attenuation.  Innumerable nodular pulmonary opacities in the lung bases favored related to neoplasm Liver: Multiple hepatic hypodensities most concerning for neoplasm. Gallbladder: Irregularity along the gallbladder lumen possibly stones. Bile Ducts: No evidence of dilated ducts. Pancreas: No mass or peripancreatic fat stranding. Spleen: Unremarkable. Adrenals: Unremarkable. Kidneys/ Ureters: Multiple renal hypodensities not all fully characterized but favoring simple cysts.  No hydronephrosis.  Nonobstructive left nephrolithiasis. Bladder: No evidence of wall thickening. Reproductive organs: Uterus is surgically absent. GI Tract/Mesentery: Diverticulosis without diverticulitis.  No evidence of appendicitis. Peritoneal Space: Small volume abdominopelvic ascites.  No free air. Retroperitoneum: Retroperitoneal adenopathy. Abdominal wall: Unremarkable. Vasculature: No significant atherosclerosis or aneurysm. Bones: No acute fracture.  Osseous degenerative changes.     Innumerable hepatic lesions consistent with metastatic disease.  Additional  retroperitoneal adenopathy. At least small bilateral pleural effusions. Increased interstitial attenuation. Innumerable nodular pulmonary opacities in the lung bases favored related to neoplasm. Small volume abdominopelvic ascites. Complete findings as above. This report was flagged in Epic as abnormal. All CT scans at this facility are performed  using dose modulation techniques as appropriate to performed exam including the following:  automated exposure control; adjustment of mA and/or kV according to the patients size (this includes techniques or standardized protocols for targeted exams where dose is matched to indication/reason for exam: i.e. extremities or head);  iterative reconstruction technique. Electronically signed by: Ayush Moss Date:    01/22/2024 Time:    18:34      EKG    MICROBIOLOGY    MDM

## 2024-01-23 NOTE — HOSPITAL COURSE
Tamy Allan is a 78 y.o. female with a PMH  has a past medical history of Acquired hypothyroidism, Amblyopia, Arthritis, Benign hypertension, Cardiomegaly, Cataract, Diabetes mellitus, type 2, Encounter for blood transfusion, Encounter for general adult medical examination without abnormal findings (08/31/2016), Hyperlipidemia, Hyperlipidemia, Hypertension, Refractive error, Type 2 diabetes mellitus, and Vitamin D deficiency. who presented to the ED for further evaluation of worsening abdominal pain greatest in her left lower quadrant radiating to her back x3 weeks duration.    Associated symptoms included decreased p.o. intake over the past 3 weeks, unknown unintentional weight loss, increased daytime sleepiness, nausea and vomiting, and diarrhea which began earlier today.  Patient reported recently being diagnosed with UTI but did not finish her antibiotics. Patient's son also reported she has been confused and not moved around with a past week.  CT chest/abdomen/pelvis revealing multiple pulmonary nodules as well as multiple hepatic lesions concerning for underlying malignancy/neoplasm.  Lumbar x-ray in setting of lower back pain negative for acute fractures.  Patient denies history of smoking, alcohol abuse, or exposure to chemicals/pesticides however did report strong family history on extended family with her cousins having breast/ cancers. Cancer screening reported to be up-to-date and negative. Patient admitted to Hospital Medicine inpatient for continue malignancy workup with Heme-Onc follow up   Heme-Onc evaluated and recommended IR guided biopsy.  Appreciate Heme-Onc inputs;  Discussed with intervention radiologist Dr. Gaytan- recommended to continue to hold aspirin, Lovenox/blood thinners, status post IR guided liver biopsy on Thursday, 01/25/2024,;   ultrasound abdomen as of 1/26/24 showed- Multiple known liver masses suspicious for metastatic disease.  The gallbladder is abnormal in appearance  with multiple gallstones as well as a 2.4 cm hypoechoic finding within it suspicious for mass.  Additionally there is some irregular gallbladder wall thickening.  Gallbladder is abnormal with gallstones as well as masslike findings which could represent a primary gallbladder malignancy.  Mild ascites.    On 01/28/2024, examination done at bedside, appeared alert and oriented x3, denied acute issues overnight.  Denied fever, chills, chest pain, shortness O breath, nausea, vomiting, bowel or bladder issues.  Denied any further episodes of diarrhea.    Appeared hemodynamically stable, afebrile, WBC remained stable-- discussed with infectious disease Dr. Xie--procalcitonin trended down, stool culture positive for Campylobacter, repeat CT chest did not show acute findings, accordingly stated persistent leukocytosis likely reactive/inflammatory from underlying malignancy, partly from positive stool cultures,--accordingly recommended azithromycin for 7 days upon discharge, with outpatient follow up.    Status post liver biopsy on 01/25/2024, with results pending, provided referral for Heme-Onc upon discharge.    Considering clinical and hemodynamic stability, planning to discharge patient today, emphasized noncompliance with medications, outpatient follow up with PCP, Heme-Onc, Infectious Disease upon discharge, patient/family expressed understanding, agreed to the plan.    Qualified for oxygen on exertion,  worked on arrangements.    Given patient's age, underlying comorbidities, recent new findings suspicious for malignant disease, had extensive discussion with patient/family at bedside regarding goals of care/code status,--stated full code for now,  stated that they would like to follow up on biopsy results , as of now stated that they are inclining to proceed with aggressive treatment if needed.  Frail patient with at risk for rapid deterioration, high-risk for readmission, ordered NP at home program,  ordered home health.  (PT/OT eval and recommended low intensity therapy)

## 2024-01-23 NOTE — ASSESSMENT & PLAN NOTE
Patient is chronically on statin.will continue for now. Last Lipid Panel:   Lab Results   Component Value Date    CHOL 111 10/03/2023    HDL 29 (L) 10/03/2023    LDLCALC 54 10/03/2023    TRIG 138 10/03/2023   Plan:  -Continue home medication  -low fat/low calorie diet

## 2024-01-23 NOTE — ASSESSMENT & PLAN NOTE
Patient has evidence of metastatic cancer of unknown primary. The cancer has metastasized to liver and lungs.CT chest/abdomen/pelvis revealed evidence of numerous pulmonary nodules as well as numerous hepatic lesions.  Patient reports being up-to-date on cancer screenings. The patient is not under the care of an outpatient oncologist. The patient is not undergoing active chemotherapy. Their staging information is listed below.   Cancer Staging   No matching staging information was found for the patient.  Plan:  -CEA, AFP,   -f/u hematology/oncology

## 2024-01-23 NOTE — ASSESSMENT & PLAN NOTE
"Patient's FSGs are controlled on current medication regimen.  Last A1c reviewed-   Lab Results   Component Value Date    LABA1C 5.9 07/02/2020    HGBA1C 6.3 (H) 01/05/2024     Most recent fingerstick glucose reviewed- No results for input(s): "POCTGLUCOSE" in the last 24 hours.  Current correctional scale  Low  titrate as needed  anti-hyperglycemic dose as follows-   Antihyperglycemics (From admission, onward)      Start     Stop Route Frequency Ordered    01/22/24 2126  insulin aspart U-100 pen 0-5 Units         -- SubQ Before meals & nightly PRN 01/22/24 2027        Plan:  -SSI  -Accu-checks  -Hold oral hypoglycemics while patient is in the hospital  -Hypoglycemic protocol     "

## 2024-01-23 NOTE — HPI
Admitted late last night via ED  Presented with several weeks of increasing abdominal pain, decreased oral intake and weight loss, increased confusion    Scans revealed:  - CT Chest:   FINDINGS:  Base of Neck: No significant abnormality.  Thoracic soft tissues: Normal.  Aorta: Left-sided aortic arch.  No aneurysm and no significant atherosclerosis  Heart: Trace pericardial effusion.  Normal size.  Pulmonary vasculature: Pulmonary arteries distribute normally.  There are four pulmonary veins.  Rylee/Mediastinum: No pathologic mauri enlargement.  Airways: Patent.  Lungs/Pleura: Small bilateral pleural effusions.  Innumerable small nodular opacities throughout the lungs.  Motion severely degrades fine parenchymal evaluation and accurate determination of number and size of nodules.  These would be presumed neoplastic and metastatic until proven otherwise.  Esophagus: Normal.  Bones: No acute fracture. No suspicious lytic or sclerotic lesions.  Osseous degenerative changes.  Impression:  Innumerable pulmonary nodules primarily concerning for metastatic disease.  Evaluation of number and size is limited by motion.  Small bilateral pleural effusions.  Trace pericardial effusion.    - CT Abd/Pelvis:  FINDINGS:  Heart: Normal in size. No pericardial effusion.  Lung Bases: At least small bilateral pleural effusions.  Increased interstitial attenuation.  Innumerable nodular pulmonary opacities in the lung bases favored related to neoplasm  Liver: Multiple hepatic hypodensities most concerning for neoplasm.  Gallbladder: Irregularity along the gallbladder lumen possibly stones.  Bile Ducts: No evidence of dilated ducts.  Pancreas: No mass or peripancreatic fat stranding.  Spleen: Unremarkable.  Adrenals: Unremarkable.  Kidneys/ Ureters: Multiple renal hypodensities not all fully characterized but favoring simple cysts.  No hydronephrosis.  Nonobstructive left nephrolithiasis.  Bladder: No evidence of wall  thickening.  Reproductive organs: Uterus is surgically absent.  GI Tract/Mesentery: Diverticulosis without diverticulitis.  No evidence of appendicitis.  Peritoneal Space: Small volume abdominopelvic ascites.  No free air.  Retroperitoneum: Retroperitoneal adenopathy.  Abdominal wall: Unremarkable.  Vasculature: No significant atherosclerosis or aneurysm.  Bones: No acute fracture.  Osseous degenerative changes.  Impression:  Innumerable hepatic lesions consistent with metastatic disease.  Additional retroperitoneal adenopathy.  At least small bilateral pleural effusions. Increased interstitial attenuation. Innumerable nodular pulmonary opacities in the lung bases favored related to neoplasm.  Small volume abdominopelvic ascites.     Labs:  Albumin= 1.8  AST/ALT= 42/13  Alk phos= 318  Tb = 0.5  H/H= 8.8, 27.9  WBC= 19.46  Cr= 1.4      Tamy Allan is a 78 y.o. female with a PMH  has a past medical history of Acquired hypothyroidism, Amblyopia, Arthritis, Benign hypertension, Cardiomegaly, Cataract, Diabetes mellitus, type 2, Encounter for blood transfusion, Encounter for general adult medical examination without abnormal findings (08/31/2016), Hyperlipidemia, Hyperlipidemia, Hypertension, Refractive error, Type 2 diabetes mellitus, and Vitamin D deficiency. who presented to the ED for further evaluation of worsening abdominal pain greatest in her left lower quadrant radiating to her back x3 weeks duration.  Pain is described as sharp/stabbing in nature, constant, radiating to her back and currently rated 4/10 in severity however was 10/10 in severity at its worst.  She reported no known alleviating factors with aggravating factors including movement and palpation to the affected area.  Associated symptoms included decreased p.o. intake over the past 3 weeks, unknown unintentional weight loss, increased daytime sleepiness, nausea and vomiting, and diarrhea which began earlier today.  She denied endorsing any  lightheadedness, dizziness, headache, visual changes, fever, chills, sweats, chest pain, shortness of breath, dysuria, hematuria, melena, hematochezia, or onset neurological deficits.  Patient reported recently being diagnosed with UTI but did not finish her antibiotics. Patient's son also reported she has been confused and not moved around with a past week.  Initial workup in the ED revealed patient had leukocytosis of 21.73, H/H of 9.7/30.6, renal impairment with Cr/GFR 1.4/39, and CT chest/abdomen/pelvis revealing multiple pulmonary nodules as well as multiple hepatic lesions concerning for underlying malignancy/neoplasm.  Lumbar x-ray in setting of lower back pain negative for acute fractures.  Patient denies history of smoking, alcohol abuse, or exposure to chemicals/pesticides however did report strong family history on extended family with her cousins having breast/ cancers. Cancer screening reported to be up-to-date and negative. Patient admitted to Hospital Medicine inpatient for continue malignancy workup while awaiting further evaluation/recommendations from Hematology/Oncology.

## 2024-01-23 NOTE — ASSESSMENT & PLAN NOTE
Patient has chronic hypothyroidism. TFTs reviewed-   Lab Results   Component Value Date    TSH 0.688 10/03/2023   Plan:  -Will continue chronic levothyroxine and adjust for and clinical changes

## 2024-01-23 NOTE — ASSESSMENT & PLAN NOTE
Creatine stable for now. BMP reviewed- noted Estimated Creatinine Clearance: 30.7 mL/min (based on SCr of 1.4 mg/dL). according to latest data. Based on current GFR, CKD stage is stage 3 - GFR 30-59.  Monitor UOP and serial BMP and adjust therapy as needed. Renally dose meds. Avoid nephrotoxic medications and procedures.

## 2024-01-23 NOTE — HPI
Tamy Allan is a 78 y.o. female with a PMH  has a past medical history of Acquired hypothyroidism, Amblyopia, Arthritis, Benign hypertension, Cardiomegaly, Cataract, Diabetes mellitus, type 2, Encounter for blood transfusion, Encounter for general adult medical examination without abnormal findings (08/31/2016), Hyperlipidemia, Hyperlipidemia, Hypertension, Refractive error, Type 2 diabetes mellitus, and Vitamin D deficiency. who presented to the ED for further evaluation of worsening abdominal pain greatest in her left lower quadrant radiating to her back x3 weeks duration.  Pain is described as sharp/stabbing in nature, constant, radiating to her back and currently rated 4/10 in severity however was 10/10 in severity at its worst.  She reported no known alleviating factors with aggravating factors including movement and palpation to the affected area.  Associated symptoms included decreased p.o. intake over the past 3 weeks, unknown unintentional weight loss, increased daytime sleepiness, nausea and vomiting, and diarrhea which began earlier today.  She denied endorsing any lightheadedness, dizziness, headache, visual changes, fever, chills, sweats, chest pain, shortness of breath, dysuria, hematuria, melena, hematochezia, or onset neurological deficits.  Patient reported recently being diagnosed with UTI but did not finish her antibiotics. Patient's son also reported she has been confused and not moved around with a past week.  Initial workup in the ED revealed patient had leukocytosis of 21.73, H/H of 9.7/30.6, renal impairment with Cr/GFR 1.4/39, and CT chest/abdomen/pelvis revealing multiple pulmonary nodules as well as multiple hepatic lesions concerning for underlying malignancy/neoplasm.  Lumbar x-ray in setting of lower back pain negative for acute fractures.  Patient denies history of smoking, alcohol abuse, or exposure to chemicals/pesticides however did report strong family history on extended  family with her cousins having breast/ cancers. Cancer screening reported to be up-to-date and negative. Patient admitted to Hospital Medicine inpatient for continue malignancy workup while awaiting further evaluation/recommendations from Hematology/Oncology.       PCP: Irma oR

## 2024-01-24 NOTE — PROGRESS NOTES
[facility] Medicine  Progress Note    Patient Name: Tamy Allan  MRN: [id number]  Patient Class: IP- Inpatient   Admission Date: 1/22/2024  Length of Stay: 2 days  Attending Physician: Kyleigh Moore,*  Primary Care Provider: Irma Ro MD        Subjective:     Principal Problem:Abdominal pain        HPI:  Tamy Allan is a 78 y.o. female with a PMH  has a past medical history of Acquired hypothyroidism, Amblyopia, Arthritis, Benign hypertension, Cardiomegaly, Cataract, Diabetes mellitus, type 2, Encounter for blood transfusion, Encounter for general adult medical examination without abnormal findings (08/31/2016), Hyperlipidemia, Hyperlipidemia, Hypertension, Refractive error, Type 2 diabetes mellitus, and Vitamin D deficiency. who presented to the ED for further evaluation of worsening abdominal pain greatest in her left lower quadrant radiating to her back x3 weeks duration.  Pain is described as sharp/stabbing in nature, constant, radiating to her back and currently rated 4/10 in severity however was 10/10 in severity at its worst.  She reported no known alleviating factors with aggravating factors including movement and palpation to the affected area.  Associated symptoms included decreased p.o. intake over the past 3 weeks, unknown unintentional weight loss, increased daytime sleepiness, nausea and vomiting, and diarrhea which began earlier today.  She denied endorsing any lightheadedness, dizziness, headache, visual changes, fever, chills, sweats, chest pain, shortness of breath, dysuria, hematuria, melena, hematochezia, or onset neurological deficits.  Patient reported recently being diagnosed with UTI but did not finish her antibiotics. Patient's son also reported she has been confused and not moved around with a past week.  Initial workup in the ED revealed patient had leukocytosis of 21.73, H/H of 9.7/30.6, renal impairment with Cr/GFR 1.4/39, and CT  chest/abdomen/pelvis revealing multiple pulmonary nodules as well as multiple hepatic lesions concerning for underlying malignancy/neoplasm.  Lumbar x-ray in setting of lower back pain negative for acute fractures.  Patient denies history of smoking, alcohol abuse, or exposure to chemicals/pesticides however did report strong family history on extended family with her cousins having breast/ cancers. Cancer screening reported to be up-to-date and negative. Patient admitted to Hospital Medicine inpatient for continue malignancy workup while awaiting further evaluation/recommendations from Hematology/Oncology.       PCP: Irma Ro      Overview/Hospital Course:  Tamy Allan is a 78 y.o. female with a PMH  has a past medical history of Acquired hypothyroidism, Amblyopia, Arthritis, Benign hypertension, Cardiomegaly, Cataract, Diabetes mellitus, type 2, Encounter for blood transfusion, Encounter for general adult medical examination without abnormal findings (08/31/2016), Hyperlipidemia, Hyperlipidemia, Hypertension, Refractive error, Type 2 diabetes mellitus, and Vitamin D deficiency. who presented to the ED for further evaluation of worsening abdominal pain greatest in her left lower quadrant radiating to her back x3 weeks duration.    Associated symptoms included decreased p.o. intake over the past 3 weeks, unknown unintentional weight loss, increased daytime sleepiness, nausea and vomiting, and diarrhea which began earlier today.  Patient reported recently being diagnosed with UTI but did not finish her antibiotics. Patient's son also reported she has been confused and not moved around with a past week.  CT chest/abdomen/pelvis revealing multiple pulmonary nodules as well as multiple hepatic lesions concerning for underlying malignancy/neoplasm.  Lumbar x-ray in setting of lower back pain negative for acute fractures.  Patient denies history of smoking, alcohol abuse, or exposure to  chemicals/pesticides however did report strong family history on extended family with her cousins having breast/ cancers. Cancer screening reported to be up-to-date and negative. Patient admitted to Hospital Medicine inpatient for continue malignancy workup with Heme-Onc follow up   Heme-Onc evaluated and recommended IR guided biopsy.  Appreciate Heme-Onc inputs;  Discussed with intervention radiologist Dr. Gaytan- recommended to continue to hold aspirin, Lovenox/blood thinners, plans for possible IR guided biopsy on Thursday, 01/25/2024,;   On 09/24/2024, examination done at bedside, alert and oriented x3, resting comfortably.  Blood pressure on soft readings, we will follow up on drop, BNP, echo.  We will hold blood pressure medications at this time.    Follow up on chest x-ray.  IR planning for biopsy tomorrow/01/25/2024, NPO after midnight.            Review of Systems  Objective:     Vital Signs (Most Recent):  Temp: 99 °F (37.2 °C) (01/24/24 1124)  Pulse: 84 (01/24/24 1124)  Resp: 16 (01/24/24 1124)  BP: (!) 102/51 (01/24/24 1124)  SpO2: (!) 92 % (01/24/24 1124) Vital Signs (24h Range):  Temp:  [97.2 °F (36.2 °C)-99.4 °F (37.4 °C)] 99 °F (37.2 °C)  Pulse:  [82-88] 84  Resp:  [16-18] 16  SpO2:  [90 %-93 %] 92 %  BP: (102-119)/(51-69) 102/51     Weight: 72.6 kg (160 lb)  Body mass index is 30.23 kg/m².    Intake/Output Summary (Last 24 hours) at 1/24/2024 1322  Last data filed at 1/24/2024 0402  Gross per 24 hour   Intake 201.46 ml   Output --   Net 201.46 ml         Physical Exam        Constitutional:       General: She is not in acute distress.     Appearance: Normal appearance. She is normal weight. She is not ill-appearing, toxic-appearing or diaphoretic.   HENT:      Head: Normocephalic and atraumatic.      Right Ear: External ear normal.      Left Ear: External ear normal.      Nose: Nose normal. No congestion or rhinorrhea.      Mouth/Throat:      Mouth: Mucous membranes are moist.      Pharynx:  Oropharynx is clear. No oropharyngeal exudate or posterior oropharyngeal erythema.   Eyes:      General: No scleral icterus.     Extraocular Movements: Extraocular movements intact.      Conjunctiva/sclera: Conjunctivae normal.      Pupils: Pupils are equal, round, and reactive to light.   Neck:      Vascular: No carotid bruit.   Cardiovascular:      Rate and Rhythm: Normal rate and regular rhythm.      Pulses: Normal pulses.      Heart sounds: Normal heart sounds. No murmur heard.     No friction rub. No gallop.   Pulmonary:      Effort: Pulmonary effort is normal. No respiratory distress.      Breath sounds: Normal breath sounds. No stridor. No wheezing, rhonchi or rales.   Chest:      Chest wall: No tenderness.   Abdominal:      General: Abdomen is flat. Bowel sounds are normal. There is no distension.      Palpations: Abdomen is soft.      Tenderness: There is abdominal tenderness. There is no right CVA tenderness, left CVA tenderness, guarding or rebound.      Hernia: No hernia is present.      Comments: Mild TTP throughout without evidence of guarding or rebound tenderness noted   Musculoskeletal:         General: No swelling, tenderness, deformity or signs of injury. Normal range of motion.      Cervical back: Normal range of motion and neck supple. No rigidity or tenderness.      Right lower leg: No edema.      Left lower leg: No edema.   Lymphadenopathy:      Cervical: No cervical adenopathy.   Skin:     General: Skin is warm and dry.      Capillary Refill: Capillary refill takes less than 2 seconds.      Coloration: Skin is not jaundiced or pale.      Findings: No bruising, erythema, lesion or rash.   Neurological:      General: No focal deficit present.      Mental Status: She is alert and oriented to person, place, and time. Mental status is at baseline.      Cranial Nerves: No cranial nerve deficit.      Sensory: No sensory deficit.      Motor: No weakness.      Coordination: Coordination normal.    Psychiatric:         Mood and Affect: Mood normal.         Behavior: Behavior normal.         Thought Content: Thought content normal.         Judgment: Judgment normal.     Significant Labs: All pertinent labs within the past 24 hours have been reviewed.  CBC:   Recent Labs   Lab 01/22/24  1605 01/23/24  0430 01/24/24  0428   WBC 21.73* 19.46* 19.00*   HGB 9.7* 8.8* 8.8*   HCT 30.6* 27.9* 27.9*    402 426     CMP:   Recent Labs   Lab 01/22/24  1605 01/23/24  0430 01/24/24  0428    144 142   K 3.6 3.7 3.7    113* 112*   CO2 25 26 24   * 92 95   BUN 32* 33* 35*   CREATININE 1.4 1.4 1.7*   CALCIUM 8.8 8.3* 8.0*   PROT 6.2 5.6* 5.7*   ALBUMIN 2.0* 1.8* 1.8*   BILITOT 0.6 0.5 0.4   ALKPHOS 347* 318* 401*   AST 52* 42* 45*   ALT 14 13 13   ANIONGAP 8 5* 6*       Significant Imaging:     Imaging Results              X-Ray Lumbar Spine Ap And Lateral (Final result)  Result time 01/23/24 02:05:07      Final result by Lisbeth Soria MD (01/23/24 02:05:07)                   Impression:      No acute fracture.      Electronically signed by: Lisbeth Soria  Date:    01/23/2024  Time:    02:05               Narrative:    EXAMINATION:  XR LUMBAR SPINE AP AND LATERAL    CLINICAL HISTORY:  lumbar back pain;    TECHNIQUE:  AP, lateral and spot images were performed of the lumbar spine.    COMPARISON:  None    FINDINGS:  No acute fracture.  Minimal anterolisthesis at L4-5.  Moderate to advanced diffuse multilevel discogenic disease.  Advanced lower lumbar facet arthrosis.                                        CT Chest Without Contrast (Final result)  Result time 01/22/24 20:31:26      Final result by Ayush Moss MD (01/22/24 20:31:26)                   Impression:      Innumerable pulmonary nodules primarily concerning for metastatic disease.  Evaluation of number and size is limited by motion.    Small bilateral pleural effusions.    Trace pericardial effusion.    Complete findings as  above.    This report was flagged in Epic as abnormal.    All CT scans at this facility are performed  using dose modulation techniques as appropriate to performed exam including the following:  automated exposure control; adjustment of mA and/or kV according to the patients size (this includes techniques or standardized protocols for targeted exams where dose is matched to indication/reason for exam: i.e. extremities or head);  iterative reconstruction technique.      Electronically signed by: Ayush Moss  Date:    01/22/2024  Time:    20:31               Narrative:    EXAMINATION:  CT CHEST WITHOUT CONTRAST    CLINICAL HISTORY:  Lymphadenopathy, chest or axilla;    TECHNIQUE:  Low dose axial images, sagittal and coronal reformations were obtained from the thoracic inlet to the lung bases. Contrast was not administered.    COMPARISON:  None    FINDINGS:  Base of Neck: No significant abnormality.    Thoracic soft tissues: Normal.    Aorta: Left-sided aortic arch.  No aneurysm and no significant atherosclerosis    Heart: Trace pericardial effusion.  Normal size.    Pulmonary vasculature: Pulmonary arteries distribute normally.  There are four pulmonary veins.    Rylee/Mediastinum: No pathologic mauri enlargement.    Airways: Patent.    Lungs/Pleura: Small bilateral pleural effusions.  Innumerable small nodular opacities throughout the lungs.  Motion severely degrades fine parenchymal evaluation and accurate determination of number and size of nodules.  These would be presumed neoplastic and metastatic until proven otherwise.    Esophagus: Normal.    Bones: No acute fracture. No suspicious lytic or sclerotic lesions.  Osseous degenerative changes.                                        CT Abdomen Pelvis  Without Contrast (Final result)  Result time 01/22/24 18:34:22      Final result by Ayush Moss MD (01/22/24 18:34:22)                   Impression:      Innumerable hepatic lesions consistent with metastatic  disease.  Additional retroperitoneal adenopathy.    At least small bilateral pleural effusions. Increased interstitial attenuation. Innumerable nodular pulmonary opacities in the lung bases favored related to neoplasm.    Small volume abdominopelvic ascites.    Complete findings as above.    This report was flagged in Epic as abnormal.    All CT scans at this facility are performed  using dose modulation techniques as appropriate to performed exam including the following:  automated exposure control; adjustment of mA and/or kV according to the patients size (this includes techniques or standardized protocols for targeted exams where dose is matched to indication/reason for exam: i.e. extremities or head);  iterative reconstruction technique.      Electronically signed by: Ayush Moss  Date:    01/22/2024  Time:    18:34               Narrative:    EXAMINATION:  CT ABDOMEN PELVIS WITHOUT CONTRAST    CLINICAL HISTORY:  Abdominal pain, acute, nonlocalized;    TECHNIQUE:  Low dose axial images, sagittal and coronal reformations were obtained from the lung bases to the pubic symphysis.  Contrast was not administered.    COMPARISON:  None    FINDINGS:  Heart: Normal in size. No pericardial effusion.    Lung Bases: At least small bilateral pleural effusions.  Increased interstitial attenuation.  Innumerable nodular pulmonary opacities in the lung bases favored related to neoplasm    Liver: Multiple hepatic hypodensities most concerning for neoplasm.    Gallbladder: Irregularity along the gallbladder lumen possibly stones.    Bile Ducts: No evidence of dilated ducts.    Pancreas: No mass or peripancreatic fat stranding.    Spleen: Unremarkable.    Adrenals: Unremarkable.    Kidneys/ Ureters: Multiple renal hypodensities not all fully characterized but favoring simple cysts.  No hydronephrosis.  Nonobstructive left nephrolithiasis.    Bladder: No evidence of wall thickening.    Reproductive organs: Uterus is surgically  absent.    GI Tract/Mesentery: Diverticulosis without diverticulitis.  No evidence of appendicitis.    Peritoneal Space: Small volume abdominopelvic ascites.  No free air.    Retroperitoneum: Retroperitoneal adenopathy.    Abdominal wall: Unremarkable.    Vasculature: No significant atherosclerosis or aneurysm.    Bones: No acute fracture.  Osseous degenerative changes.                                       Assessment/Plan:      * Abdominal pain        CKD (chronic kidney disease) stage 3, GFR 30-59 ml/min  Creatine stable for now. BMP reviewed- noted Estimated Creatinine Clearance: 30.7 mL/min (based on SCr of 1.4 mg/dL). according to latest data. Based on current GFR, CKD stage is stage 3 - GFR 30-59.  Monitor UOP and serial BMP and adjust therapy as needed. Renally dose meds. Avoid nephrotoxic medications and procedures.      Metastatic disease  Patient has evidence of metastatic cancer of unknown primary. The cancer has metastasized to liver and lungs.CT chest/abdomen/pelvis revealed evidence of numerous pulmonary nodules as well as numerous hepatic lesions.  Patient reports being up-to-date on cancer screenings. The patient is not under the care of an outpatient oncologist. The patient is not undergoing active chemotherapy. Their staging information is listed below.   Cancer Staging   No matching staging information was found for the patient.  Plan:  -CEA, AFP,   -f/u hematology/oncology       Anemia in chronic kidney disease  Patient's anemia is currently  lower than last reported baseline of 11.4/35.4 . Has not received any PRBCs to date. Etiology likely d/t Iron deficiency and chronic disease due to Chronic Kidney Disease/ESRD in addition to underlying malignancy.   Current CBC reviewed-   Lab Results   Component Value Date    HGB 9.7 (L) 01/22/2024    HCT 30.6 (L) 01/22/2024     Monitor serial CBC and transfuse if patient becomes hemodynamically unstable, symptomatic or H/H drops below  7/21.      Gout  Not in acute flare.   Plan:  -continue to monitor       Hypothyroidism  Patient has chronic hypothyroidism. TFTs reviewed-   Lab Results   Component Value Date    TSH 0.688 10/03/2023   Plan:  -Will continue chronic levothyroxine and adjust for and clinical changes      Essential hypertension  Chronic, controlled. Latest blood pressure and vitals reviewed-     Temp:  [98.8 °F (37.1 °C)-98.9 °F (37.2 °C)]   Pulse:  [88-92]   Resp:  [18-20]   BP: (124-133)/(60-76)   SpO2:  [96 %-97 %] .   Home meds for hypertension were reviewed and noted below.   Hypertension Medications               amLODIPine (NORVASC) 5 MG tablet TAKE 1 TABLET ONE TIME DAILY    carvediloL (COREG) 25 MG tablet TAKE 1 TABLET TWICE DAILY WITH MEALS    hydrALAZINE (APRESOLINE) 10 MG tablet TAKE 1 TABLET THREE TIMES DAILY    losartan (COZAAR) 100 MG tablet TAKE 1 TABLET ONE TIME DAILY    nitroGLYCERIN (NITROSTAT) 0.3 MG SL tablet           While in the hospital, will manage blood pressure as follows; Continue home antihypertensive regimen    Will utilize p.r.n. blood pressure medication only if patient's blood pressure greater than 160/100 and she develops symptoms such as worsening chest pain or shortness of breath.      Coronary artery disease involving native coronary artery of native heart without angina pectoris  Patient with known CAD which is controlled Will continue ASA, Plavix, and Statin and monitor for S/Sx of angina/ACS. Continue to monitor on telemetry.       Hyperlipidemia  Patient is chronically on statin.will continue for now. Last Lipid Panel:   Lab Results   Component Value Date    CHOL 111 10/03/2023    HDL 29 (L) 10/03/2023    LDLCALC 54 10/03/2023    TRIG 138 10/03/2023   Plan:  -Continue home medication  -low fat/low calorie diet      Diabetes mellitus  Patient's FSGs are controlled on current medication regimen.  Last A1c reviewed-   Lab Results   Component Value Date    LABA1C 5.9 07/02/2020    HGBA1C 6.3 (H)  "01/05/2024     Most recent fingerstick glucose reviewed- No results for input(s): "POCTGLUCOSE" in the last 24 hours.  Current correctional scale  Low  titrate as needed  anti-hyperglycemic dose as follows-   Antihyperglycemics (From admission, onward)      Start     Stop Route Frequency Ordered    01/22/24 2126  insulin aspart U-100 pen 0-5 Units         -- SubQ Before meals & nightly PRN 01/22/24 2027        Plan:  -SSI  -Accu-checks  -Hold oral hypoglycemics while patient is in the hospital  -Hypoglycemic protocol         VTE Risk Mitigation (From admission, onward)           Ordered     Place sequential compression device  Until discontinued         01/23/24 0830     IP VTE HIGH RISK PATIENT  Once         01/22/24 2027     Place sequential compression device  Until discontinued         01/22/24 2027                    Discharge Planning   NARDA:      Code Status: Full Code   Is the patient medically ready for discharge?:     Reason for patient still in hospital (select all that apply): Patient trending condition, Consult recommendations, and Pending disposition                     Kyleigh Moore MD  Department of Hospital Medicine   [facility] - Telemetry ([facility])    "

## 2024-01-24 NOTE — PLAN OF CARE
Patient remained free from falls throughout shift, call bell within reach. Zofran and phenergan given for nausea. Zosyn continued. Vitals stable.

## 2024-01-24 NOTE — CHAPLAIN
Initial visit with patient.  Visited with patient to assess for spiritual and emotional needs.  Pt was doing okay at the time of my visit but did ask for prayer.  I took time to do this for her and spiritual care remains available as needed.    Chaplain Maximiliano Thapa M.Div., [facility]

## 2024-01-24 NOTE — CONSULTS
Chart reviewed by Dr. Gaytan.       ASSESSMENT/PLAN:    Liver masses    The order for a CT biopsy of liver mass has been placed and the procedure will be performed Thursday.  Please have NPO Wednesday nite.          Thank you for the consult.

## 2024-01-24 NOTE — SUBJECTIVE & OBJECTIVE
Review of Systems  Objective:     Vital Signs (Most Recent):  Temp: 99 °F (37.2 °C) (01/24/24 1124)  Pulse: 84 (01/24/24 1124)  Resp: 16 (01/24/24 1124)  BP: (!) 102/51 (01/24/24 1124)  SpO2: (!) 92 % (01/24/24 1124) Vital Signs (24h Range):  Temp:  [97.2 °F (36.2 °C)-99.4 °F (37.4 °C)] 99 °F (37.2 °C)  Pulse:  [82-88] 84  Resp:  [16-18] 16  SpO2:  [90 %-93 %] 92 %  BP: (102-119)/(51-69) 102/51     Weight: 72.6 kg (160 lb)  Body mass index is 30.23 kg/m².    Intake/Output Summary (Last 24 hours) at 1/24/2024 1322  Last data filed at 1/24/2024 0402  Gross per 24 hour   Intake 201.46 ml   Output --   Net 201.46 ml         Physical Exam        Constitutional:       General: She is not in acute distress.     Appearance: Normal appearance. She is normal weight. She is not ill-appearing, toxic-appearing or diaphoretic.   HENT:      Head: Normocephalic and atraumatic.      Right Ear: External ear normal.      Left Ear: External ear normal.      Nose: Nose normal. No congestion or rhinorrhea.      Mouth/Throat:      Mouth: Mucous membranes are moist.      Pharynx: Oropharynx is clear. No oropharyngeal exudate or posterior oropharyngeal erythema.   Eyes:      General: No scleral icterus.     Extraocular Movements: Extraocular movements intact.      Conjunctiva/sclera: Conjunctivae normal.      Pupils: Pupils are equal, round, and reactive to light.   Neck:      Vascular: No carotid bruit.   Cardiovascular:      Rate and Rhythm: Normal rate and regular rhythm.      Pulses: Normal pulses.      Heart sounds: Normal heart sounds. No murmur heard.     No friction rub. No gallop.   Pulmonary:      Effort: Pulmonary effort is normal. No respiratory distress.      Breath sounds: Normal breath sounds. No stridor. No wheezing, rhonchi or rales.   Chest:      Chest wall: No tenderness.   Abdominal:      General: Abdomen is flat. Bowel sounds are normal. There is no distension.      Palpations: Abdomen is soft.      Tenderness:  There is abdominal tenderness. There is no right CVA tenderness, left CVA tenderness, guarding or rebound.      Hernia: No hernia is present.      Comments: Mild TTP throughout without evidence of guarding or rebound tenderness noted   Musculoskeletal:         General: No swelling, tenderness, deformity or signs of injury. Normal range of motion.      Cervical back: Normal range of motion and neck supple. No rigidity or tenderness.      Right lower leg: No edema.      Left lower leg: No edema.   Lymphadenopathy:      Cervical: No cervical adenopathy.   Skin:     General: Skin is warm and dry.      Capillary Refill: Capillary refill takes less than 2 seconds.      Coloration: Skin is not jaundiced or pale.      Findings: No bruising, erythema, lesion or rash.   Neurological:      General: No focal deficit present.      Mental Status: She is alert and oriented to person, place, and time. Mental status is at baseline.      Cranial Nerves: No cranial nerve deficit.      Sensory: No sensory deficit.      Motor: No weakness.      Coordination: Coordination normal.   Psychiatric:         Mood and Affect: Mood normal.         Behavior: Behavior normal.         Thought Content: Thought content normal.         Judgment: Judgment normal.     Significant Labs: All pertinent labs within the past 24 hours have been reviewed.  CBC:   Recent Labs   Lab 01/22/24  1605 01/23/24  0430 01/24/24  0428   WBC 21.73* 19.46* 19.00*   HGB 9.7* 8.8* 8.8*   HCT 30.6* 27.9* 27.9*    402 426     CMP:   Recent Labs   Lab 01/22/24  1605 01/23/24  0430 01/24/24  0428    144 142   K 3.6 3.7 3.7    113* 112*   CO2 25 26 24   * 92 95   BUN 32* 33* 35*   CREATININE 1.4 1.4 1.7*   CALCIUM 8.8 8.3* 8.0*   PROT 6.2 5.6* 5.7*   ALBUMIN 2.0* 1.8* 1.8*   BILITOT 0.6 0.5 0.4   ALKPHOS 347* 318* 401*   AST 52* 42* 45*   ALT 14 13 13   ANIONGAP 8 5* 6*       Significant Imaging:     Imaging Results              X-Ray Lumbar Spine Ap  And Lateral (Final result)  Result time 01/23/24 02:05:07      Final result by Lisbeth Soria MD (01/23/24 02:05:07)                   Impression:      No acute fracture.      Electronically signed by: Lisbeth Soria  Date:    01/23/2024  Time:    02:05               Narrative:    EXAMINATION:  XR LUMBAR SPINE AP AND LATERAL    CLINICAL HISTORY:  lumbar back pain;    TECHNIQUE:  AP, lateral and spot images were performed of the lumbar spine.    COMPARISON:  None    FINDINGS:  No acute fracture.  Minimal anterolisthesis at L4-5.  Moderate to advanced diffuse multilevel discogenic disease.  Advanced lower lumbar facet arthrosis.                                        CT Chest Without Contrast (Final result)  Result time 01/22/24 20:31:26      Final result by Ayush Moss MD (01/22/24 20:31:26)                   Impression:      Innumerable pulmonary nodules primarily concerning for metastatic disease.  Evaluation of number and size is limited by motion.    Small bilateral pleural effusions.    Trace pericardial effusion.    Complete findings as above.    This report was flagged in Epic as abnormal.    All CT scans at this facility are performed  using dose modulation techniques as appropriate to performed exam including the following:  automated exposure control; adjustment of mA and/or kV according to the patients size (this includes techniques or standardized protocols for targeted exams where dose is matched to indication/reason for exam: i.e. extremities or head);  iterative reconstruction technique.      Electronically signed by: Ayush Moss  Date:    01/22/2024  Time:    20:31               Narrative:    EXAMINATION:  CT CHEST WITHOUT CONTRAST    CLINICAL HISTORY:  Lymphadenopathy, chest or axilla;    TECHNIQUE:  Low dose axial images, sagittal and coronal reformations were obtained from the thoracic inlet to the lung bases. Contrast was not  administered.    COMPARISON:  None    FINDINGS:  Base of Neck: No significant abnormality.    Thoracic soft tissues: Normal.    Aorta: Left-sided aortic arch.  No aneurysm and no significant atherosclerosis    Heart: Trace pericardial effusion.  Normal size.    Pulmonary vasculature: Pulmonary arteries distribute normally.  There are four pulmonary veins.    Rylee/Mediastinum: No pathologic mauri enlargement.    Airways: Patent.    Lungs/Pleura: Small bilateral pleural effusions.  Innumerable small nodular opacities throughout the lungs.  Motion severely degrades fine parenchymal evaluation and accurate determination of number and size of nodules.  These would be presumed neoplastic and metastatic until proven otherwise.    Esophagus: Normal.    Bones: No acute fracture. No suspicious lytic or sclerotic lesions.  Osseous degenerative changes.                                        CT Abdomen Pelvis  Without Contrast (Final result)  Result time 01/22/24 18:34:22      Final result by Ayush Moss MD (01/22/24 18:34:22)                   Impression:      Innumerable hepatic lesions consistent with metastatic disease.  Additional retroperitoneal adenopathy.    At least small bilateral pleural effusions. Increased interstitial attenuation. Innumerable nodular pulmonary opacities in the lung bases favored related to neoplasm.    Small volume abdominopelvic ascites.    Complete findings as above.    This report was flagged in Epic as abnormal.    All CT scans at this facility are performed  using dose modulation techniques as appropriate to performed exam including the following:  automated exposure control; adjustment of mA and/or kV according to the patients size (this includes techniques or standardized protocols for targeted exams where dose is matched to indication/reason for exam: i.e. extremities or head);  iterative reconstruction technique.      Electronically signed by: Ayush  Isa  Date:    01/22/2024  Time:    18:34               Narrative:    EXAMINATION:  CT ABDOMEN PELVIS WITHOUT CONTRAST    CLINICAL HISTORY:  Abdominal pain, acute, nonlocalized;    TECHNIQUE:  Low dose axial images, sagittal and coronal reformations were obtained from the lung bases to the pubic symphysis.  Contrast was not administered.    COMPARISON:  None    FINDINGS:  Heart: Normal in size. No pericardial effusion.    Lung Bases: At least small bilateral pleural effusions.  Increased interstitial attenuation.  Innumerable nodular pulmonary opacities in the lung bases favored related to neoplasm    Liver: Multiple hepatic hypodensities most concerning for neoplasm.    Gallbladder: Irregularity along the gallbladder lumen possibly stones.    Bile Ducts: No evidence of dilated ducts.    Pancreas: No mass or peripancreatic fat stranding.    Spleen: Unremarkable.    Adrenals: Unremarkable.    Kidneys/ Ureters: Multiple renal hypodensities not all fully characterized but favoring simple cysts.  No hydronephrosis.  Nonobstructive left nephrolithiasis.    Bladder: No evidence of wall thickening.    Reproductive organs: Uterus is surgically absent.    GI Tract/Mesentery: Diverticulosis without diverticulitis.  No evidence of appendicitis.    Peritoneal Space: Small volume abdominopelvic ascites.  No free air.    Retroperitoneum: Retroperitoneal adenopathy.    Abdominal wall: Unremarkable.    Vasculature: No significant atherosclerosis or aneurysm.    Bones: No acute fracture.  Osseous degenerative changes.

## 2024-01-25 NOTE — PLAN OF CARE
Problem: Adult Inpatient Plan of Care  Goal: Plan of Care Review  Outcome: Ongoing, Progressing  Goal: Patient-Specific Goal (Individualized)  Outcome: Ongoing, Progressing  Goal: Absence of Hospital-Acquired Illness or Injury  Outcome: Ongoing, Progressing  Goal: Optimal Comfort and Wellbeing  Outcome: Ongoing, Progressing  Goal: Readiness for Transition of Care  Outcome: Ongoing, Progressing     Problem: Diabetes Comorbidity  Goal: Blood Glucose Level Within Targeted Range  Outcome: Ongoing, Progressing     Problem: Fall Injury Risk  Goal: Absence of Fall and Fall-Related Injury  Outcome: Ongoing, Progressing     Echo completed. NS infusing at 50ml/hr.  NPO at midnight 1/25. Blood pressure meds on hold. Chest xray completed.

## 2024-01-25 NOTE — OP NOTE
Pre Op Diagnosis: liver masses     Post Op Diagnosis: same     Procedure:  biopsy     Procedure performed by: Lucila MILLARD, Gregory ANDERSON     Written Informed Consent Obtained: Yes     Specimen Removed:  yes     Estimated Blood Loss:  minimal     Findings: Local anesthesia     Sedation:  yes     The patient tolerated the procedure well and there were no complications.      Disposition:  F/U in clinic or with ordering physician    Discharge instructions:  Light activity for 24 hours.  Remove band aid in 24 hours.  No baths (showers are appropriate).      Sterile technique was performed in the RUQ, lidocaine was used as a local anesthetic.  Multiple samples taken percutaneously from the liver mass.  Pt tolerated the procedure well without immediate complications.  Please see radiologist report for details. F/u with PCP and/or ordering physician.

## 2024-01-25 NOTE — PLAN OF CARE
A245/A245 EDIECandi Allan is a 78 y.o.female admitted on 1/22/2024 for Abdominal pain   Code Status: Full Code MRN: [id number]   Review of patient's allergies indicates:   Allergen Reactions    Aspirin     Ditropan [oxybutynin chloride]      itching    Iodine and iodide containing products     Iodinated contrast media Other (See Comments)     Unknown reaction per patient     Past Medical History:   Diagnosis Date    Acquired hypothyroidism     Amblyopia     OD    Arthritis     Benign hypertension     Cardiomegaly     Cataract     Diabetes mellitus, type 2     Encounter for blood transfusion     Encounter for general adult medical examination without abnormal findings 08/31/2016    Hyperlipidemia     Hyperlipidemia     Hypertension     Refractive error     Type 2 diabetes mellitus     Vitamin D deficiency       PRN meds    acetaminophen, 650 mg, Q6H PRN  acetaminophen, 650 mg, Q8H PRN  albuterol-ipratropium, 3 mL, Q6H PRN  aluminum-magnesium hydroxide-simethicone, 30 mL, QID PRN  dextrose 10%, 12.5 g, PRN  dextrose 10%, 25 g, PRN  glucagon (human recombinant), 1 mg, PRN  glucose, 16 g, PRN  glucose, 24 g, PRN  HYDROcodone-acetaminophen, 1 tablet, Q6H PRN  insulin aspart U-100, 0-5 Units, QID (AC + HS) PRN  melatonin, 6 mg, Nightly PRN  morphine, 2 mg, Q4H PRN  naloxone, 0.02 mg, PRN  ondansetron, 4 mg, Q8H PRN  promethazine, 25 mg, Q6H PRN  senna-docusate 8.6-50 mg, 1 tablet, BID PRN  sodium chloride 0.9%, 10 mL, Q12H PRN      Chart check completed. Will continue plan of care.      Orientation: oriented x 4  [place] Coma Scale Score: 15                 Last Bowel Movement: 01/24/24  Diet NPO  Voiding Characteristics: hesitancy  Spike Score: 19  Fall Risk Score: 12  Accucheck []   Freq?      Lines/Drains/Airways       Peripheral Intravenous Line  Duration                  Peripheral IV - Single Lumen 01/24/24 1435 22 G Anterior;Distal;Right Forearm <1 day

## 2024-01-25 NOTE — INTERVAL H&P NOTE
The patient has been examined and the H&P has been reviewed:    I concur with the findings and no changes have occurred since H&P was written.        Active Hospital Problems    Diagnosis  POA    *Abdominal pain [R10.9]  Yes    Metastatic disease [C79.9]  Yes    CKD (chronic kidney disease) stage 3, GFR 30-59 ml/min [N18.30]  Yes    Anemia in chronic kidney disease [N18.9, D63.1]  Yes    Gout [M10.9]  Yes     right thumb      Hypothyroidism [E03.9]  Yes    Essential hypertension [I10]  Yes     Last Assessment & Plan:   Stable. Resume home antihypertensive regimen.      Coronary artery disease involving native coronary artery of native heart without angina pectoris [I25.10]  Yes     Last Assessment & Plan:   History of coronary artery disease with prior stent placement. Followed in the outpatient setting by her cardiologist, Dr. Pisano.  EKG shows a right bundle branch block with inferior Q waves. Unchanged when compared to a prior tracing obtained in August 2017. Continue aspirin, statin, and beta-blocker therapy at discharge.       Hyperlipidemia [E78.5]  Yes     chol at goal, ldl 53      Diabetes mellitus [E11.9]  Yes      Resolved Hospital Problems   No resolved problems to display.

## 2024-01-25 NOTE — SUBJECTIVE & OBJECTIVE
Interval History:     No acute events overnight   Afebrile, leukocytosis slightly trended down, elevated procalcitonin, chest x-ray with findings suggestive of pneumonia, adjusted antibiotic regimen, follow up with ID.    Complaining of diarrhea, we will follow up on stool studies,; currently on IV fluids.    Hypokalemia, ordered replacement   Creatinine slightly trended down       Review of Systems  Objective:     Vital Signs (Most Recent):  Temp: 98.1 °F (36.7 °C) (01/25/24 1135)  Pulse: 80 (01/25/24 1135)  Resp: 20 (01/25/24 1135)  BP: (!) 121/57 (01/25/24 1135)  SpO2: 98 % (01/25/24 1135) Vital Signs (24h Range):  Temp:  [98.1 °F (36.7 °C)-99.4 °F (37.4 °C)] 98.1 °F (36.7 °C)  Pulse:  [80-90] 80  Resp:  [16-20] 20  SpO2:  [93 %-98 %] 98 %  BP: (107-127)/(54-60) 121/57     Weight: 72.6 kg (160 lb)  Body mass index is 30.23 kg/m².  No intake or output data in the 24 hours ending 01/25/24 1240      Physical Exam        Constitutional:       General: She is not in acute distress.     Appearance: Normal appearance. She is normal weight. She is not ill-appearing, toxic-appearing or diaphoretic.   HENT:      Head: Normocephalic and atraumatic.      Right Ear: External ear normal.      Left Ear: External ear normal.      Nose: Nose normal. No congestion or rhinorrhea.      Mouth/Throat:      Mouth: Mucous membranes are moist.      Pharynx: Oropharynx is clear. No oropharyngeal exudate or posterior oropharyngeal erythema.   Eyes:      General: No scleral icterus.     Extraocular Movements: Extraocular movements intact.      Conjunctiva/sclera: Conjunctivae normal.      Pupils: Pupils are equal, round, and reactive to light.   Neck:      Vascular: No carotid bruit.   Cardiovascular:      Rate and Rhythm: Normal rate and regular rhythm.      Pulses: Normal pulses.      Heart sounds: Normal heart sounds. No murmur heard.     No friction rub. No gallop.   Pulmonary:      Effort: Pulmonary effort is normal. No respiratory  distress.      Breath sounds: Normal breath sounds. No stridor. No wheezing, rhonchi or rales.   Chest:      Chest wall: No tenderness.   Abdominal:      General: Abdomen is flat. Bowel sounds are normal. There is no distension.      Palpations: Abdomen is soft.      Tenderness: There is abdominal tenderness. There is no right CVA tenderness, left CVA tenderness, guarding or rebound.      Hernia: No hernia is present.      Comments: Mild TTP throughout without evidence of guarding or rebound tenderness noted   Musculoskeletal:         General: No swelling, tenderness, deformity or signs of injury. Normal range of motion.      Cervical back: Normal range of motion and neck supple. No rigidity or tenderness.      Right lower leg: No edema.      Left lower leg: No edema.   Lymphadenopathy:      Cervical: No cervical adenopathy.   Skin:     General: Skin is warm and dry.      Capillary Refill: Capillary refill takes less than 2 seconds.      Coloration: Skin is not jaundiced or pale.      Findings: No bruising, erythema, lesion or rash.   Neurological:      General: No focal deficit present.      Mental Status: She is alert and oriented to person, place, and time. Mental status is at baseline.      Cranial Nerves: No cranial nerve deficit.      Sensory: No sensory deficit.      Motor: No weakness.      Coordination: Coordination normal.   Psychiatric:         Mood and Affect: Mood normal.         Behavior: Behavior normal.         Thought Content: Thought content normal.         Judgment: Judgment normal.          Significant Labs: All pertinent labs within the past 24 hours have been reviewed.  CBC:   Recent Labs   Lab 01/24/24 0428 01/25/24  0519   WBC 19.00* 18.87*   HGB 8.8* 8.7*   HCT 27.9* 28.3*    466*     CMP:   Recent Labs   Lab 01/24/24 0428 01/25/24  0519    141   K 3.7 3.4*   * 112*   CO2 24 25   GLU 95 123*   BUN 35* 31*   CREATININE 1.7* 1.6*   CALCIUM 8.0* 7.8*   PROT 5.7* 5.8*    ALBUMIN 1.8* 1.8*   BILITOT 0.4 0.3   ALKPHOS 401* 414*   AST 45* 48*   ALT 13 15   ANIONGAP 6* 4*       Significant Imaging:     Imaging Results              X-Ray Lumbar Spine Ap And Lateral (Final result)  Result time 01/23/24 02:05:07      Final result by Lisbeth Soria MD (01/23/24 02:05:07)                   Impression:      No acute fracture.      Electronically signed by: Lisbeth Soria  Date:    01/23/2024  Time:    02:05               Narrative:    EXAMINATION:  XR LUMBAR SPINE AP AND LATERAL    CLINICAL HISTORY:  lumbar back pain;    TECHNIQUE:  AP, lateral and spot images were performed of the lumbar spine.    COMPARISON:  None    FINDINGS:  No acute fracture.  Minimal anterolisthesis at L4-5.  Moderate to advanced diffuse multilevel discogenic disease.  Advanced lower lumbar facet arthrosis.                                        CT Chest Without Contrast (Final result)  Result time 01/22/24 20:31:26      Final result by Ayush Moss MD (01/22/24 20:31:26)                   Impression:      Innumerable pulmonary nodules primarily concerning for metastatic disease.  Evaluation of number and size is limited by motion.    Small bilateral pleural effusions.    Trace pericardial effusion.    Complete findings as above.    This report was flagged in Epic as abnormal.    All CT scans at this facility are performed  using dose modulation techniques as appropriate to performed exam including the following:  automated exposure control; adjustment of mA and/or kV according to the patients size (this includes techniques or standardized protocols for targeted exams where dose is matched to indication/reason for exam: i.e. extremities or head);  iterative reconstruction technique.      Electronically signed by: Ayush Moss  Date:    01/22/2024  Time:    20:31               Narrative:    EXAMINATION:  CT CHEST WITHOUT CONTRAST    CLINICAL HISTORY:  Lymphadenopathy, chest or  axilla;    TECHNIQUE:  Low dose axial images, sagittal and coronal reformations were obtained from the thoracic inlet to the lung bases. Contrast was not administered.    COMPARISON:  None    FINDINGS:  Base of Neck: No significant abnormality.    Thoracic soft tissues: Normal.    Aorta: Left-sided aortic arch.  No aneurysm and no significant atherosclerosis    Heart: Trace pericardial effusion.  Normal size.    Pulmonary vasculature: Pulmonary arteries distribute normally.  There are four pulmonary veins.    Rylee/Mediastinum: No pathologic mauri enlargement.    Airways: Patent.    Lungs/Pleura: Small bilateral pleural effusions.  Innumerable small nodular opacities throughout the lungs.  Motion severely degrades fine parenchymal evaluation and accurate determination of number and size of nodules.  These would be presumed neoplastic and metastatic until proven otherwise.    Esophagus: Normal.    Bones: No acute fracture. No suspicious lytic or sclerotic lesions.  Osseous degenerative changes.                                        CT Abdomen Pelvis  Without Contrast (Final result)  Result time 01/22/24 18:34:22      Final result by Ayush Moss MD (01/22/24 18:34:22)                   Impression:      Innumerable hepatic lesions consistent with metastatic disease.  Additional retroperitoneal adenopathy.    At least small bilateral pleural effusions. Increased interstitial attenuation. Innumerable nodular pulmonary opacities in the lung bases favored related to neoplasm.    Small volume abdominopelvic ascites.    Complete findings as above.    This report was flagged in Epic as abnormal.    All CT scans at this facility are performed  using dose modulation techniques as appropriate to performed exam including the following:  automated exposure control; adjustment of mA and/or kV according to the patients size (this includes techniques or standardized protocols for targeted exams where dose is matched to  indication/reason for exam: i.e. extremities or head);  iterative reconstruction technique.      Electronically signed by: Ayush Isa  Date:    01/22/2024  Time:    18:34               Narrative:    EXAMINATION:  CT ABDOMEN PELVIS WITHOUT CONTRAST    CLINICAL HISTORY:  Abdominal pain, acute, nonlocalized;    TECHNIQUE:  Low dose axial images, sagittal and coronal reformations were obtained from the lung bases to the pubic symphysis.  Contrast was not administered.    COMPARISON:  None    FINDINGS:  Heart: Normal in size. No pericardial effusion.    Lung Bases: At least small bilateral pleural effusions.  Increased interstitial attenuation.  Innumerable nodular pulmonary opacities in the lung bases favored related to neoplasm    Liver: Multiple hepatic hypodensities most concerning for neoplasm.    Gallbladder: Irregularity along the gallbladder lumen possibly stones.    Bile Ducts: No evidence of dilated ducts.    Pancreas: No mass or peripancreatic fat stranding.    Spleen: Unremarkable.    Adrenals: Unremarkable.    Kidneys/ Ureters: Multiple renal hypodensities not all fully characterized but favoring simple cysts.  No hydronephrosis.  Nonobstructive left nephrolithiasis.    Bladder: No evidence of wall thickening.    Reproductive organs: Uterus is surgically absent.    GI Tract/Mesentery: Diverticulosis without diverticulitis.  No evidence of appendicitis.    Peritoneal Space: Small volume abdominopelvic ascites.  No free air.    Retroperitoneum: Retroperitoneal adenopathy.    Abdominal wall: Unremarkable.    Vasculature: No significant atherosclerosis or aneurysm.    Bones: No acute fracture.  Osseous degenerative changes.

## 2024-01-25 NOTE — PROGRESS NOTES
[facility] Medicine  Progress Note    Patient Name: Tamy Allan  MRN: [id number]  Patient Class: IP- Inpatient   Admission Date: 1/22/2024  Length of Stay: 3 days  Attending Physician: Kyleigh Moore,*  Primary Care Provider: Irma Ro MD        Subjective:     Principal Problem:Abdominal pain        HPI:  Tamy Allan is a 78 y.o. female with a PMH  has a past medical history of Acquired hypothyroidism, Amblyopia, Arthritis, Benign hypertension, Cardiomegaly, Cataract, Diabetes mellitus, type 2, Encounter for blood transfusion, Encounter for general adult medical examination without abnormal findings (08/31/2016), Hyperlipidemia, Hyperlipidemia, Hypertension, Refractive error, Type 2 diabetes mellitus, and Vitamin D deficiency. who presented to the ED for further evaluation of worsening abdominal pain greatest in her left lower quadrant radiating to her back x3 weeks duration.  Pain is described as sharp/stabbing in nature, constant, radiating to her back and currently rated 4/10 in severity however was 10/10 in severity at its worst.  She reported no known alleviating factors with aggravating factors including movement and palpation to the affected area.  Associated symptoms included decreased p.o. intake over the past 3 weeks, unknown unintentional weight loss, increased daytime sleepiness, nausea and vomiting, and diarrhea which began earlier today.  She denied endorsing any lightheadedness, dizziness, headache, visual changes, fever, chills, sweats, chest pain, shortness of breath, dysuria, hematuria, melena, hematochezia, or onset neurological deficits.  Patient reported recently being diagnosed with UTI but did not finish her antibiotics. Patient's son also reported she has been confused and not moved around with a past week.  Initial workup in the ED revealed patient had leukocytosis of 21.73, H/H of 9.7/30.6, renal impairment with Cr/GFR 1.4/39, and CT  chest/abdomen/pelvis revealing multiple pulmonary nodules as well as multiple hepatic lesions concerning for underlying malignancy/neoplasm.  Lumbar x-ray in setting of lower back pain negative for acute fractures.  Patient denies history of smoking, alcohol abuse, or exposure to chemicals/pesticides however did report strong family history on extended family with her cousins having breast/ cancers. Cancer screening reported to be up-to-date and negative. Patient admitted to Hospital Medicine inpatient for continue malignancy workup while awaiting further evaluation/recommendations from Hematology/Oncology.       PCP: Irma Ro      Overview/Hospital Course:  Tamy Allan is a 78 y.o. female with a PMH  has a past medical history of Acquired hypothyroidism, Amblyopia, Arthritis, Benign hypertension, Cardiomegaly, Cataract, Diabetes mellitus, type 2, Encounter for blood transfusion, Encounter for general adult medical examination without abnormal findings (08/31/2016), Hyperlipidemia, Hyperlipidemia, Hypertension, Refractive error, Type 2 diabetes mellitus, and Vitamin D deficiency. who presented to the ED for further evaluation of worsening abdominal pain greatest in her left lower quadrant radiating to her back x3 weeks duration.    Associated symptoms included decreased p.o. intake over the past 3 weeks, unknown unintentional weight loss, increased daytime sleepiness, nausea and vomiting, and diarrhea which began earlier today.  Patient reported recently being diagnosed with UTI but did not finish her antibiotics. Patient's son also reported she has been confused and not moved around with a past week.  CT chest/abdomen/pelvis revealing multiple pulmonary nodules as well as multiple hepatic lesions concerning for underlying malignancy/neoplasm.  Lumbar x-ray in setting of lower back pain negative for acute fractures.  Patient denies history of smoking, alcohol abuse, or exposure to  chemicals/pesticides however did report strong family history on extended family with her cousins having breast/ cancers. Cancer screening reported to be up-to-date and negative. Patient admitted to Hospital Medicine inpatient for continue malignancy workup with Heme-Onc follow up   Heme-Onc evaluated and recommended IR guided biopsy.  Appreciate Heme-Onc inputs;  Discussed with intervention radiologist Dr. Gaytan- recommended to continue to hold aspirin, Lovenox/blood thinners, plans for possible IR guided biopsy on Thursday, 01/25/2024,;   On 09/24/2024, examination done at bedside, alert and oriented x3, resting comfortably.  Blood pressure on soft readings, we will follow up on drop, BNP, echo.  We will hold blood pressure medications at this time.    Follow up on chest x-ray.  IR planning for biopsy 01/25/2024, NPO after midnight.        Interval History:     No acute events overnight   Afebrile, leukocytosis slightly trended down, elevated procalcitonin, chest x-ray with findings suggestive of pneumonia, adjusted antibiotic regimen, follow up with ID.    Complaining of diarrhea, we will follow up on stool studies,; currently on IV fluids.    Hypokalemia, ordered replacement   Creatinine slightly trended down       Review of Systems  Objective:     Vital Signs (Most Recent):  Temp: 98.1 °F (36.7 °C) (01/25/24 1135)  Pulse: 80 (01/25/24 1135)  Resp: 20 (01/25/24 1135)  BP: (!) 121/57 (01/25/24 1135)  SpO2: 98 % (01/25/24 1135) Vital Signs (24h Range):  Temp:  [98.1 °F (36.7 °C)-99.4 °F (37.4 °C)] 98.1 °F (36.7 °C)  Pulse:  [80-90] 80  Resp:  [16-20] 20  SpO2:  [93 %-98 %] 98 %  BP: (107-127)/(54-60) 121/57     Weight: 72.6 kg (160 lb)  Body mass index is 30.23 kg/m².  No intake or output data in the 24 hours ending 01/25/24 1240      Physical Exam        Constitutional:       General: She is not in acute distress.     Appearance: Normal appearance. She is normal weight. She is not ill-appearing,  toxic-appearing or diaphoretic.   HENT:      Head: Normocephalic and atraumatic.      Right Ear: External ear normal.      Left Ear: External ear normal.      Nose: Nose normal. No congestion or rhinorrhea.      Mouth/Throat:      Mouth: Mucous membranes are moist.      Pharynx: Oropharynx is clear. No oropharyngeal exudate or posterior oropharyngeal erythema.   Eyes:      General: No scleral icterus.     Extraocular Movements: Extraocular movements intact.      Conjunctiva/sclera: Conjunctivae normal.      Pupils: Pupils are equal, round, and reactive to light.   Neck:      Vascular: No carotid bruit.   Cardiovascular:      Rate and Rhythm: Normal rate and regular rhythm.      Pulses: Normal pulses.      Heart sounds: Normal heart sounds. No murmur heard.     No friction rub. No gallop.   Pulmonary:      Effort: Pulmonary effort is normal. No respiratory distress.      Breath sounds: Normal breath sounds. No stridor. No wheezing, rhonchi or rales.   Chest:      Chest wall: No tenderness.   Abdominal:      General: Abdomen is flat. Bowel sounds are normal. There is no distension.      Palpations: Abdomen is soft.      Tenderness: There is abdominal tenderness. There is no right CVA tenderness, left CVA tenderness, guarding or rebound.      Hernia: No hernia is present.      Comments: Mild TTP throughout without evidence of guarding or rebound tenderness noted   Musculoskeletal:         General: No swelling, tenderness, deformity or signs of injury. Normal range of motion.      Cervical back: Normal range of motion and neck supple. No rigidity or tenderness.      Right lower leg: No edema.      Left lower leg: No edema.   Lymphadenopathy:      Cervical: No cervical adenopathy.   Skin:     General: Skin is warm and dry.      Capillary Refill: Capillary refill takes less than 2 seconds.      Coloration: Skin is not jaundiced or pale.      Findings: No bruising, erythema, lesion or rash.   Neurological:      General: No  focal deficit present.      Mental Status: She is alert and oriented to person, place, and time. Mental status is at baseline.      Cranial Nerves: No cranial nerve deficit.      Sensory: No sensory deficit.      Motor: No weakness.      Coordination: Coordination normal.   Psychiatric:         Mood and Affect: Mood normal.         Behavior: Behavior normal.         Thought Content: Thought content normal.         Judgment: Judgment normal.          Significant Labs: All pertinent labs within the past 24 hours have been reviewed.  CBC:   Recent Labs   Lab 01/24/24 0428 01/25/24 0519   WBC 19.00* 18.87*   HGB 8.8* 8.7*   HCT 27.9* 28.3*    466*     CMP:   Recent Labs   Lab 01/24/24 0428 01/25/24 0519    141   K 3.7 3.4*   * 112*   CO2 24 25   GLU 95 123*   BUN 35* 31*   CREATININE 1.7* 1.6*   CALCIUM 8.0* 7.8*   PROT 5.7* 5.8*   ALBUMIN 1.8* 1.8*   BILITOT 0.4 0.3   ALKPHOS 401* 414*   AST 45* 48*   ALT 13 15   ANIONGAP 6* 4*       Significant Imaging:     Imaging Results              X-Ray Lumbar Spine Ap And Lateral (Final result)  Result time 01/23/24 02:05:07      Final result by Lisbeth Soria MD (01/23/24 02:05:07)                   Impression:      No acute fracture.      Electronically signed by: Lisbeth Soria  Date:    01/23/2024  Time:    02:05               Narrative:    EXAMINATION:  XR LUMBAR SPINE AP AND LATERAL    CLINICAL HISTORY:  lumbar back pain;    TECHNIQUE:  AP, lateral and spot images were performed of the lumbar spine.    COMPARISON:  None    FINDINGS:  No acute fracture.  Minimal anterolisthesis at L4-5.  Moderate to advanced diffuse multilevel discogenic disease.  Advanced lower lumbar facet arthrosis.                                        CT Chest Without Contrast (Final result)  Result time 01/22/24 20:31:26      Final result by Ayush Moss MD (01/22/24 20:31:26)                   Impression:      Innumerable pulmonary nodules primarily  concerning for metastatic disease.  Evaluation of number and size is limited by motion.    Small bilateral pleural effusions.    Trace pericardial effusion.    Complete findings as above.    This report was flagged in Epic as abnormal.    All CT scans at this facility are performed  using dose modulation techniques as appropriate to performed exam including the following:  automated exposure control; adjustment of mA and/or kV according to the patients size (this includes techniques or standardized protocols for targeted exams where dose is matched to indication/reason for exam: i.e. extremities or head);  iterative reconstruction technique.      Electronically signed by: Ayush Moss  Date:    01/22/2024  Time:    20:31               Narrative:    EXAMINATION:  CT CHEST WITHOUT CONTRAST    CLINICAL HISTORY:  Lymphadenopathy, chest or axilla;    TECHNIQUE:  Low dose axial images, sagittal and coronal reformations were obtained from the thoracic inlet to the lung bases. Contrast was not administered.    COMPARISON:  None    FINDINGS:  Base of Neck: No significant abnormality.    Thoracic soft tissues: Normal.    Aorta: Left-sided aortic arch.  No aneurysm and no significant atherosclerosis    Heart: Trace pericardial effusion.  Normal size.    Pulmonary vasculature: Pulmonary arteries distribute normally.  There are four pulmonary veins.    Rylee/Mediastinum: No pathologic mauri enlargement.    Airways: Patent.    Lungs/Pleura: Small bilateral pleural effusions.  Innumerable small nodular opacities throughout the lungs.  Motion severely degrades fine parenchymal evaluation and accurate determination of number and size of nodules.  These would be presumed neoplastic and metastatic until proven otherwise.    Esophagus: Normal.    Bones: No acute fracture. No suspicious lytic or sclerotic lesions.  Osseous degenerative changes.                                        CT Abdomen Pelvis  Without Contrast (Final result)   Result time 01/22/24 18:34:22      Final result by Ayush Moss MD (01/22/24 18:34:22)                   Impression:      Innumerable hepatic lesions consistent with metastatic disease.  Additional retroperitoneal adenopathy.    At least small bilateral pleural effusions. Increased interstitial attenuation. Innumerable nodular pulmonary opacities in the lung bases favored related to neoplasm.    Small volume abdominopelvic ascites.    Complete findings as above.    This report was flagged in Epic as abnormal.    All CT scans at this facility are performed  using dose modulation techniques as appropriate to performed exam including the following:  automated exposure control; adjustment of mA and/or kV according to the patients size (this includes techniques or standardized protocols for targeted exams where dose is matched to indication/reason for exam: i.e. extremities or head);  iterative reconstruction technique.      Electronically signed by: Ayush Moss  Date:    01/22/2024  Time:    18:34               Narrative:    EXAMINATION:  CT ABDOMEN PELVIS WITHOUT CONTRAST    CLINICAL HISTORY:  Abdominal pain, acute, nonlocalized;    TECHNIQUE:  Low dose axial images, sagittal and coronal reformations were obtained from the lung bases to the pubic symphysis.  Contrast was not administered.    COMPARISON:  None    FINDINGS:  Heart: Normal in size. No pericardial effusion.    Lung Bases: At least small bilateral pleural effusions.  Increased interstitial attenuation.  Innumerable nodular pulmonary opacities in the lung bases favored related to neoplasm    Liver: Multiple hepatic hypodensities most concerning for neoplasm.    Gallbladder: Irregularity along the gallbladder lumen possibly stones.    Bile Ducts: No evidence of dilated ducts.    Pancreas: No mass or peripancreatic fat stranding.    Spleen: Unremarkable.    Adrenals: Unremarkable.    Kidneys/ Ureters: Multiple renal hypodensities not all fully  characterized but favoring simple cysts.  No hydronephrosis.  Nonobstructive left nephrolithiasis.    Bladder: No evidence of wall thickening.    Reproductive organs: Uterus is surgically absent.    GI Tract/Mesentery: Diverticulosis without diverticulitis.  No evidence of appendicitis.    Peritoneal Space: Small volume abdominopelvic ascites.  No free air.    Retroperitoneum: Retroperitoneal adenopathy.    Abdominal wall: Unremarkable.    Vasculature: No significant atherosclerosis or aneurysm.    Bones: No acute fracture.  Osseous degenerative changes.                                       Assessment/Plan:      * Abdominal pain        CKD (chronic kidney disease) stage 3, GFR 30-59 ml/min  Creatine stable for now. BMP reviewed- noted Estimated Creatinine Clearance: 30.7 mL/min (based on SCr of 1.4 mg/dL). according to latest data. Based on current GFR, CKD stage is stage 3 - GFR 30-59.  Monitor UOP and serial BMP and adjust therapy as needed. Renally dose meds. Avoid nephrotoxic medications and procedures.      Metastatic disease  Patient has evidence of metastatic cancer of unknown primary. The cancer has metastasized to liver and lungs.CT chest/abdomen/pelvis revealed evidence of numerous pulmonary nodules as well as numerous hepatic lesions.  Patient reports being up-to-date on cancer screenings. The patient is not under the care of an outpatient oncologist. The patient is not undergoing active chemotherapy. Their staging information is listed below.   Cancer Staging   No matching staging information was found for the patient.  Plan:  -CEA, AFP,   -f/u hematology/oncology       Anemia in chronic kidney disease  Patient's anemia is currently  lower than last reported baseline of 11.4/35.4 . Has not received any PRBCs to date. Etiology likely d/t Iron deficiency and chronic disease due to Chronic Kidney Disease/ESRD in addition to underlying malignancy.   Current CBC reviewed-   Lab Results   Component Value  Date    HGB 9.7 (L) 01/22/2024    HCT 30.6 (L) 01/22/2024     Monitor serial CBC and transfuse if patient becomes hemodynamically unstable, symptomatic or H/H drops below 7/21.      Gout  Not in acute flare.   Plan:  -continue to monitor       Hypothyroidism  Patient has chronic hypothyroidism. TFTs reviewed-   Lab Results   Component Value Date    TSH 0.688 10/03/2023   Plan:  -Will continue chronic levothyroxine and adjust for and clinical changes      Essential hypertension  Chronic, controlled. Latest blood pressure and vitals reviewed-     Temp:  [98.8 °F (37.1 °C)-98.9 °F (37.2 °C)]   Pulse:  [88-92]   Resp:  [18-20]   BP: (124-133)/(60-76)   SpO2:  [96 %-97 %] .   Home meds for hypertension were reviewed and noted below.   Hypertension Medications               amLODIPine (NORVASC) 5 MG tablet TAKE 1 TABLET ONE TIME DAILY    carvediloL (COREG) 25 MG tablet TAKE 1 TABLET TWICE DAILY WITH MEALS    hydrALAZINE (APRESOLINE) 10 MG tablet TAKE 1 TABLET THREE TIMES DAILY    losartan (COZAAR) 100 MG tablet TAKE 1 TABLET ONE TIME DAILY    nitroGLYCERIN (NITROSTAT) 0.3 MG SL tablet           While in the hospital, will manage blood pressure as follows; Continue home antihypertensive regimen    Will utilize p.r.n. blood pressure medication only if patient's blood pressure greater than 160/100 and she develops symptoms such as worsening chest pain or shortness of breath.      Coronary artery disease involving native coronary artery of native heart without angina pectoris  Patient with known CAD which is controlled Will continue ASA, Plavix, and Statin and monitor for S/Sx of angina/ACS. Continue to monitor on telemetry.       Hyperlipidemia  Patient is chronically on statin.will continue for now. Last Lipid Panel:   Lab Results   Component Value Date    CHOL 111 10/03/2023    HDL 29 (L) 10/03/2023    LDLCALC 54 10/03/2023    TRIG 138 10/03/2023   Plan:  -Continue home medication  -low fat/low calorie diet      Diabetes  "mellitus  Patient's FSGs are controlled on current medication regimen.  Last A1c reviewed-   Lab Results   Component Value Date    LABA1C 5.9 07/02/2020    HGBA1C 6.3 (H) 01/05/2024     Most recent fingerstick glucose reviewed- No results for input(s): "POCTGLUCOSE" in the last 24 hours.  Current correctional scale  Low  titrate as needed  anti-hyperglycemic dose as follows-   Antihyperglycemics (From admission, onward)      Start     Stop Route Frequency Ordered    01/22/24 2126  insulin aspart U-100 pen 0-5 Units         -- SubQ Before meals & nightly PRN 01/22/24 2027        Plan:  -SSI  -Accu-checks  -Hold oral hypoglycemics while patient is in the hospital  -Hypoglycemic protocol         VTE Risk Mitigation (From admission, onward)           Ordered     Place sequential compression device  Until discontinued         01/23/24 0830     IP VTE HIGH RISK PATIENT  Once         01/22/24 2027     Place sequential compression device  Until discontinued         01/22/24 2027                    Discharge Planning   NARDA:      Code Status: Full Code   Is the patient medically ready for discharge?:     Reason for patient still in hospital (select all that apply): Patient trending condition, Consult recommendations, and Pending disposition  Discharge Plan A: Home, Home with family                  Henrydevin Rosetta Moore MD  Department of Hospital Medicine   [facility] - Telemetry ([facility])    "

## 2024-01-26 PROBLEM — R65.10 SIRS (SYSTEMIC INFLAMMATORY RESPONSE SYNDROME): Status: ACTIVE | Noted: 2024-01-01

## 2024-01-26 NOTE — PROGRESS NOTES
Home Oxygen Evaluation    Date Performed: 2024    1) Patient's Home O2 Sat on room air, while at rest: 91%        If O2 sats on room air at rest are 88% or below, patient qualifies. No additional testing needed. Document N/A in steps 2 and 3. If 89% or above, complete steps 2.      2) Patient's O2 Sat on room air while exercisin%        If O2 sats on room air while exercising remain 89% or above patient does not qualify, no further testing needed Document N/A in step 3. If O2 sats on room air while exercising are 88% or below, continue to step 3.      3) Patient's O2 Sat while exercising on O2: 94% at 2 LPM         (Must show improvement from #2 for patients to qualify)    If O2 sats improve on oxygen, patient qualifies for portable oxygen. If not, the patient does not qualify.

## 2024-01-26 NOTE — SUBJECTIVE & OBJECTIVE
Interval History:     No acute events overnight   Complaining of abdominal discomfort, ultrasound abdomen showed- Multiple known liver masses suspicious for metastatic disease.  The gallbladder is abnormal in appearance with multiple gallstones as well as a 2.4 cm hypoechoic finding within it suspicious for mass.  Additionally there is some irregular gallbladder wall thickening.  Gallbladder is abnormal with gallstones as well as masslike findings which could represent a primary gallbladder malignancy.  Mild ascites.   Complaining of ongoing diarrhea, follow up on stool studies, ordered 1 time Imodium, continue cholestyramine   Afebrile, persistent leukocytosis likely reactive from underlying suspected malignancy.        Review of Systems  Objective:     Vital Signs (Most Recent):  Temp: 98.1 °F (36.7 °C) (01/26/24 1139)  Pulse: 82 (01/26/24 1139)  Resp: 18 (01/26/24 1139)  BP: (!) 121/58 (01/26/24 1139)  SpO2: (!) 93 % (01/26/24 1139) Vital Signs (24h Range):  Temp:  [96.5 °F (35.8 °C)-99 °F (37.2 °C)] 98.1 °F (36.7 °C)  Pulse:  [80-92] 82  Resp:  [14-21] 18  SpO2:  [87 %-97 %] 93 %  BP: (106-132)/(52-63) 121/58     Weight: 73.5 kg (162 lb 0.6 oz)  Body mass index is 30.62 kg/m².    Intake/Output Summary (Last 24 hours) at 1/26/2024 1309  Last data filed at 1/26/2024 0840  Gross per 24 hour   Intake --   Output 1 ml   Net -1 ml         Physical Exam        Constitutional:       General: She is not in acute distress.     Appearance: Normal appearance. She is normal weight. She is not ill-appearing, toxic-appearing or diaphoretic.   HENT:      Head: Normocephalic and atraumatic.      Right Ear: External ear normal.      Left Ear: External ear normal.      Nose: Nose normal. No congestion or rhinorrhea.      Mouth/Throat:      Mouth: Mucous membranes are moist.      Pharynx: Oropharynx is clear. No oropharyngeal exudate or posterior oropharyngeal erythema.   Eyes:      General: No scleral icterus.     Extraocular  Movements: Extraocular movements intact.      Conjunctiva/sclera: Conjunctivae normal.      Pupils: Pupils are equal, round, and reactive to light.   Neck:      Vascular: No carotid bruit.   Cardiovascular:      Rate and Rhythm: Normal rate and regular rhythm.      Pulses: Normal pulses.      Heart sounds: Normal heart sounds. No murmur heard.     No friction rub. No gallop.   Pulmonary:      Effort: Pulmonary effort is normal. No respiratory distress.      Breath sounds: Normal breath sounds. No stridor. No wheezing, rhonchi or rales.   Chest:      Chest wall: No tenderness.   Abdominal:      General: Abdomen is flat. Bowel sounds are normal. There is no distension.      Palpations: Abdomen is soft.      Tenderness: There is abdominal tenderness. There is no right CVA tenderness, left CVA tenderness, guarding or rebound.      Hernia: No hernia is present.      Comments: Mild TTP throughout without evidence of guarding or rebound tenderness noted   Musculoskeletal:         General: No swelling, tenderness, deformity or signs of injury. Normal range of motion.      Cervical back: Normal range of motion and neck supple. No rigidity or tenderness.      Right lower leg: No edema.      Left lower leg: No edema.   Lymphadenopathy:      Cervical: No cervical adenopathy.   Skin:     General: Skin is warm and dry.      Capillary Refill: Capillary refill takes less than 2 seconds.      Coloration: Skin is not jaundiced or pale.      Findings: No bruising, erythema, lesion or rash.   Neurological:      General: No focal deficit present.      Mental Status: She is alert and oriented to person, place, and time. Mental status is at baseline.      Cranial Nerves: No cranial nerve deficit.      Sensory: No sensory deficit.      Motor: No weakness.      Coordination: Coordination normal.   Psychiatric:         Mood and Affect: Mood normal.         Behavior: Behavior normal.         Thought Content: Thought content normal.          Judgment: Judgment normal.           Significant Labs: All pertinent labs within the past 24 hours have been reviewed.  CBC:   Recent Labs   Lab 01/25/24  0519 01/26/24  0758   WBC 18.87* 18.29*   HGB 8.7* 8.8*   HCT 28.3* 29.1*   * 472*     CMP:   Recent Labs   Lab 01/25/24  0519      K 3.4*   *   CO2 25   *   BUN 31*   CREATININE 1.6*   CALCIUM 7.8*   PROT 5.8*   ALBUMIN 1.8*   BILITOT 0.3   ALKPHOS 414*   AST 48*   ALT 15   ANIONGAP 4*       Significant Imaging:     Imaging Results              X-Ray Lumbar Spine Ap And Lateral (Final result)  Result time 01/23/24 02:05:07      Final result by Lisbeth Soria MD (01/23/24 02:05:07)                   Impression:      No acute fracture.      Electronically signed by: Lisbeth Soria  Date:    01/23/2024  Time:    02:05               Narrative:    EXAMINATION:  XR LUMBAR SPINE AP AND LATERAL    CLINICAL HISTORY:  lumbar back pain;    TECHNIQUE:  AP, lateral and spot images were performed of the lumbar spine.    COMPARISON:  None    FINDINGS:  No acute fracture.  Minimal anterolisthesis at L4-5.  Moderate to advanced diffuse multilevel discogenic disease.  Advanced lower lumbar facet arthrosis.                                        CT Chest Without Contrast (Final result)  Result time 01/22/24 20:31:26      Final result by Ayush Moss MD (01/22/24 20:31:26)                   Impression:      Innumerable pulmonary nodules primarily concerning for metastatic disease.  Evaluation of number and size is limited by motion.    Small bilateral pleural effusions.    Trace pericardial effusion.    Complete findings as above.    This report was flagged in Epic as abnormal.    All CT scans at this facility are performed  using dose modulation techniques as appropriate to performed exam including the following:  automated exposure control; adjustment of mA and/or kV according to the patients size (this includes techniques or  standardized protocols for targeted exams where dose is matched to indication/reason for exam: i.e. extremities or head);  iterative reconstruction technique.      Electronically signed by: Ayush Moss  Date:    01/22/2024  Time:    20:31               Narrative:    EXAMINATION:  CT CHEST WITHOUT CONTRAST    CLINICAL HISTORY:  Lymphadenopathy, chest or axilla;    TECHNIQUE:  Low dose axial images, sagittal and coronal reformations were obtained from the thoracic inlet to the lung bases. Contrast was not administered.    COMPARISON:  None    FINDINGS:  Base of Neck: No significant abnormality.    Thoracic soft tissues: Normal.    Aorta: Left-sided aortic arch.  No aneurysm and no significant atherosclerosis    Heart: Trace pericardial effusion.  Normal size.    Pulmonary vasculature: Pulmonary arteries distribute normally.  There are four pulmonary veins.    Rylee/Mediastinum: No pathologic mauri enlargement.    Airways: Patent.    Lungs/Pleura: Small bilateral pleural effusions.  Innumerable small nodular opacities throughout the lungs.  Motion severely degrades fine parenchymal evaluation and accurate determination of number and size of nodules.  These would be presumed neoplastic and metastatic until proven otherwise.    Esophagus: Normal.    Bones: No acute fracture. No suspicious lytic or sclerotic lesions.  Osseous degenerative changes.                                        CT Abdomen Pelvis  Without Contrast (Final result)  Result time 01/22/24 18:34:22      Final result by Ayush Moss MD (01/22/24 18:34:22)                   Impression:      Innumerable hepatic lesions consistent with metastatic disease.  Additional retroperitoneal adenopathy.    At least small bilateral pleural effusions. Increased interstitial attenuation. Innumerable nodular pulmonary opacities in the lung bases favored related to neoplasm.    Small volume abdominopelvic ascites.    Complete findings as above.    This report was  flagged in Epic as abnormal.    All CT scans at this facility are performed  using dose modulation techniques as appropriate to performed exam including the following:  automated exposure control; adjustment of mA and/or kV according to the patients size (this includes techniques or standardized protocols for targeted exams where dose is matched to indication/reason for exam: i.e. extremities or head);  iterative reconstruction technique.      Electronically signed by: Ayush Moss  Date:    01/22/2024  Time:    18:34               Narrative:    EXAMINATION:  CT ABDOMEN PELVIS WITHOUT CONTRAST    CLINICAL HISTORY:  Abdominal pain, acute, nonlocalized;    TECHNIQUE:  Low dose axial images, sagittal and coronal reformations were obtained from the lung bases to the pubic symphysis.  Contrast was not administered.    COMPARISON:  None    FINDINGS:  Heart: Normal in size. No pericardial effusion.    Lung Bases: At least small bilateral pleural effusions.  Increased interstitial attenuation.  Innumerable nodular pulmonary opacities in the lung bases favored related to neoplasm    Liver: Multiple hepatic hypodensities most concerning for neoplasm.    Gallbladder: Irregularity along the gallbladder lumen possibly stones.    Bile Ducts: No evidence of dilated ducts.    Pancreas: No mass or peripancreatic fat stranding.    Spleen: Unremarkable.    Adrenals: Unremarkable.    Kidneys/ Ureters: Multiple renal hypodensities not all fully characterized but favoring simple cysts.  No hydronephrosis.  Nonobstructive left nephrolithiasis.    Bladder: No evidence of wall thickening.    Reproductive organs: Uterus is surgically absent.    GI Tract/Mesentery: Diverticulosis without diverticulitis.  No evidence of appendicitis.    Peritoneal Space: Small volume abdominopelvic ascites.  No free air.    Retroperitoneum: Retroperitoneal adenopathy.    Abdominal wall: Unremarkable.    Vasculature: No significant atherosclerosis or  aneurysm.    Bones: No acute fracture.  Osseous degenerative changes.

## 2024-01-26 NOTE — PLAN OF CARE
[facility] - Telemetry (Hospital)  Initial Discharge Assessment       Primary Care Provider: Irma Ro MD    Admission Diagnosis: Metastatic disease [C79.9]  Chest pain [R07.9]    Admission Date: 1/22/2024  Expected Discharge Date:     Transition of Care Barriers: None    Payor: HU[employer] MANAGED MEDICARE / Plan: HU[employer] MEDICARE HMO / Product Type: Capitation /     Extended Emergency Contact Information  Primary Emergency Contact: Sourav Allan  Address: [address] [place] [address] [address] [place] [address] [facility]  Home Phone: [phone]  Mobile Phone: [phone]  Relation: Spouse    Discharge Plan A: Home, Home with family         [facility] - [place], [place] - [address] [facility]  [address] [facility] [address]  Phone: [phone] Fax: [phone]    [facility] ([facility]) - [place], [place] - [address] [facility]  [address] [id number]  Phone: [phone] Fax: [phone]    [id number]  [place] - [address] [facility]  [address]  [place] [address]  Phone: [phone] Fax: [phone]      Initial Assessment (most recent)       Adult Discharge Assessment - 01/24/24 1202          Discharge Assessment    Assessment Type Discharge Planning Assessment     Confirmed/corrected address, phone number and insurance Yes     Confirmed Demographics Correct on Facesheet     Source of Information patient     Communicated NARDA with patient/caregiver Date not available/Unable to determine     Reason For Admission Abdominal pain     People in Home spouse     Facility Arrived From: Home     Do you expect to return to your current living situation? Yes     Do you have help at home or someone to help you manage your care at home? Yes     Who are your caregiver(s) and their phone number(s)? Spouse Sourav     Prior to hospitilization cognitive  status: Alert/Oriented     Current cognitive status: Alert/Oriented     Walking or Climbing Stairs Difficulty no     Dressing/Bathing Difficulty no     Equipment Currently Used at Home none     Readmission within 30 days? No     Patient currently being followed by outpatient case management? No     Do you currently have service(s) that help you manage your care at home? No     Do you take prescription medications? Yes     Do you have prescription coverage? Yes     Do you have any problems affording any of your prescribed medications? No     Is the patient taking medications as prescribed? yes     Who is going to help you get home at discharge? Pts spouse     How do you get to doctors appointments? car, drives self;family or friend will provide     Are you on dialysis? No     Do you take coumadin? No     Discharge Plan A Home;Home with family     DME Needed Upon Discharge  none     Discharge Plan discussed with: Patient     Transition of Care Barriers None                   SW met with patient at bedside to complete discharge assessment. Pt reports living at home with spouse. Pt has a walker and cane at home for use, if needed. Pt reports not being current with home health but has used in the past. Pt's spouse will assist with discharge transportation home.   Pt's whiteboard updated with CM contact and anticipated discharge disposition. SW to remain available as needed.

## 2024-01-26 NOTE — ASSESSMENT & PLAN NOTE
Was started on empiric Zosyn /Zyvox- UA is normal  Chest CT scan -Lungs/Pleura: Small bilateral pleural effusions.  Innumerable small nodular opacities throughout the lungs.  Motion severely degrades fine parenchymal evaluation and accurate determination of number and size of nodules.  These would be presumed neoplastic and metastatic until proven otherwise.    Will plan to deescalate regime -stop Zyvox- no MRSA noted  Leucocytosis could be stress response -follow biopsy results

## 2024-01-26 NOTE — CONSULTS
[facility])  Infectious Disease  Consult Note    Patient Name: Tamy Allan  MRN: [id number]  Admission Date: 1/22/2024  Hospital Length of Stay: 4 days  Attending Physician: Kyleigh Moore,*  Primary Care Provider: Irma Ro MD     Isolation Status: No active isolations    Patient information was obtained from patient, past medical records, and ER records.      Consults  Assessment/Plan:     Cardiac/Vascular  Essential hypertension  BP control as per primary team    Renal/  CKD (chronic kidney disease) stage 3, GFR 30-59 ml/min  Will avoid nephrotoxic meds     ID  SIRS (systemic inflammatory response syndrome)  Was started on empiric Zosyn /Zyvox- UA is normal  Chest CT scan -Lungs/Pleura: Small bilateral pleural effusions.  Innumerable small nodular opacities throughout the lungs.  Motion severely degrades fine parenchymal evaluation and accurate determination of number and size of nodules.  These would be presumed neoplastic and metastatic until proven otherwise.    Will plan to deescalate regime -stop Zyvox- no MRSA noted  Leucocytosis could be stress response -follow biopsy results     Oncology  Metastatic disease  Follow IR for biopsy,oncology follow up    Endocrine  Diabetes mellitus  Insulin regime as per primary team        Thank you for your consult. I will follow-up with patient. Please contact us if you have any additional questions.    Pilo Xie MD, [facility]  Infectious Disease  [facility])    Subjective:     Principal Problem: Abdominal pain    HPI: 78 year old woman with  past medical history of Acquired hypothyroidism, Amblyopia, Arthritis, Benign hypertension, Cardiomegaly, Cataract, Diabetes mellitus, who was admitted with abdominal pain.  She appears weak  Labs and imaging test -    Component      Latest Ref Rng 1/22/2024 1/23/2024 1/24/2024 1/25/2024   WBC      3.90 - 12.70 K/uL 21.73 (H)  19.46 (H)  19.00 (H)  18.87 (H)    -serum procal  -0.52  CT chest/abdomen/pelvis revealing multiple pulmonary nodules as well as multiple hepatic lesions concerning for underlying malignancy/neoplasm.       Past Medical History:   Diagnosis Date    Acquired hypothyroidism     Amblyopia     OD    Arthritis     Benign hypertension     Cardiomegaly     Cataract     Diabetes mellitus, type 2     Encounter for blood transfusion     Encounter for general adult medical examination without abnormal findings 08/31/2016    Hyperlipidemia     Hyperlipidemia     Hypertension     Refractive error     Type 2 diabetes mellitus     Vitamin D deficiency        Past Surgical History:   Procedure Laterality Date    CARDIAC CATHETERIZATION      with stents    HYSTERECTOMY      TOTAL KNEE ARTHROPLASTY Left        Review of patient's allergies indicates:   Allergen Reactions    Aspirin     Ditropan [oxybutynin chloride]      itching    Iodine and iodide containing products     Iodinated contrast media Other (See Comments)     Unknown reaction per patient       Medications:  Medications Prior to Admission   Medication Sig    amLODIPine (NORVASC) 5 MG tablet TAKE 1 TABLET ONE TIME DAILY    aspirin 81 MG Chew Take 81 mg by mouth once daily.    atorvastatin (LIPITOR) 40 MG tablet TAKE 1 TABLET EVERY DAY    CALCIUM CARB/VIT D3/MINERALS (CALTRATE PLUS ORAL) Take by mouth.    calcium carbonate (OS-LYNNETTE) 600 mg calcium (1,500 mg) Tab Take 1 tablet by mouth every morning.    carvediloL (COREG) 25 MG tablet TAKE 1 TABLET TWICE DAILY WITH MEALS    CETIRIZINE HCL (ZYRTEC ORAL) Take by mouth.    CYANOCOBALAMIN, VITAMIN B-12, (VITAMIN B-12 ORAL) Take by mouth once daily.     cyclobenzaprine (FLEXERIL) 10 MG tablet Take 1 tablet by mouth nightly as needed.    diphenhydrAMINE (BENADRYL) 50 MG capsule Take one tablet prior to scan    fluconazole (DIFLUCAN) 150 MG Tab One tab now and one tab in one week    fluticasone propionate (FLONASE) 50 mcg/actuation nasal spray USE 2 SPRAYS IN EACH NOSTRIL AT  BEDTIME (SUBSTITUTED FOR  FLONASE)    hydrALAZINE (APRESOLINE) 10 MG tablet TAKE 1 TABLET THREE TIMES DAILY    levothyroxine (SYNTHROID) 88 MCG tablet Take 1 tablet (88 mcg total) by mouth once daily.    linaGLIPtin (TRADJENTA) 5 mg Tab tablet TAKE 1 TABLET(5 MG) BY MOUTH EVERY DAY    losartan (COZAAR) 100 MG tablet TAKE 1 TABLET ONE TIME DAILY    meclizine (ANTIVERT) 25 mg tablet TAKE 1 TABLET THREE TIMES DAILY AS NEEDED    montelukast (SINGULAIR) 10 mg tablet Take 1 tablet (10 mg total) by mouth every evening.    MULTIVITAMIN W-MINERALS/LUTEIN (CENTRUM SILVER ORAL) Take by mouth.    mupirocin (BACTROBAN) 2 % ointment Apply topically 3 (three) times daily.    nitroGLYCERIN (NITROSTAT) 0.3 MG SL tablet     predniSONE (DELTASONE) 50 MG Tab     senna-docusate 8.6-50 mg (PERICOLACE) 8.6-50 mg per tablet Take 2 tablets by mouth daily as needed.    vitamin D (VITAMIN D3) 1000 units Tab Take 2,000 Units by mouth.     Antibiotics (From admission, onward)      Start     Stop Route Frequency Ordered    01/25/24 0900  linezolid tablet 600 mg         -- Oral Every 12 hours 01/25/24 0747    01/23/24 0300  piperacillin-tazobactam (ZOSYN) 4.5 g in dextrose 5 % in water (D5W) 100 mL IVPB (MB+)         -- IV Every 8 hours (non-standard times) 01/22/24 2027          Antifungals (From admission, onward)      None          Antivirals (From admission, onward)      None             Immunization History   Administered Date(s) Administered    COVID-19, MRNA, LN-S, PF (Pfizer) (Gray Cap) 07/25/2022    COVID-19, MRNA, LN-S, PF (Pfizer) (Purple Cap) 01/15/2021, 02/05/2021, 10/02/2021    COVID-19, mRNA, LNP-S, bivalent booster, PF (PFIZER OMICRON) 01/19/2023    Influenza (FLUAD) - Trivalent - Adjuvanted - PF (65+) 10/22/2018    Influenza - High Dose - PF (65 years and older) 10/08/2014, 10/29/2015, 08/31/2016, 09/01/2021    Influenza - Quadrivalent - High Dose - PF (65 years and older) 10/19/2020, 11/11/2022    Influenza - Quadrivalent - PF  *Preferred* (6 months and older) 10/18/2017, 10/09/2019    Pneumococcal Conjugate - 13 Valent 08/26/2015    Pneumococcal Polysaccharide - 23 Valent 08/25/2014, 10/23/2019       Family History       Problem Relation (Age of Onset)    Heart disease Mother, Father, Sister, Brother    Hypertension Mother, Father, Sister, Brother          Social History     Socioeconomic History    Marital status:    Tobacco Use    Smoking status: Never    Smokeless tobacco: Never   Substance and Sexual Activity    Alcohol use: No    Drug use: No    Sexual activity: Not Currently     Review of Systems   Constitutional:  Positive for activity change, appetite change and fatigue. Negative for chills, diaphoresis, fever and unexpected weight change.   HENT:  Negative for congestion.    Cardiovascular:  Negative for chest pain and leg swelling.   Neurological:  Negative for dizziness, facial asymmetry and headaches.     Objective:     Vital Signs (Most Recent):  Temp: 99 °F (37.2 °C) (01/26/24 0426)  Pulse: 89 (01/26/24 0426)  Resp: 18 (01/26/24 0426)  BP: 132/63 (01/26/24 0426)  SpO2: (!) 87 % (01/26/24 0426) Vital Signs (24h Range):  Temp:  [96.5 °F (35.8 °C)-99.4 °F (37.4 °C)] 99 °F (37.2 °C)  Pulse:  [80-89] 89  Resp:  [16-21] 18  SpO2:  [87 %-98 %] 87 %  BP: (111-132)/(52-63) 132/63     Weight: 73.5 kg (162 lb 0.6 oz)  Body mass index is 30.62 kg/m².    Estimated Creatinine Clearance: 27 mL/min (A) (based on SCr of 1.6 mg/dL (H)).     Physical Exam  Vitals and nursing note reviewed.   Pulmonary:      Effort: Pulmonary effort is normal.   Abdominal:      General: There is no distension.      Palpations: There is no mass.   Musculoskeletal:         General: No swelling.   Skin:     General: Skin is warm.   Neurological:      General: No focal deficit present.      Mental Status: She is alert. Mental status is at baseline.          Significant Labs: Blood Culture:   Recent Labs   Lab 01/22/24  1651 01/22/24  1652   LABBLOO No Growth  to date  No Growth to date  No Growth to date No Growth to date  No Growth to date  No Growth to date     BMP:   Recent Labs   Lab 01/25/24 0519   *      K 3.4*   *   CO2 25   BUN 31*   CREATININE 1.6*   CALCIUM 7.8*     CBC:   Recent Labs   Lab 01/25/24 0519   WBC 18.87*   HGB 8.7*   HCT 28.3*   *     CMP:   Recent Labs   Lab 01/25/24 0519      K 3.4*   *   CO2 25   *   BUN 31*   CREATININE 1.6*   CALCIUM 7.8*   PROT 5.8*   ALBUMIN 1.8*   BILITOT 0.3   ALKPHOS 414*   AST 48*   ALT 15   ANIONGAP 4*     Urine Culture:   Recent Labs   Lab 12/08/23  1220   LABURIN Final report     All pertinent labs within the past 24 hours have been reviewed.    Significant Imaging: I have reviewed all pertinent imaging results/findings within the past 24 hours.

## 2024-01-26 NOTE — HPI
78 year old woman with  past medical history of Acquired hypothyroidism, Amblyopia, Arthritis, Benign hypertension, Cardiomegaly, Cataract, Diabetes mellitus, who was admitted with abdominal pain.  She appears weak  Labs and imaging test -    Component      Latest Ref Rng 1/22/2024 1/23/2024 1/24/2024 1/25/2024   WBC      3.90 - 12.70 K/uL 21.73 (H)  19.46 (H)  19.00 (H)  18.87 (H)    -serum procal -0.52  CT chest/abdomen/pelvis revealing multiple pulmonary nodules as well as multiple hepatic lesions concerning for underlying malignancy/neoplasm.

## 2024-01-26 NOTE — SUBJECTIVE & OBJECTIVE
Past Medical History:   Diagnosis Date    Acquired hypothyroidism     Amblyopia     OD    Arthritis     Benign hypertension     Cardiomegaly     Cataract     Diabetes mellitus, type 2     Encounter for blood transfusion     Encounter for general adult medical examination without abnormal findings 08/31/2016    Hyperlipidemia     Hyperlipidemia     Hypertension     Refractive error     Type 2 diabetes mellitus     Vitamin D deficiency        Past Surgical History:   Procedure Laterality Date    CARDIAC CATHETERIZATION      with stents    HYSTERECTOMY      TOTAL KNEE ARTHROPLASTY Left        Review of patient's allergies indicates:   Allergen Reactions    Aspirin     Ditropan [oxybutynin chloride]      itching    Iodine and iodide containing products     Iodinated contrast media Other (See Comments)     Unknown reaction per patient       Medications:  Medications Prior to Admission   Medication Sig    amLODIPine (NORVASC) 5 MG tablet TAKE 1 TABLET ONE TIME DAILY    aspirin 81 MG Chew Take 81 mg by mouth once daily.    atorvastatin (LIPITOR) 40 MG tablet TAKE 1 TABLET EVERY DAY    CALCIUM CARB/VIT D3/MINERALS (CALTRATE PLUS ORAL) Take by mouth.    calcium carbonate (OS-LYNNETTE) 600 mg calcium (1,500 mg) Tab Take 1 tablet by mouth every morning.    carvediloL (COREG) 25 MG tablet TAKE 1 TABLET TWICE DAILY WITH MEALS    CETIRIZINE HCL (ZYRTEC ORAL) Take by mouth.    CYANOCOBALAMIN, VITAMIN B-12, (VITAMIN B-12 ORAL) Take by mouth once daily.     cyclobenzaprine (FLEXERIL) 10 MG tablet Take 1 tablet by mouth nightly as needed.    diphenhydrAMINE (BENADRYL) 50 MG capsule Take one tablet prior to scan    fluconazole (DIFLUCAN) 150 MG Tab One tab now and one tab in one week    fluticasone propionate (FLONASE) 50 mcg/actuation nasal spray USE 2 SPRAYS IN EACH NOSTRIL AT BEDTIME (SUBSTITUTED FOR  FLONASE)    hydrALAZINE (APRESOLINE) 10 MG tablet TAKE 1 TABLET THREE TIMES DAILY    levothyroxine (SYNTHROID) 88 MCG tablet Take 1  tablet (88 mcg total) by mouth once daily.    linaGLIPtin (TRADJENTA) 5 mg Tab tablet TAKE 1 TABLET(5 MG) BY MOUTH EVERY DAY    losartan (COZAAR) 100 MG tablet TAKE 1 TABLET ONE TIME DAILY    meclizine (ANTIVERT) 25 mg tablet TAKE 1 TABLET THREE TIMES DAILY AS NEEDED    montelukast (SINGULAIR) 10 mg tablet Take 1 tablet (10 mg total) by mouth every evening.    MULTIVITAMIN W-MINERALS/LUTEIN (CENTRUM SILVER ORAL) Take by mouth.    mupirocin (BACTROBAN) 2 % ointment Apply topically 3 (three) times daily.    nitroGLYCERIN (NITROSTAT) 0.3 MG SL tablet     predniSONE (DELTASONE) 50 MG Tab     senna-docusate 8.6-50 mg (PERICOLACE) 8.6-50 mg per tablet Take 2 tablets by mouth daily as needed.    vitamin D (VITAMIN D3) 1000 units Tab Take 2,000 Units by mouth.     Antibiotics (From admission, onward)      Start     Stop Route Frequency Ordered    01/25/24 0900  linezolid tablet 600 mg         -- Oral Every 12 hours 01/25/24 0747    01/23/24 0300  piperacillin-tazobactam (ZOSYN) 4.5 g in dextrose 5 % in water (D5W) 100 mL IVPB (MB+)         -- IV Every 8 hours (non-standard times) 01/22/24 2027          Antifungals (From admission, onward)      None          Antivirals (From admission, onward)      None             Immunization History   Administered Date(s) Administered    COVID-19, MRNA, LN-S, PF (Pfizer) (Gray Cap) 07/25/2022    COVID-19, MRNA, LN-S, PF (Pfizer) (Purple Cap) 01/15/2021, 02/05/2021, 10/02/2021    COVID-19, mRNA, LNP-S, bivalent booster, PF (PFIZER OMICRON) 01/19/2023    Influenza (FLUAD) - Trivalent - Adjuvanted - PF (65+) 10/22/2018    Influenza - High Dose - PF (65 years and older) 10/08/2014, 10/29/2015, 08/31/2016, 09/01/2021    Influenza - Quadrivalent - High Dose - PF (65 years and older) 10/19/2020, 11/11/2022    Influenza - Quadrivalent - PF *Preferred* (6 months and older) 10/18/2017, 10/09/2019    Pneumococcal Conjugate - 13 Valent 08/26/2015    Pneumococcal Polysaccharide - 23 Valent 08/25/2014,  10/23/2019       Family History       Problem Relation (Age of Onset)    Heart disease Mother, Father, Sister, Brother    Hypertension Mother, Father, Sister, Brother          Social History     Socioeconomic History    Marital status:    Tobacco Use    Smoking status: Never    Smokeless tobacco: Never   Substance and Sexual Activity    Alcohol use: No    Drug use: No    Sexual activity: Not Currently     Review of Systems   Constitutional:  Positive for activity change, appetite change and fatigue. Negative for chills, diaphoresis, fever and unexpected weight change.   HENT:  Negative for congestion.    Cardiovascular:  Negative for chest pain and leg swelling.   Neurological:  Negative for dizziness, facial asymmetry and headaches.     Objective:     Vital Signs (Most Recent):  Temp: 99 °F (37.2 °C) (01/26/24 0426)  Pulse: 89 (01/26/24 0426)  Resp: 18 (01/26/24 0426)  BP: 132/63 (01/26/24 0426)  SpO2: (!) 87 % (01/26/24 0426) Vital Signs (24h Range):  Temp:  [96.5 °F (35.8 °C)-99.4 °F (37.4 °C)] 99 °F (37.2 °C)  Pulse:  [80-89] 89  Resp:  [16-21] 18  SpO2:  [87 %-98 %] 87 %  BP: (111-132)/(52-63) 132/63     Weight: 73.5 kg (162 lb 0.6 oz)  Body mass index is 30.62 kg/m².    Estimated Creatinine Clearance: 27 mL/min (A) (based on SCr of 1.6 mg/dL (H)).     Physical Exam  Vitals and nursing note reviewed.   Pulmonary:      Effort: Pulmonary effort is normal.   Abdominal:      General: There is no distension.      Palpations: There is no mass.   Musculoskeletal:         General: No swelling.   Skin:     General: Skin is warm.   Neurological:      General: No focal deficit present.      Mental Status: She is alert. Mental status is at baseline.          Significant Labs: Blood Culture:   Recent Labs   Lab 01/22/24  1651 01/22/24  1652   LABBLOO No Growth to date  No Growth to date  No Growth to date No Growth to date  No Growth to date  No Growth to date     BMP:   Recent Labs   Lab 01/25/24  0519   GLU  123*      K 3.4*   *   CO2 25   BUN 31*   CREATININE 1.6*   CALCIUM 7.8*     CBC:   Recent Labs   Lab 01/25/24  0519   WBC 18.87*   HGB 8.7*   HCT 28.3*   *     CMP:   Recent Labs   Lab 01/25/24  0519      K 3.4*   *   CO2 25   *   BUN 31*   CREATININE 1.6*   CALCIUM 7.8*   PROT 5.8*   ALBUMIN 1.8*   BILITOT 0.3   ALKPHOS 414*   AST 48*   ALT 15   ANIONGAP 4*     Urine Culture:   Recent Labs   Lab 12/08/23  1220   LABURIN Final report     All pertinent labs within the past 24 hours have been reviewed.    Significant Imaging: I have reviewed all pertinent imaging results/findings within the past 24 hours.

## 2024-01-26 NOTE — PLAN OF CARE
[facility] - Telemetry (Hospital)  Discharge Reassessment    Primary Care Provider: Irma Ro MD    Expected Discharge Date:     Reassessment (most recent)       Discharge Reassessment - 01/26/24 1316          Discharge Reassessment    Assessment Type Discharge Planning Reassessment     Did the patient's condition or plan change since previous assessment? No     Discharge Plan discussed with: Patient     Communicated NRADA with patient/caregiver Date not available/Unable to determine     Discharge Plan A Home;Home with family     Discharge Plan B Home with family     DME Needed Upon Discharge  oxygen     Transition of Care Barriers None     Why the patient remains in the hospital Requires continued medical care        Post-Acute Status    Discharge Delays None known at this time                   Pt remains hospitalized d/t continued medical care. Pt anticipated to discharge home this weekend, pending medical clearance. MD ordered home oxygen evaluation; pt qualifies at 2L. Home oxygen order needed prior to hospital discharge.     CM to remain available for discharge planning needs.

## 2024-01-26 NOTE — PLAN OF CARE
Aox4. Able to verbalize needs & follow commands.    STATED SHE FEELS LIKE SHE IS DYING & THAT SHE WANTS TO GO HOME & DIE.   POC reviewed with pt. Interventions implemented as appropriate.    NAEON.   VS stable.   On 2L NC.    22g PIV to LFA patent. Dsg CDI. Integrity maintained.    D5W/NS infusing at 75 mL/hr.  Receiving IV abx/ No adverse reactions noted.  Cardiac diet.   Skin WDI. No new skin issues.   No c/o pain.   Continent of b/b. Up to toilet.   CBG AC/HS.  Refusing SCDs for VTE.  Ambulatory. Activity ad sara.   Educated on s/sx of pressure injury;  verbalized understanding.  Frequent position changes encouraged. Able to reposition in bed independently.  NADN. Resting quietly in bed.   Free of falls. Hourly rounding complete.   All safety measures remain in place. SR up x2; bed low & locked. Call light w/in reach.   Will continue to monitor throughout shift.Chart check complete.

## 2024-01-26 NOTE — PROGRESS NOTES
[facility]  Progress Note    Patient Name: Tamy Allan  MRN: [id number]  Patient Class: IP- Inpatient   Admission Date: 1/22/2024  Length of Stay: 4 days  Attending Physician: Kyleigh Moore,*  Primary Care Provider: Irma Ro MD        Subjective:     Principal Problem:Abdominal pain        HPI:  Tamy Allan is a 78 y.o. female with a PMH  has a past medical history of Acquired hypothyroidism, Amblyopia, Arthritis, Benign hypertension, Cardiomegaly, Cataract, Diabetes mellitus, type 2, Encounter for blood transfusion, Encounter for general adult medical examination without abnormal findings (08/31/2016), Hyperlipidemia, Hyperlipidemia, Hypertension, Refractive error, Type 2 diabetes mellitus, and Vitamin D deficiency. who presented to the ED for further evaluation of worsening abdominal pain greatest in her left lower quadrant radiating to her back x3 weeks duration.  Pain is described as sharp/stabbing in nature, constant, radiating to her back and currently rated 4/10 in severity however was 10/10 in severity at its worst.  She reported no known alleviating factors with aggravating factors including movement and palpation to the affected area.  Associated symptoms included decreased p.o. intake over the past 3 weeks, unknown unintentional weight loss, increased daytime sleepiness, nausea and vomiting, and diarrhea which began earlier today.  She denied endorsing any lightheadedness, dizziness, headache, visual changes, fever, chills, sweats, chest pain, shortness of breath, dysuria, hematuria, melena, hematochezia, or onset neurological deficits.  Patient reported recently being diagnosed with UTI but did not finish her antibiotics. Patient's son also reported she has been confused and not moved around with a past week.  Initial workup in the ED revealed patient had leukocytosis of 21.73, H/H of 9.7/30.6, renal impairment with Cr/GFR 1.4/39, and CT  chest/abdomen/pelvis revealing multiple pulmonary nodules as well as multiple hepatic lesions concerning for underlying malignancy/neoplasm.  Lumbar x-ray in setting of lower back pain negative for acute fractures.  Patient denies history of smoking, alcohol abuse, or exposure to chemicals/pesticides however did report strong family history on extended family with her cousins having breast/ cancers. Cancer screening reported to be up-to-date and negative. Patient admitted to Hospital Medicine inpatient for continue malignancy workup while awaiting further evaluation/recommendations from Hematology/Oncology.       PCP: Irma Ro      Overview/Hospital Course:  Tamy Allan is a 78 y.o. female with a PMH  has a past medical history of Acquired hypothyroidism, Amblyopia, Arthritis, Benign hypertension, Cardiomegaly, Cataract, Diabetes mellitus, type 2, Encounter for blood transfusion, Encounter for general adult medical examination without abnormal findings (08/31/2016), Hyperlipidemia, Hyperlipidemia, Hypertension, Refractive error, Type 2 diabetes mellitus, and Vitamin D deficiency. who presented to the ED for further evaluation of worsening abdominal pain greatest in her left lower quadrant radiating to her back x3 weeks duration.    Associated symptoms included decreased p.o. intake over the past 3 weeks, unknown unintentional weight loss, increased daytime sleepiness, nausea and vomiting, and diarrhea which began earlier today.  Patient reported recently being diagnosed with UTI but did not finish her antibiotics. Patient's son also reported she has been confused and not moved around with a past week.  CT chest/abdomen/pelvis revealing multiple pulmonary nodules as well as multiple hepatic lesions concerning for underlying malignancy/neoplasm.  Lumbar x-ray in setting of lower back pain negative for acute fractures.  Patient denies history of smoking, alcohol abuse, or exposure to  chemicals/pesticides however did report strong family history on extended family with her cousins having breast/ cancers. Cancer screening reported to be up-to-date and negative. Patient admitted to Hospital Medicine inpatient for continue malignancy workup with Heme-Onc follow up   Heme-Onc evaluated and recommended IR guided biopsy.  Appreciate Heme-Onc inputs;  Discussed with intervention radiologist Dr. Gaytan- recommended to continue to hold aspirin, Lovenox/blood thinners, status post IR guided liver biopsy on Thursday, 01/25/2024,;   ultrasound abdomen as of 1/26/24 showed- Multiple known liver masses suspicious for metastatic disease.  The gallbladder is abnormal in appearance with multiple gallstones as well as a 2.4 cm hypoechoic finding within it suspicious for mass.  Additionally there is some irregular gallbladder wall thickening.  Gallbladder is abnormal with gallstones as well as masslike findings which could represent a primary gallbladder malignancy.  Mild ascites.          Interval History:     No acute events overnight   Complaining of abdominal discomfort, ultrasound abdomen showed- Multiple known liver masses suspicious for metastatic disease.  The gallbladder is abnormal in appearance with multiple gallstones as well as a 2.4 cm hypoechoic finding within it suspicious for mass.  Additionally there is some irregular gallbladder wall thickening.  Gallbladder is abnormal with gallstones as well as masslike findings which could represent a primary gallbladder malignancy.  Mild ascites.   Complaining of ongoing diarrhea, follow up on stool studies, ordered 1 time Imodium, continue cholestyramine   Afebrile, persistent leukocytosis likely reactive from underlying suspected malignancy.        Review of Systems  Objective:     Vital Signs (Most Recent):  Temp: 98.1 °F (36.7 °C) (01/26/24 1139)  Pulse: 82 (01/26/24 1139)  Resp: 18 (01/26/24 1139)  BP: (!) 121/58 (01/26/24 1139)  SpO2: (!) 93 %  (01/26/24 1139) Vital Signs (24h Range):  Temp:  [96.5 °F (35.8 °C)-99 °F (37.2 °C)] 98.1 °F (36.7 °C)  Pulse:  [80-92] 82  Resp:  [14-21] 18  SpO2:  [87 %-97 %] 93 %  BP: (106-132)/(52-63) 121/58     Weight: 73.5 kg (162 lb 0.6 oz)  Body mass index is 30.62 kg/m².    Intake/Output Summary (Last 24 hours) at 1/26/2024 1309  Last data filed at 1/26/2024 0840  Gross per 24 hour   Intake --   Output 1 ml   Net -1 ml         Physical Exam        Constitutional:       General: She is not in acute distress.     Appearance: Normal appearance. She is normal weight. She is not ill-appearing, toxic-appearing or diaphoretic.   HENT:      Head: Normocephalic and atraumatic.      Right Ear: External ear normal.      Left Ear: External ear normal.      Nose: Nose normal. No congestion or rhinorrhea.      Mouth/Throat:      Mouth: Mucous membranes are moist.      Pharynx: Oropharynx is clear. No oropharyngeal exudate or posterior oropharyngeal erythema.   Eyes:      General: No scleral icterus.     Extraocular Movements: Extraocular movements intact.      Conjunctiva/sclera: Conjunctivae normal.      Pupils: Pupils are equal, round, and reactive to light.   Neck:      Vascular: No carotid bruit.   Cardiovascular:      Rate and Rhythm: Normal rate and regular rhythm.      Pulses: Normal pulses.      Heart sounds: Normal heart sounds. No murmur heard.     No friction rub. No gallop.   Pulmonary:      Effort: Pulmonary effort is normal. No respiratory distress.      Breath sounds: Normal breath sounds. No stridor. No wheezing, rhonchi or rales.   Chest:      Chest wall: No tenderness.   Abdominal:      General: Abdomen is flat. Bowel sounds are normal. There is no distension.      Palpations: Abdomen is soft.      Tenderness: There is abdominal tenderness. There is no right CVA tenderness, left CVA tenderness, guarding or rebound.      Hernia: No hernia is present.      Comments: Mild TTP throughout without evidence of guarding or  rebound tenderness noted   Musculoskeletal:         General: No swelling, tenderness, deformity or signs of injury. Normal range of motion.      Cervical back: Normal range of motion and neck supple. No rigidity or tenderness.      Right lower leg: No edema.      Left lower leg: No edema.   Lymphadenopathy:      Cervical: No cervical adenopathy.   Skin:     General: Skin is warm and dry.      Capillary Refill: Capillary refill takes less than 2 seconds.      Coloration: Skin is not jaundiced or pale.      Findings: No bruising, erythema, lesion or rash.   Neurological:      General: No focal deficit present.      Mental Status: She is alert and oriented to person, place, and time. Mental status is at baseline.      Cranial Nerves: No cranial nerve deficit.      Sensory: No sensory deficit.      Motor: No weakness.      Coordination: Coordination normal.   Psychiatric:         Mood and Affect: Mood normal.         Behavior: Behavior normal.         Thought Content: Thought content normal.         Judgment: Judgment normal.           Significant Labs: All pertinent labs within the past 24 hours have been reviewed.  CBC:   Recent Labs   Lab 01/25/24  0519 01/26/24  0758   WBC 18.87* 18.29*   HGB 8.7* 8.8*   HCT 28.3* 29.1*   * 472*     CMP:   Recent Labs   Lab 01/25/24  0519      K 3.4*   *   CO2 25   *   BUN 31*   CREATININE 1.6*   CALCIUM 7.8*   PROT 5.8*   ALBUMIN 1.8*   BILITOT 0.3   ALKPHOS 414*   AST 48*   ALT 15   ANIONGAP 4*       Significant Imaging:     Imaging Results              X-Ray Lumbar Spine Ap And Lateral (Final result)  Result time 01/23/24 02:05:07      Final result by Lisbeth Soria MD (01/23/24 02:05:07)                   Impression:      No acute fracture.      Electronically signed by: Lisbeth Soria  Date:    01/23/2024  Time:    02:05               Narrative:    EXAMINATION:  XR LUMBAR SPINE AP AND LATERAL    CLINICAL HISTORY:  lumbar back  pain;    TECHNIQUE:  AP, lateral and spot images were performed of the lumbar spine.    COMPARISON:  None    FINDINGS:  No acute fracture.  Minimal anterolisthesis at L4-5.  Moderate to advanced diffuse multilevel discogenic disease.  Advanced lower lumbar facet arthrosis.                                        CT Chest Without Contrast (Final result)  Result time 01/22/24 20:31:26      Final result by Ayush Moss MD (01/22/24 20:31:26)                   Impression:      Innumerable pulmonary nodules primarily concerning for metastatic disease.  Evaluation of number and size is limited by motion.    Small bilateral pleural effusions.    Trace pericardial effusion.    Complete findings as above.    This report was flagged in Epic as abnormal.    All CT scans at this facility are performed  using dose modulation techniques as appropriate to performed exam including the following:  automated exposure control; adjustment of mA and/or kV according to the patients size (this includes techniques or standardized protocols for targeted exams where dose is matched to indication/reason for exam: i.e. extremities or head);  iterative reconstruction technique.      Electronically signed by: Ayush Moss  Date:    01/22/2024  Time:    20:31               Narrative:    EXAMINATION:  CT CHEST WITHOUT CONTRAST    CLINICAL HISTORY:  Lymphadenopathy, chest or axilla;    TECHNIQUE:  Low dose axial images, sagittal and coronal reformations were obtained from the thoracic inlet to the lung bases. Contrast was not administered.    COMPARISON:  None    FINDINGS:  Base of Neck: No significant abnormality.    Thoracic soft tissues: Normal.    Aorta: Left-sided aortic arch.  No aneurysm and no significant atherosclerosis    Heart: Trace pericardial effusion.  Normal size.    Pulmonary vasculature: Pulmonary arteries distribute normally.  There are four pulmonary veins.    Rylee/Mediastinum: No pathologic mauri enlargement.    Airways:  Patent.    Lungs/Pleura: Small bilateral pleural effusions.  Innumerable small nodular opacities throughout the lungs.  Motion severely degrades fine parenchymal evaluation and accurate determination of number and size of nodules.  These would be presumed neoplastic and metastatic until proven otherwise.    Esophagus: Normal.    Bones: No acute fracture. No suspicious lytic or sclerotic lesions.  Osseous degenerative changes.                                        CT Abdomen Pelvis  Without Contrast (Final result)  Result time 01/22/24 18:34:22      Final result by Ayush Moss MD (01/22/24 18:34:22)                   Impression:      Innumerable hepatic lesions consistent with metastatic disease.  Additional retroperitoneal adenopathy.    At least small bilateral pleural effusions. Increased interstitial attenuation. Innumerable nodular pulmonary opacities in the lung bases favored related to neoplasm.    Small volume abdominopelvic ascites.    Complete findings as above.    This report was flagged in Epic as abnormal.    All CT scans at this facility are performed  using dose modulation techniques as appropriate to performed exam including the following:  automated exposure control; adjustment of mA and/or kV according to the patients size (this includes techniques or standardized protocols for targeted exams where dose is matched to indication/reason for exam: i.e. extremities or head);  iterative reconstruction technique.      Electronically signed by: Ayush Moss  Date:    01/22/2024  Time:    18:34               Narrative:    EXAMINATION:  CT ABDOMEN PELVIS WITHOUT CONTRAST    CLINICAL HISTORY:  Abdominal pain, acute, nonlocalized;    TECHNIQUE:  Low dose axial images, sagittal and coronal reformations were obtained from the lung bases to the pubic symphysis.  Contrast was not administered.    COMPARISON:  None    FINDINGS:  Heart: Normal in size. No pericardial effusion.    Lung Bases: At least small  bilateral pleural effusions.  Increased interstitial attenuation.  Innumerable nodular pulmonary opacities in the lung bases favored related to neoplasm    Liver: Multiple hepatic hypodensities most concerning for neoplasm.    Gallbladder: Irregularity along the gallbladder lumen possibly stones.    Bile Ducts: No evidence of dilated ducts.    Pancreas: No mass or peripancreatic fat stranding.    Spleen: Unremarkable.    Adrenals: Unremarkable.    Kidneys/ Ureters: Multiple renal hypodensities not all fully characterized but favoring simple cysts.  No hydronephrosis.  Nonobstructive left nephrolithiasis.    Bladder: No evidence of wall thickening.    Reproductive organs: Uterus is surgically absent.    GI Tract/Mesentery: Diverticulosis without diverticulitis.  No evidence of appendicitis.    Peritoneal Space: Small volume abdominopelvic ascites.  No free air.    Retroperitoneum: Retroperitoneal adenopathy.    Abdominal wall: Unremarkable.    Vasculature: No significant atherosclerosis or aneurysm.    Bones: No acute fracture.  Osseous degenerative changes.                                       Assessment/Plan:      * Abdominal pain        CKD (chronic kidney disease) stage 3, GFR 30-59 ml/min  Creatine stable for now. BMP reviewed- noted Estimated Creatinine Clearance: 30.7 mL/min (based on SCr of 1.4 mg/dL). according to latest data. Based on current GFR, CKD stage is stage 3 - GFR 30-59.  Monitor UOP and serial BMP and adjust therapy as needed. Renally dose meds. Avoid nephrotoxic medications and procedures.      Metastatic disease  Patient has evidence of metastatic cancer of unknown primary. The cancer has metastasized to liver and lungs.CT chest/abdomen/pelvis revealed evidence of numerous pulmonary nodules as well as numerous hepatic lesions.  Patient reports being up-to-date on cancer screenings. The patient is not under the care of an outpatient oncologist. The patient is not undergoing active chemotherapy.  Their staging information is listed below.   Cancer Staging   No matching staging information was found for the patient.  Plan:  -CEA, AFP,   -f/u hematology/oncology       Anemia in chronic kidney disease  Patient's anemia is currently  lower than last reported baseline of 11.4/35.4 . Has not received any PRBCs to date. Etiology likely d/t Iron deficiency and chronic disease due to Chronic Kidney Disease/ESRD in addition to underlying malignancy.   Current CBC reviewed-   Lab Results   Component Value Date    HGB 9.7 (L) 01/22/2024    HCT 30.6 (L) 01/22/2024     Monitor serial CBC and transfuse if patient becomes hemodynamically unstable, symptomatic or H/H drops below 7/21.      Gout  Not in acute flare.   Plan:  -continue to monitor       Hypothyroidism  Patient has chronic hypothyroidism. TFTs reviewed-   Lab Results   Component Value Date    TSH 0.688 10/03/2023   Plan:  -Will continue chronic levothyroxine and adjust for and clinical changes      Essential hypertension  Chronic, controlled. Latest blood pressure and vitals reviewed-     Temp:  [98.8 °F (37.1 °C)-98.9 °F (37.2 °C)]   Pulse:  [88-92]   Resp:  [18-20]   BP: (124-133)/(60-76)   SpO2:  [96 %-97 %] .   Home meds for hypertension were reviewed and noted below.   Hypertension Medications               amLODIPine (NORVASC) 5 MG tablet TAKE 1 TABLET ONE TIME DAILY    carvediloL (COREG) 25 MG tablet TAKE 1 TABLET TWICE DAILY WITH MEALS    hydrALAZINE (APRESOLINE) 10 MG tablet TAKE 1 TABLET THREE TIMES DAILY    losartan (COZAAR) 100 MG tablet TAKE 1 TABLET ONE TIME DAILY    nitroGLYCERIN (NITROSTAT) 0.3 MG SL tablet           While in the hospital, will manage blood pressure as follows; Continue home antihypertensive regimen    Will utilize p.r.n. blood pressure medication only if patient's blood pressure greater than 160/100 and she develops symptoms such as worsening chest pain or shortness of breath.      Coronary artery disease involving native  "coronary artery of native heart without angina pectoris  Patient with known CAD which is controlled Will continue ASA, Plavix, and Statin and monitor for S/Sx of angina/ACS. Continue to monitor on telemetry.       Hyperlipidemia  Patient is chronically on statin.will continue for now. Last Lipid Panel:   Lab Results   Component Value Date    CHOL 111 10/03/2023    HDL 29 (L) 10/03/2023    LDLCALC 54 10/03/2023    TRIG 138 10/03/2023   Plan:  -Continue home medication  -low fat/low calorie diet      Diabetes mellitus  Patient's FSGs are controlled on current medication regimen.  Last A1c reviewed-   Lab Results   Component Value Date    LABA1C 5.9 07/02/2020    HGBA1C 6.3 (H) 01/05/2024     Most recent fingerstick glucose reviewed- No results for input(s): "POCTGLUCOSE" in the last 24 hours.  Current correctional scale  Low  titrate as needed  anti-hyperglycemic dose as follows-   Antihyperglycemics (From admission, onward)      Start     Stop Route Frequency Ordered    01/22/24 2126  insulin aspart U-100 pen 0-5 Units         -- SubQ Before meals & nightly PRN 01/22/24 2027        Plan:  -SSI  -Accu-checks  -Hold oral hypoglycemics while patient is in the hospital  -Hypoglycemic protocol         VTE Risk Mitigation (From admission, onward)           Ordered     Place sequential compression device  Until discontinued         01/23/24 0830     IP VTE HIGH RISK PATIENT  Once         01/22/24 2027     Place sequential compression device  Until discontinued         01/22/24 2027                    Discharge Planning   NARDA:      Code Status: Full Code   Is the patient medically ready for discharge?:     Reason for patient still in hospital (select all that apply): Patient trending condition, Consult recommendations, and Pending disposition  Discharge Plan A: Home, Home with family                  Henrydevin Rosetta Moore MD  Department of Hospital Medicine   [facility] - Telemetry (Hospital)    "

## 2024-01-27 NOTE — PROGRESS NOTES
[facility] Medicine  Progress Note    Patient Name: Tamy Allan  MRN: [id number]  Patient Class: IP- Inpatient   Admission Date: 1/22/2024  Length of Stay: 5 days  Attending Physician: Kyleigh Moore,*  Primary Care Provider: Irma Ro MD        Subjective:     Principal Problem:Abdominal pain        HPI:  Tamy Allan is a 78 y.o. female with a PMH  has a past medical history of Acquired hypothyroidism, Amblyopia, Arthritis, Benign hypertension, Cardiomegaly, Cataract, Diabetes mellitus, type 2, Encounter for blood transfusion, Encounter for general adult medical examination without abnormal findings (08/31/2016), Hyperlipidemia, Hyperlipidemia, Hypertension, Refractive error, Type 2 diabetes mellitus, and Vitamin D deficiency. who presented to the ED for further evaluation of worsening abdominal pain greatest in her left lower quadrant radiating to her back x3 weeks duration.  Pain is described as sharp/stabbing in nature, constant, radiating to her back and currently rated 4/10 in severity however was 10/10 in severity at its worst.  She reported no known alleviating factors with aggravating factors including movement and palpation to the affected area.  Associated symptoms included decreased p.o. intake over the past 3 weeks, unknown unintentional weight loss, increased daytime sleepiness, nausea and vomiting, and diarrhea which began earlier today.  She denied endorsing any lightheadedness, dizziness, headache, visual changes, fever, chills, sweats, chest pain, shortness of breath, dysuria, hematuria, melena, hematochezia, or onset neurological deficits.  Patient reported recently being diagnosed with UTI but did not finish her antibiotics. Patient's son also reported she has been confused and not moved around with a past week.  Initial workup in the ED revealed patient had leukocytosis of 21.73, H/H of 9.7/30.6, renal impairment with Cr/GFR 1.4/39, and CT  chest/abdomen/pelvis revealing multiple pulmonary nodules as well as multiple hepatic lesions concerning for underlying malignancy/neoplasm.  Lumbar x-ray in setting of lower back pain negative for acute fractures.  Patient denies history of smoking, alcohol abuse, or exposure to chemicals/pesticides however did report strong family history on extended family with her cousins having breast/ cancers. Cancer screening reported to be up-to-date and negative. Patient admitted to Hospital Medicine inpatient for continue malignancy workup while awaiting further evaluation/recommendations from Hematology/Oncology.       PCP: Irma Ro      Overview/Hospital Course:  Tamy Allan is a 78 y.o. female with a PMH  has a past medical history of Acquired hypothyroidism, Amblyopia, Arthritis, Benign hypertension, Cardiomegaly, Cataract, Diabetes mellitus, type 2, Encounter for blood transfusion, Encounter for general adult medical examination without abnormal findings (08/31/2016), Hyperlipidemia, Hyperlipidemia, Hypertension, Refractive error, Type 2 diabetes mellitus, and Vitamin D deficiency. who presented to the ED for further evaluation of worsening abdominal pain greatest in her left lower quadrant radiating to her back x3 weeks duration.    Associated symptoms included decreased p.o. intake over the past 3 weeks, unknown unintentional weight loss, increased daytime sleepiness, nausea and vomiting, and diarrhea which began earlier today.  Patient reported recently being diagnosed with UTI but did not finish her antibiotics. Patient's son also reported she has been confused and not moved around with a past week.  CT chest/abdomen/pelvis revealing multiple pulmonary nodules as well as multiple hepatic lesions concerning for underlying malignancy/neoplasm.  Lumbar x-ray in setting of lower back pain negative for acute fractures.  Patient denies history of smoking, alcohol abuse, or exposure to  chemicals/pesticides however did report strong family history on extended family with her cousins having breast/ cancers. Cancer screening reported to be up-to-date and negative. Patient admitted to Hospital Medicine inpatient for continue malignancy workup with Heme-Onc follow up   Heme-Onc evaluated and recommended IR guided biopsy.  Appreciate Heme-Onc inputs;  Discussed with intervention radiologist Dr. Gaytan- recommended to continue to hold aspirin, Lovenox/blood thinners, status post IR guided liver biopsy on Thursday, 01/25/2024,;   ultrasound abdomen as of 1/26/24 showed- Multiple known liver masses suspicious for metastatic disease.  The gallbladder is abnormal in appearance with multiple gallstones as well as a 2.4 cm hypoechoic finding within it suspicious for mass.  Additionally there is some irregular gallbladder wall thickening.  Gallbladder is abnormal with gallstones as well as masslike findings which could represent a primary gallbladder malignancy.  Mild ascites.          Interval History:     Complaining of low back pain, ordered CT lumbar spine did not show acute findings.    Afebrile, WBC slightly trended up, antibiotic regimen per ID, we will follow up.    Qualified for home oxygen,  follow up for home oxygen arrangement.        Review of Systems  Objective:     Vital Signs (Most Recent):  Temp: 98.4 °F (36.9 °C) (01/27/24 1156)  Pulse: 84 (01/27/24 1156)  Resp: 16 (01/27/24 1156)  BP: 124/60 (01/27/24 1156)  SpO2: (!) 92 % (01/27/24 1156) Vital Signs (24h Range):  Temp:  [97.2 °F (36.2 °C)-99.3 °F (37.4 °C)] 98.4 °F (36.9 °C)  Pulse:  [79-98] 84  Resp:  [14-32] 16  SpO2:  [92 %-96 %] 92 %  BP: (109-150)/(57-92) 124/60     Weight: 73.5 kg (162 lb 0.6 oz)  Body mass index is 30.62 kg/m².  No intake or output data in the 24 hours ending 01/27/24 1440      Physical Exam      Constitutional:       General: She is not in acute distress.     Appearance: Normal appearance. She is  normal weight. She is not ill-appearing, toxic-appearing or diaphoretic.   HENT:      Head: Normocephalic and atraumatic.      Right Ear: External ear normal.      Left Ear: External ear normal.      Nose: Nose normal. No congestion or rhinorrhea.      Mouth/Throat:      Mouth: Mucous membranes are moist.      Pharynx: Oropharynx is clear. No oropharyngeal exudate or posterior oropharyngeal erythema.   Eyes:      General: No scleral icterus.     Extraocular Movements: Extraocular movements intact.      Conjunctiva/sclera: Conjunctivae normal.      Pupils: Pupils are equal, round, and reactive to light.   Neck:      Vascular: No carotid bruit.   Cardiovascular:      Rate and Rhythm: Normal rate and regular rhythm.      Pulses: Normal pulses.      Heart sounds: Normal heart sounds. No murmur heard.     No friction rub. No gallop.   Pulmonary:      Effort: Pulmonary effort is normal. No respiratory distress.      Breath sounds: Normal breath sounds. No stridor. No wheezing, rhonchi or rales.   Chest:      Chest wall: No tenderness.   Abdominal:      General: Abdomen is flat. Bowel sounds are normal. There is no distension.      Palpations: Abdomen is soft.      Tenderness: There is abdominal tenderness. There is no right CVA tenderness, left CVA tenderness, guarding or rebound.      Hernia: No hernia is present.      Comments: Mild TTP throughout without evidence of guarding or rebound tenderness noted   Musculoskeletal:         General: No swelling, tenderness, deformity or signs of injury. Normal range of motion.      Cervical back: Normal range of motion and neck supple. No rigidity or tenderness.      Right lower leg: No edema.      Left lower leg: No edema.   Lymphadenopathy:      Cervical: No cervical adenopathy.   Skin:     General: Skin is warm and dry.      Capillary Refill: Capillary refill takes less than 2 seconds.      Coloration: Skin is not jaundiced or pale.      Findings: No bruising, erythema, lesion  or rash.   Neurological:      General: No focal deficit present.      Mental Status: She is alert and oriented to person, place, and time. Mental status is at baseline.      Cranial Nerves: No cranial nerve deficit.      Sensory: No sensory deficit.      Motor: No weakness.      Coordination: Coordination normal.   Psychiatric:         Mood and Affect: Mood normal.         Behavior: Behavior normal.         Thought Content: Thought content normal.         Judgment: Judgment normal.       Significant Labs: All pertinent labs within the past 24 hours have been reviewed.  CBC:   Recent Labs   Lab 01/26/24  0758 01/27/24  0811   WBC 18.29* 21.29*   HGB 8.8* 9.1*   HCT 29.1* 30.1*   * 489*     CMP:   Recent Labs   Lab 01/27/24  0811      K 3.7   *   CO2 20*   *   BUN 19   CREATININE 1.2   CALCIUM 7.8*   ANIONGAP 9       Significant Imaging:     Imaging Results              X-Ray Lumbar Spine Ap And Lateral (Final result)  Result time 01/23/24 02:05:07      Final result by Lisbeth Soria MD (01/23/24 02:05:07)                   Impression:      No acute fracture.      Electronically signed by: Lisbeth Soria  Date:    01/23/2024  Time:    02:05               Narrative:    EXAMINATION:  XR LUMBAR SPINE AP AND LATERAL    CLINICAL HISTORY:  lumbar back pain;    TECHNIQUE:  AP, lateral and spot images were performed of the lumbar spine.    COMPARISON:  None    FINDINGS:  No acute fracture.  Minimal anterolisthesis at L4-5.  Moderate to advanced diffuse multilevel discogenic disease.  Advanced lower lumbar facet arthrosis.                                        CT Chest Without Contrast (Final result)  Result time 01/22/24 20:31:26      Final result by Ayush Moss MD (01/22/24 20:31:26)                   Impression:      Innumerable pulmonary nodules primarily concerning for metastatic disease.  Evaluation of number and size is limited by motion.    Small bilateral pleural  effusions.    Trace pericardial effusion.    Complete findings as above.    This report was flagged in Epic as abnormal.    All CT scans at this facility are performed  using dose modulation techniques as appropriate to performed exam including the following:  automated exposure control; adjustment of mA and/or kV according to the patients size (this includes techniques or standardized protocols for targeted exams where dose is matched to indication/reason for exam: i.e. extremities or head);  iterative reconstruction technique.      Electronically signed by: Ayush Moss  Date:    01/22/2024  Time:    20:31               Narrative:    EXAMINATION:  CT CHEST WITHOUT CONTRAST    CLINICAL HISTORY:  Lymphadenopathy, chest or axilla;    TECHNIQUE:  Low dose axial images, sagittal and coronal reformations were obtained from the thoracic inlet to the lung bases. Contrast was not administered.    COMPARISON:  None    FINDINGS:  Base of Neck: No significant abnormality.    Thoracic soft tissues: Normal.    Aorta: Left-sided aortic arch.  No aneurysm and no significant atherosclerosis    Heart: Trace pericardial effusion.  Normal size.    Pulmonary vasculature: Pulmonary arteries distribute normally.  There are four pulmonary veins.    Rylee/Mediastinum: No pathologic mauri enlargement.    Airways: Patent.    Lungs/Pleura: Small bilateral pleural effusions.  Innumerable small nodular opacities throughout the lungs.  Motion severely degrades fine parenchymal evaluation and accurate determination of number and size of nodules.  These would be presumed neoplastic and metastatic until proven otherwise.    Esophagus: Normal.    Bones: No acute fracture. No suspicious lytic or sclerotic lesions.  Osseous degenerative changes.                                        CT Abdomen Pelvis  Without Contrast (Final result)  Result time 01/22/24 18:34:22      Final result by Ayush Moss MD (01/22/24 18:34:22)                    Impression:      Innumerable hepatic lesions consistent with metastatic disease.  Additional retroperitoneal adenopathy.    At least small bilateral pleural effusions. Increased interstitial attenuation. Innumerable nodular pulmonary opacities in the lung bases favored related to neoplasm.    Small volume abdominopelvic ascites.    Complete findings as above.    This report was flagged in Epic as abnormal.    All CT scans at this facility are performed  using dose modulation techniques as appropriate to performed exam including the following:  automated exposure control; adjustment of mA and/or kV according to the patients size (this includes techniques or standardized protocols for targeted exams where dose is matched to indication/reason for exam: i.e. extremities or head);  iterative reconstruction technique.      Electronically signed by: Ayush Moss  Date:    01/22/2024  Time:    18:34               Narrative:    EXAMINATION:  CT ABDOMEN PELVIS WITHOUT CONTRAST    CLINICAL HISTORY:  Abdominal pain, acute, nonlocalized;    TECHNIQUE:  Low dose axial images, sagittal and coronal reformations were obtained from the lung bases to the pubic symphysis.  Contrast was not administered.    COMPARISON:  None    FINDINGS:  Heart: Normal in size. No pericardial effusion.    Lung Bases: At least small bilateral pleural effusions.  Increased interstitial attenuation.  Innumerable nodular pulmonary opacities in the lung bases favored related to neoplasm    Liver: Multiple hepatic hypodensities most concerning for neoplasm.    Gallbladder: Irregularity along the gallbladder lumen possibly stones.    Bile Ducts: No evidence of dilated ducts.    Pancreas: No mass or peripancreatic fat stranding.    Spleen: Unremarkable.    Adrenals: Unremarkable.    Kidneys/ Ureters: Multiple renal hypodensities not all fully characterized but favoring simple cysts.  No hydronephrosis.  Nonobstructive left nephrolithiasis.    Bladder: No  evidence of wall thickening.    Reproductive organs: Uterus is surgically absent.    GI Tract/Mesentery: Diverticulosis without diverticulitis.  No evidence of appendicitis.    Peritoneal Space: Small volume abdominopelvic ascites.  No free air.    Retroperitoneum: Retroperitoneal adenopathy.    Abdominal wall: Unremarkable.    Vasculature: No significant atherosclerosis or aneurysm.    Bones: No acute fracture.  Osseous degenerative changes.                                       Assessment/Plan:      * Abdominal pain        CKD (chronic kidney disease) stage 3, GFR 30-59 ml/min  Creatine stable for now. BMP reviewed- noted Estimated Creatinine Clearance: 30.7 mL/min (based on SCr of 1.4 mg/dL). according to latest data. Based on current GFR, CKD stage is stage 3 - GFR 30-59.  Monitor UOP and serial BMP and adjust therapy as needed. Renally dose meds. Avoid nephrotoxic medications and procedures.      Metastatic disease  Patient has evidence of metastatic cancer of unknown primary. The cancer has metastasized to liver and lungs.CT chest/abdomen/pelvis revealed evidence of numerous pulmonary nodules as well as numerous hepatic lesions.  Patient reports being up-to-date on cancer screenings. The patient is not under the care of an outpatient oncologist. The patient is not undergoing active chemotherapy. Their staging information is listed below.   Cancer Staging   No matching staging information was found for the patient.  Plan:  -CEA, AFP,   -f/u hematology/oncology       Anemia in chronic kidney disease  Patient's anemia is currently  lower than last reported baseline of 11.4/35.4 . Has not received any PRBCs to date. Etiology likely d/t Iron deficiency and chronic disease due to Chronic Kidney Disease/ESRD in addition to underlying malignancy.   Current CBC reviewed-   Lab Results   Component Value Date    HGB 9.7 (L) 01/22/2024    HCT 30.6 (L) 01/22/2024     Monitor serial CBC and transfuse if patient becomes  hemodynamically unstable, symptomatic or H/H drops below 7/21.      Gout  Not in acute flare.   Plan:  -continue to monitor       Hypothyroidism  Patient has chronic hypothyroidism. TFTs reviewed-   Lab Results   Component Value Date    TSH 0.688 10/03/2023   Plan:  -Will continue chronic levothyroxine and adjust for and clinical changes      Essential hypertension  Chronic, controlled. Latest blood pressure and vitals reviewed-     Temp:  [98.8 °F (37.1 °C)-98.9 °F (37.2 °C)]   Pulse:  [88-92]   Resp:  [18-20]   BP: (124-133)/(60-76)   SpO2:  [96 %-97 %] .   Home meds for hypertension were reviewed and noted below.   Hypertension Medications               amLODIPine (NORVASC) 5 MG tablet TAKE 1 TABLET ONE TIME DAILY    carvediloL (COREG) 25 MG tablet TAKE 1 TABLET TWICE DAILY WITH MEALS    hydrALAZINE (APRESOLINE) 10 MG tablet TAKE 1 TABLET THREE TIMES DAILY    losartan (COZAAR) 100 MG tablet TAKE 1 TABLET ONE TIME DAILY    nitroGLYCERIN (NITROSTAT) 0.3 MG SL tablet           While in the hospital, will manage blood pressure as follows; Continue home antihypertensive regimen    Will utilize p.r.n. blood pressure medication only if patient's blood pressure greater than 160/100 and she develops symptoms such as worsening chest pain or shortness of breath.      Coronary artery disease involving native coronary artery of native heart without angina pectoris  Patient with known CAD which is controlled Will continue ASA, Plavix, and Statin and monitor for S/Sx of angina/ACS. Continue to monitor on telemetry.       Hyperlipidemia  Patient is chronically on statin.will continue for now. Last Lipid Panel:   Lab Results   Component Value Date    CHOL 111 10/03/2023    HDL 29 (L) 10/03/2023    LDLCALC 54 10/03/2023    TRIG 138 10/03/2023   Plan:  -Continue home medication  -low fat/low calorie diet      Diabetes mellitus  Patient's FSGs are controlled on current medication regimen.  Last A1c reviewed-   Lab Results  "  Component Value Date    LABA1C 5.9 07/02/2020    HGBA1C 6.3 (H) 01/05/2024     Most recent fingerstick glucose reviewed- No results for input(s): "POCTGLUCOSE" in the last 24 hours.  Current correctional scale  Low  titrate as needed  anti-hyperglycemic dose as follows-   Antihyperglycemics (From admission, onward)      Start     Stop Route Frequency Ordered    01/22/24 2126  insulin aspart U-100 pen 0-5 Units         -- SubQ Before meals & nightly PRN 01/22/24 2027        Plan:  -SSI  -Accu-checks  -Hold oral hypoglycemics while patient is in the hospital  -Hypoglycemic protocol         VTE Risk Mitigation (From admission, onward)           Ordered     Place sequential compression device  Until discontinued         01/23/24 0830     IP VTE HIGH RISK PATIENT  Once         01/22/24 2027     Place sequential compression device  Until discontinued         01/22/24 2027                    Discharge Planning   NARDA:      Code Status: Full Code   Is the patient medically ready for discharge?:     Reason for patient still in hospital (select all that apply): Patient trending condition, Consult recommendations, and Pending disposition  Discharge Plan A: Home, Home with family   Discharge Delays: None known at this time              Kyleigh Moore MD  Department of Hospital Medicine   [facility] - Telemetry ([facility])    "

## 2024-01-27 NOTE — ASSESSMENT & PLAN NOTE
Was started on empiric Zosyn /Zyvox- UA is normal  Chest CT scan -Lungs/Pleura: Small bilateral pleural effusions.  Innumerable small nodular opacities throughout the lungs.  Motion severely degrades fine parenchymal evaluation and accurate determination of number and size of nodules.  These would be presumed neoplastic and metastatic until proven otherwise.    Will plan to deescalate regime -stop Zyvox- no MRSA noted  Leucocytosis could be stress response -follow biopsy results     1/26- will switch to Rocephin , monitor wbc in AM

## 2024-01-27 NOTE — SUBJECTIVE & OBJECTIVE
Interval History:     Complaining of low back pain, CT lumbar spine did not show acute findings   Afebrile, still with leukocytosis, antibiotics adjusted per ID recommendation   Qualified for home oxygen,  follow up   Anticipate discharge in next 24-48 hours    Review of Systems  Objective:     Vital Signs (Most Recent):  Temp: 99 °F (37.2 °C) (01/27/24 0735)  Pulse: 90 (01/27/24 1050)  Resp: (!) 24 (01/27/24 1050)  BP: (!) 132/59 (01/27/24 1050)  SpO2: (!) 92 % (01/27/24 1050) Vital Signs (24h Range):  Temp:  [97.2 °F (36.2 °C)-99.3 °F (37.4 °C)] 99 °F (37.2 °C)  Pulse:  [79-98] 90  Resp:  [14-32] 24  SpO2:  [92 %-96 %] 92 %  BP: (109-150)/(57-92) 132/59     Weight: 73.5 kg (162 lb 0.6 oz)  Body mass index is 30.62 kg/m².  No intake or output data in the 24 hours ending 01/27/24 1134      Physical Exam      Constitutional:       General: She is not in acute distress.     Appearance: Normal appearance. She is normal weight. She is not ill-appearing, toxic-appearing or diaphoretic.   HENT:      Head: Normocephalic and atraumatic.      Right Ear: External ear normal.      Left Ear: External ear normal.      Nose: Nose normal. No congestion or rhinorrhea.      Mouth/Throat:      Mouth: Mucous membranes are moist.      Pharynx: Oropharynx is clear. No oropharyngeal exudate or posterior oropharyngeal erythema.   Eyes:      General: No scleral icterus.     Extraocular Movements: Extraocular movements intact.      Conjunctiva/sclera: Conjunctivae normal.      Pupils: Pupils are equal, round, and reactive to light.   Neck:      Vascular: No carotid bruit.   Cardiovascular:      Rate and Rhythm: Normal rate and regular rhythm.      Pulses: Normal pulses.      Heart sounds: Normal heart sounds. No murmur heard.     No friction rub. No gallop.   Pulmonary:      Effort: Pulmonary effort is normal. No respiratory distress.      Breath sounds: Normal breath sounds. No stridor. No wheezing, rhonchi or rales.   Chest:       Chest wall: No tenderness.   Abdominal:      General: Abdomen is flat. Bowel sounds are normal. There is no distension.      Palpations: Abdomen is soft.      Tenderness: There is abdominal tenderness. There is no right CVA tenderness, left CVA tenderness, guarding or rebound.      Hernia: No hernia is present.      Comments: Mild TTP throughout without evidence of guarding or rebound tenderness noted   Musculoskeletal:         General: No swelling, tenderness, deformity or signs of injury. Normal range of motion.      Cervical back: Normal range of motion and neck supple. No rigidity or tenderness.      Right lower leg: No edema.      Left lower leg: No edema.   Lymphadenopathy:      Cervical: No cervical adenopathy.   Skin:     General: Skin is warm and dry.      Capillary Refill: Capillary refill takes less than 2 seconds.      Coloration: Skin is not jaundiced or pale.      Findings: No bruising, erythema, lesion or rash.   Neurological:      General: No focal deficit present.      Mental Status: She is alert and oriented to person, place, and time. Mental status is at baseline.      Cranial Nerves: No cranial nerve deficit.      Sensory: No sensory deficit.      Motor: No weakness.      Coordination: Coordination normal.   Psychiatric:         Mood and Affect: Mood normal.         Behavior: Behavior normal.         Thought Content: Thought content normal.         Judgment: Judgment normal.          Significant Labs: All pertinent labs within the past 24 hours have been reviewed.  CBC:   Recent Labs   Lab 01/26/24  0758 01/27/24  0811   WBC 18.29* 21.29*   HGB 8.8* 9.1*   HCT 29.1* 30.1*   * 489*     CMP:   Recent Labs   Lab 01/27/24  0811      K 3.7   *   CO2 20*   *   BUN 19   CREATININE 1.2   CALCIUM 7.8*   ANIONGAP 9       Significant Imaging:     Imaging Results              X-Ray Lumbar Spine Ap And Lateral (Final result)  Result time 01/23/24 02:05:07      Final result by  Lisbeth Soria MD (01/23/24 02:05:07)                   Impression:      No acute fracture.      Electronically signed by: Lisbeth Soria  Date:    01/23/2024  Time:    02:05               Narrative:    EXAMINATION:  XR LUMBAR SPINE AP AND LATERAL    CLINICAL HISTORY:  lumbar back pain;    TECHNIQUE:  AP, lateral and spot images were performed of the lumbar spine.    COMPARISON:  None    FINDINGS:  No acute fracture.  Minimal anterolisthesis at L4-5.  Moderate to advanced diffuse multilevel discogenic disease.  Advanced lower lumbar facet arthrosis.                                        CT Chest Without Contrast (Final result)  Result time 01/22/24 20:31:26      Final result by Ayush Moss MD (01/22/24 20:31:26)                   Impression:      Innumerable pulmonary nodules primarily concerning for metastatic disease.  Evaluation of number and size is limited by motion.    Small bilateral pleural effusions.    Trace pericardial effusion.    Complete findings as above.    This report was flagged in Epic as abnormal.    All CT scans at this facility are performed  using dose modulation techniques as appropriate to performed exam including the following:  automated exposure control; adjustment of mA and/or kV according to the patients size (this includes techniques or standardized protocols for targeted exams where dose is matched to indication/reason for exam: i.e. extremities or head);  iterative reconstruction technique.      Electronically signed by: Ayush Moss  Date:    01/22/2024  Time:    20:31               Narrative:    EXAMINATION:  CT CHEST WITHOUT CONTRAST    CLINICAL HISTORY:  Lymphadenopathy, chest or axilla;    TECHNIQUE:  Low dose axial images, sagittal and coronal reformations were obtained from the thoracic inlet to the lung bases. Contrast was not administered.    COMPARISON:  None    FINDINGS:  Base of Neck: No significant abnormality.    Thoracic soft tissues:  Normal.    Aorta: Left-sided aortic arch.  No aneurysm and no significant atherosclerosis    Heart: Trace pericardial effusion.  Normal size.    Pulmonary vasculature: Pulmonary arteries distribute normally.  There are four pulmonary veins.    Rylee/Mediastinum: No pathologic mauri enlargement.    Airways: Patent.    Lungs/Pleura: Small bilateral pleural effusions.  Innumerable small nodular opacities throughout the lungs.  Motion severely degrades fine parenchymal evaluation and accurate determination of number and size of nodules.  These would be presumed neoplastic and metastatic until proven otherwise.    Esophagus: Normal.    Bones: No acute fracture. No suspicious lytic or sclerotic lesions.  Osseous degenerative changes.                                        CT Abdomen Pelvis  Without Contrast (Final result)  Result time 01/22/24 18:34:22      Final result by Ayush Moss MD (01/22/24 18:34:22)                   Impression:      Innumerable hepatic lesions consistent with metastatic disease.  Additional retroperitoneal adenopathy.    At least small bilateral pleural effusions. Increased interstitial attenuation. Innumerable nodular pulmonary opacities in the lung bases favored related to neoplasm.    Small volume abdominopelvic ascites.    Complete findings as above.    This report was flagged in Epic as abnormal.    All CT scans at this facility are performed  using dose modulation techniques as appropriate to performed exam including the following:  automated exposure control; adjustment of mA and/or kV according to the patients size (this includes techniques or standardized protocols for targeted exams where dose is matched to indication/reason for exam: i.e. extremities or head);  iterative reconstruction technique.      Electronically signed by: Ayush Moss  Date:    01/22/2024  Time:    18:34               Narrative:    EXAMINATION:  CT ABDOMEN PELVIS WITHOUT CONTRAST    CLINICAL  HISTORY:  Abdominal pain, acute, nonlocalized;    TECHNIQUE:  Low dose axial images, sagittal and coronal reformations were obtained from the lung bases to the pubic symphysis.  Contrast was not administered.    COMPARISON:  None    FINDINGS:  Heart: Normal in size. No pericardial effusion.    Lung Bases: At least small bilateral pleural effusions.  Increased interstitial attenuation.  Innumerable nodular pulmonary opacities in the lung bases favored related to neoplasm    Liver: Multiple hepatic hypodensities most concerning for neoplasm.    Gallbladder: Irregularity along the gallbladder lumen possibly stones.    Bile Ducts: No evidence of dilated ducts.    Pancreas: No mass or peripancreatic fat stranding.    Spleen: Unremarkable.    Adrenals: Unremarkable.    Kidneys/ Ureters: Multiple renal hypodensities not all fully characterized but favoring simple cysts.  No hydronephrosis.  Nonobstructive left nephrolithiasis.    Bladder: No evidence of wall thickening.    Reproductive organs: Uterus is surgically absent.    GI Tract/Mesentery: Diverticulosis without diverticulitis.  No evidence of appendicitis.    Peritoneal Space: Small volume abdominopelvic ascites.  No free air.    Retroperitoneum: Retroperitoneal adenopathy.    Abdominal wall: Unremarkable.    Vasculature: No significant atherosclerosis or aneurysm.    Bones: No acute fracture.  Osseous degenerative changes.

## 2024-01-27 NOTE — SUBJECTIVE & OBJECTIVE
Interval History:     Complaining of low back pain, ordered CT lumbar spine did not show acute findings.    Afebrile, WBC slightly trended up, antibiotic regimen per ID, we will follow up.    Qualified for home oxygen,  follow up for home oxygen arrangement.        Review of Systems  Objective:     Vital Signs (Most Recent):  Temp: 98.4 °F (36.9 °C) (01/27/24 1156)  Pulse: 84 (01/27/24 1156)  Resp: 16 (01/27/24 1156)  BP: 124/60 (01/27/24 1156)  SpO2: (!) 92 % (01/27/24 1156) Vital Signs (24h Range):  Temp:  [97.2 °F (36.2 °C)-99.3 °F (37.4 °C)] 98.4 °F (36.9 °C)  Pulse:  [79-98] 84  Resp:  [14-32] 16  SpO2:  [92 %-96 %] 92 %  BP: (109-150)/(57-92) 124/60     Weight: 73.5 kg (162 lb 0.6 oz)  Body mass index is 30.62 kg/m².  No intake or output data in the 24 hours ending 01/27/24 1440      Physical Exam      Constitutional:       General: She is not in acute distress.     Appearance: Normal appearance. She is normal weight. She is not ill-appearing, toxic-appearing or diaphoretic.   HENT:      Head: Normocephalic and atraumatic.      Right Ear: External ear normal.      Left Ear: External ear normal.      Nose: Nose normal. No congestion or rhinorrhea.      Mouth/Throat:      Mouth: Mucous membranes are moist.      Pharynx: Oropharynx is clear. No oropharyngeal exudate or posterior oropharyngeal erythema.   Eyes:      General: No scleral icterus.     Extraocular Movements: Extraocular movements intact.      Conjunctiva/sclera: Conjunctivae normal.      Pupils: Pupils are equal, round, and reactive to light.   Neck:      Vascular: No carotid bruit.   Cardiovascular:      Rate and Rhythm: Normal rate and regular rhythm.      Pulses: Normal pulses.      Heart sounds: Normal heart sounds. No murmur heard.     No friction rub. No gallop.   Pulmonary:      Effort: Pulmonary effort is normal. No respiratory distress.      Breath sounds: Normal breath sounds. No stridor. No wheezing, rhonchi or rales.   Chest:       Chest wall: No tenderness.   Abdominal:      General: Abdomen is flat. Bowel sounds are normal. There is no distension.      Palpations: Abdomen is soft.      Tenderness: There is abdominal tenderness. There is no right CVA tenderness, left CVA tenderness, guarding or rebound.      Hernia: No hernia is present.      Comments: Mild TTP throughout without evidence of guarding or rebound tenderness noted   Musculoskeletal:         General: No swelling, tenderness, deformity or signs of injury. Normal range of motion.      Cervical back: Normal range of motion and neck supple. No rigidity or tenderness.      Right lower leg: No edema.      Left lower leg: No edema.   Lymphadenopathy:      Cervical: No cervical adenopathy.   Skin:     General: Skin is warm and dry.      Capillary Refill: Capillary refill takes less than 2 seconds.      Coloration: Skin is not jaundiced or pale.      Findings: No bruising, erythema, lesion or rash.   Neurological:      General: No focal deficit present.      Mental Status: She is alert and oriented to person, place, and time. Mental status is at baseline.      Cranial Nerves: No cranial nerve deficit.      Sensory: No sensory deficit.      Motor: No weakness.      Coordination: Coordination normal.   Psychiatric:         Mood and Affect: Mood normal.         Behavior: Behavior normal.         Thought Content: Thought content normal.         Judgment: Judgment normal.       Significant Labs: All pertinent labs within the past 24 hours have been reviewed.  CBC:   Recent Labs   Lab 01/26/24  0758 01/27/24  0811   WBC 18.29* 21.29*   HGB 8.8* 9.1*   HCT 29.1* 30.1*   * 489*     CMP:   Recent Labs   Lab 01/27/24  0811      K 3.7   *   CO2 20*   *   BUN 19   CREATININE 1.2   CALCIUM 7.8*   ANIONGAP 9       Significant Imaging:     Imaging Results              X-Ray Lumbar Spine Ap And Lateral (Final result)  Result time 01/23/24 02:05:07      Final result by  Lisbeth Soria MD (01/23/24 02:05:07)                   Impression:      No acute fracture.      Electronically signed by: Lisbeth Soria  Date:    01/23/2024  Time:    02:05               Narrative:    EXAMINATION:  XR LUMBAR SPINE AP AND LATERAL    CLINICAL HISTORY:  lumbar back pain;    TECHNIQUE:  AP, lateral and spot images were performed of the lumbar spine.    COMPARISON:  None    FINDINGS:  No acute fracture.  Minimal anterolisthesis at L4-5.  Moderate to advanced diffuse multilevel discogenic disease.  Advanced lower lumbar facet arthrosis.                                        CT Chest Without Contrast (Final result)  Result time 01/22/24 20:31:26      Final result by Ayush Moss MD (01/22/24 20:31:26)                   Impression:      Innumerable pulmonary nodules primarily concerning for metastatic disease.  Evaluation of number and size is limited by motion.    Small bilateral pleural effusions.    Trace pericardial effusion.    Complete findings as above.    This report was flagged in Epic as abnormal.    All CT scans at this facility are performed  using dose modulation techniques as appropriate to performed exam including the following:  automated exposure control; adjustment of mA and/or kV according to the patients size (this includes techniques or standardized protocols for targeted exams where dose is matched to indication/reason for exam: i.e. extremities or head);  iterative reconstruction technique.      Electronically signed by: Ayush Moss  Date:    01/22/2024  Time:    20:31               Narrative:    EXAMINATION:  CT CHEST WITHOUT CONTRAST    CLINICAL HISTORY:  Lymphadenopathy, chest or axilla;    TECHNIQUE:  Low dose axial images, sagittal and coronal reformations were obtained from the thoracic inlet to the lung bases. Contrast was not administered.    COMPARISON:  None    FINDINGS:  Base of Neck: No significant abnormality.    Thoracic soft tissues:  Normal.    Aorta: Left-sided aortic arch.  No aneurysm and no significant atherosclerosis    Heart: Trace pericardial effusion.  Normal size.    Pulmonary vasculature: Pulmonary arteries distribute normally.  There are four pulmonary veins.    Rylee/Mediastinum: No pathologic mauri enlargement.    Airways: Patent.    Lungs/Pleura: Small bilateral pleural effusions.  Innumerable small nodular opacities throughout the lungs.  Motion severely degrades fine parenchymal evaluation and accurate determination of number and size of nodules.  These would be presumed neoplastic and metastatic until proven otherwise.    Esophagus: Normal.    Bones: No acute fracture. No suspicious lytic or sclerotic lesions.  Osseous degenerative changes.                                        CT Abdomen Pelvis  Without Contrast (Final result)  Result time 01/22/24 18:34:22      Final result by Ayush Moss MD (01/22/24 18:34:22)                   Impression:      Innumerable hepatic lesions consistent with metastatic disease.  Additional retroperitoneal adenopathy.    At least small bilateral pleural effusions. Increased interstitial attenuation. Innumerable nodular pulmonary opacities in the lung bases favored related to neoplasm.    Small volume abdominopelvic ascites.    Complete findings as above.    This report was flagged in Epic as abnormal.    All CT scans at this facility are performed  using dose modulation techniques as appropriate to performed exam including the following:  automated exposure control; adjustment of mA and/or kV according to the patients size (this includes techniques or standardized protocols for targeted exams where dose is matched to indication/reason for exam: i.e. extremities or head);  iterative reconstruction technique.      Electronically signed by: Ayush Moss  Date:    01/22/2024  Time:    18:34               Narrative:    EXAMINATION:  CT ABDOMEN PELVIS WITHOUT CONTRAST    CLINICAL  HISTORY:  Abdominal pain, acute, nonlocalized;    TECHNIQUE:  Low dose axial images, sagittal and coronal reformations were obtained from the lung bases to the pubic symphysis.  Contrast was not administered.    COMPARISON:  None    FINDINGS:  Heart: Normal in size. No pericardial effusion.    Lung Bases: At least small bilateral pleural effusions.  Increased interstitial attenuation.  Innumerable nodular pulmonary opacities in the lung bases favored related to neoplasm    Liver: Multiple hepatic hypodensities most concerning for neoplasm.    Gallbladder: Irregularity along the gallbladder lumen possibly stones.    Bile Ducts: No evidence of dilated ducts.    Pancreas: No mass or peripancreatic fat stranding.    Spleen: Unremarkable.    Adrenals: Unremarkable.    Kidneys/ Ureters: Multiple renal hypodensities not all fully characterized but favoring simple cysts.  No hydronephrosis.  Nonobstructive left nephrolithiasis.    Bladder: No evidence of wall thickening.    Reproductive organs: Uterus is surgically absent.    GI Tract/Mesentery: Diverticulosis without diverticulitis.  No evidence of appendicitis.    Peritoneal Space: Small volume abdominopelvic ascites.  No free air.    Retroperitoneum: Retroperitoneal adenopathy.    Abdominal wall: Unremarkable.    Vasculature: No significant atherosclerosis or aneurysm.    Bones: No acute fracture.  Osseous degenerative changes.

## 2024-01-27 NOTE — PROGRESS NOTES
[facility])  Infectious Disease  Progress Note    Patient Name: Tamy Allan  MRN: [id number]  Admission Date: 1/22/2024  Length of Stay: 5 days  Attending Physician: Kyleigh Moore,*  Primary Care Provider: Irma Ro MD    Isolation Status: No active isolations  Assessment/Plan:      ID  SIRS (systemic inflammatory response syndrome)  Was started on empiric Zosyn /Zyvox- UA is normal  Chest CT scan -Lungs/Pleura: Small bilateral pleural effusions.  Innumerable small nodular opacities throughout the lungs.  Motion severely degrades fine parenchymal evaluation and accurate determination of number and size of nodules.  These would be presumed neoplastic and metastatic until proven otherwise.    Will plan to deescalate regime -stop Zyvox- no MRSA noted  Leucocytosis could be stress response -follow biopsy results     1/26- will switch to Rocephin , monitor wbc in AM    Oncology  Metastatic disease  Follow IR for biopsy,oncology follow up  1/26- s/p IR guided biopsy - follow oncology    Anemia in chronic kidney disease  Will monitor ,transfuse as needed     Endocrine  Diabetes mellitus  Insulin regime as per primary team    GI  * Abdominal pain   Ultrasound of liver-  Multiple known liver masses suspicious for metastatic disease.  The gallbladder is abnormal in appearance with multiple gallstones as well as a 2.4 cm hypoechoic finding within it suspicious for mass.  Additionally there is some irregular gallbladder wall thickening.  Gallbladder is abnormal with gallstones as well as masslike findings which could represent a primary gallbladder malignancy.  Mild ascites.     Follow primary team        Anticipated Disposition:     Thank you for your consult. I will follow-up with patient. Please contact us if you have any additional questions.    Pilo Xie MD, [facility]  Infectious Disease  [facility])    Subjective:     Principal Problem:Abdominal pain    HPI: 78  year old woman with  past medical history of Acquired hypothyroidism, Amblyopia, Arthritis, Benign hypertension, Cardiomegaly, Cataract, Diabetes mellitus, who was admitted with abdominal pain.  She appears weak  Labs and imaging test -    Component      Latest Ref Rng 1/22/2024 1/23/2024 1/24/2024 1/25/2024   WBC      3.90 - 12.70 K/uL 21.73 (H)  19.46 (H)  19.00 (H)  18.87 (H)    -serum procal -0.52  CT chest/abdomen/pelvis revealing multiple pulmonary nodules as well as multiple hepatic lesions concerning for underlying malignancy/neoplasm.     Interval History:   78 year old woman with leucocytosis and multiple liver and lung lesions on imaging .  Family are in the room at this time.  Gall bladder ultrasound-  1/26/23  Multiple known liver masses suspicious for metastatic disease.  The gallbladder is abnormal in appearance with multiple gallstones as well as a 2.4 cm hypoechoic finding within it suspicious for mass.  Additionally there is some irregular gallbladder wall thickening.  Gallbladder is abnormal with gallstones as well as masslike findings which could represent a primary gallbladder malignancy.  Mild ascites.   Review of Systems   Constitutional:  Positive for activity change and appetite change. Negative for chills, diaphoresis, fatigue and fever.   Respiratory:  Negative for apnea, choking and chest tightness.    Cardiovascular:  Negative for chest pain.     Objective:     Vital Signs (Most Recent):  Temp: 98.6 °F (37 °C) (01/27/24 0031)  Pulse: 96 (01/27/24 0031)  Resp: 19 (01/27/24 0031)  BP: 138/73 (01/27/24 0031)  SpO2: (!) 93 % (01/27/24 0031) Vital Signs (24h Range):  Temp:  [97.2 °F (36.2 °C)-98.6 °F (37 °C)] 98.6 °F (37 °C)  Pulse:  [79-96] 96  Resp:  [14-19] 19  SpO2:  [93 %-96 %] 93 %  BP: (106-139)/(57-73) 138/73     Weight: 73.5 kg (162 lb 0.6 oz)  Body mass index is 30.62 kg/m².    Estimated Creatinine Clearance: 27 mL/min (A) (based on SCr of 1.6 mg/dL (H)).     Physical Exam  Vitals  "and nursing note reviewed.   HENT:      Head: Normocephalic.   Eyes:      Pupils: Pupils are equal, round, and reactive to light.   Cardiovascular:      Rate and Rhythm: Normal rate.   Pulmonary:      Effort: Pulmonary effort is normal.   Abdominal:      General: Abdomen is flat.   Musculoskeletal:      Cervical back: Normal range of motion.   Neurological:      General: No focal deficit present.      Mental Status: She is alert and oriented to person, place, and time.          Significant Labs: Blood Culture:   Recent Labs   Lab 01/22/24  1651 01/22/24  1652   LABBLOO No Growth to date  No Growth to date  No Growth to date  No Growth to date No Growth to date  No Growth to date  No Growth to date  No Growth to date     BMP: No results for input(s): "GLU", "NA", "K", "CL", "CO2", "BUN", "CREATININE", "CALCIUM", "MG" in the last 48 hours.  CBC:   Recent Labs   Lab 01/26/24  0758   WBC 18.29*   HGB 8.8*   HCT 29.1*   *     CMP: No results for input(s): "NA", "K", "CL", "CO2", "GLU", "BUN", "CREATININE", "CALCIUM", "PROT", "ALBUMIN", "BILITOT", "ALKPHOS", "AST", "ALT", "ANIONGAP", "EGFRNONAA" in the last 48 hours.    Invalid input(s): "ESTGFAFRICA"  All pertinent labs within the past 24 hours have been reviewed.    Significant Imaging: I have reviewed all pertinent imaging results/findings within the past 24 hours.  "

## 2024-01-27 NOTE — ASSESSMENT & PLAN NOTE
Ultrasound of liver-  Multiple known liver masses suspicious for metastatic disease.  The gallbladder is abnormal in appearance with multiple gallstones as well as a 2.4 cm hypoechoic finding within it suspicious for mass.  Additionally there is some irregular gallbladder wall thickening.  Gallbladder is abnormal with gallstones as well as masslike findings which could represent a primary gallbladder malignancy.  Mild ascites.     Follow primary team

## 2024-01-27 NOTE — PROGRESS NOTES
[facility] Medicine  Progress Note    Patient Name: Tamy Allan  MRN: [id number]  Patient Class: IP- Inpatient   Admission Date: 1/22/2024  Length of Stay: 5 days  Attending Physician: Kyleigh Moore,*  Primary Care Provider: Irma Ro MD        Subjective:     Principal Problem:Abdominal pain        HPI:  Tamy Allan is a 78 y.o. female with a PMH  has a past medical history of Acquired hypothyroidism, Amblyopia, Arthritis, Benign hypertension, Cardiomegaly, Cataract, Diabetes mellitus, type 2, Encounter for blood transfusion, Encounter for general adult medical examination without abnormal findings (08/31/2016), Hyperlipidemia, Hyperlipidemia, Hypertension, Refractive error, Type 2 diabetes mellitus, and Vitamin D deficiency. who presented to the ED for further evaluation of worsening abdominal pain greatest in her left lower quadrant radiating to her back x3 weeks duration.  Pain is described as sharp/stabbing in nature, constant, radiating to her back and currently rated 4/10 in severity however was 10/10 in severity at its worst.  She reported no known alleviating factors with aggravating factors including movement and palpation to the affected area.  Associated symptoms included decreased p.o. intake over the past 3 weeks, unknown unintentional weight loss, increased daytime sleepiness, nausea and vomiting, and diarrhea which began earlier today.  She denied endorsing any lightheadedness, dizziness, headache, visual changes, fever, chills, sweats, chest pain, shortness of breath, dysuria, hematuria, melena, hematochezia, or onset neurological deficits.  Patient reported recently being diagnosed with UTI but did not finish her antibiotics. Patient's son also reported she has been confused and not moved around with a past week.  Initial workup in the ED revealed patient had leukocytosis of 21.73, H/H of 9.7/30.6, renal impairment with Cr/GFR 1.4/39, and CT  chest/abdomen/pelvis revealing multiple pulmonary nodules as well as multiple hepatic lesions concerning for underlying malignancy/neoplasm.  Lumbar x-ray in setting of lower back pain negative for acute fractures.  Patient denies history of smoking, alcohol abuse, or exposure to chemicals/pesticides however did report strong family history on extended family with her cousins having breast/ cancers. Cancer screening reported to be up-to-date and negative. Patient admitted to Hospital Medicine inpatient for continue malignancy workup while awaiting further evaluation/recommendations from Hematology/Oncology.       PCP: Irma Ro      Overview/Hospital Course:  Tamy Allan is a 78 y.o. female with a PMH  has a past medical history of Acquired hypothyroidism, Amblyopia, Arthritis, Benign hypertension, Cardiomegaly, Cataract, Diabetes mellitus, type 2, Encounter for blood transfusion, Encounter for general adult medical examination without abnormal findings (08/31/2016), Hyperlipidemia, Hyperlipidemia, Hypertension, Refractive error, Type 2 diabetes mellitus, and Vitamin D deficiency. who presented to the ED for further evaluation of worsening abdominal pain greatest in her left lower quadrant radiating to her back x3 weeks duration.    Associated symptoms included decreased p.o. intake over the past 3 weeks, unknown unintentional weight loss, increased daytime sleepiness, nausea and vomiting, and diarrhea which began earlier today.  Patient reported recently being diagnosed with UTI but did not finish her antibiotics. Patient's son also reported she has been confused and not moved around with a past week.  CT chest/abdomen/pelvis revealing multiple pulmonary nodules as well as multiple hepatic lesions concerning for underlying malignancy/neoplasm.  Lumbar x-ray in setting of lower back pain negative for acute fractures.  Patient denies history of smoking, alcohol abuse, or exposure to  chemicals/pesticides however did report strong family history on extended family with her cousins having breast/ cancers. Cancer screening reported to be up-to-date and negative. Patient admitted to Hospital Medicine inpatient for continue malignancy workup with Heme-Onc follow up   Heme-Onc evaluated and recommended IR guided biopsy.  Appreciate Heme-Onc inputs;  Discussed with intervention radiologist Dr. Gaytan- recommended to continue to hold aspirin, Lovenox/blood thinners, status post IR guided liver biopsy on Thursday, 01/25/2024,;   ultrasound abdomen as of 1/26/24 showed- Multiple known liver masses suspicious for metastatic disease.  The gallbladder is abnormal in appearance with multiple gallstones as well as a 2.4 cm hypoechoic finding within it suspicious for mass.  Additionally there is some irregular gallbladder wall thickening.  Gallbladder is abnormal with gallstones as well as masslike findings which could represent a primary gallbladder malignancy.  Mild ascites.          Interval History:     Complaining of low back pain, CT lumbar spine did not show acute findings   Afebrile, still with leukocytosis, antibiotics adjusted per ID recommendation   Qualified for home oxygen,  follow up   Anticipate discharge in next 24-48 hours    Review of Systems  Objective:     Vital Signs (Most Recent):  Temp: 99 °F (37.2 °C) (01/27/24 0735)  Pulse: 90 (01/27/24 1050)  Resp: (!) 24 (01/27/24 1050)  BP: (!) 132/59 (01/27/24 1050)  SpO2: (!) 92 % (01/27/24 1050) Vital Signs (24h Range):  Temp:  [97.2 °F (36.2 °C)-99.3 °F (37.4 °C)] 99 °F (37.2 °C)  Pulse:  [79-98] 90  Resp:  [14-32] 24  SpO2:  [92 %-96 %] 92 %  BP: (109-150)/(57-92) 132/59     Weight: 73.5 kg (162 lb 0.6 oz)  Body mass index is 30.62 kg/m².  No intake or output data in the 24 hours ending 01/27/24 1134      Physical Exam      Constitutional:       General: She is not in acute distress.     Appearance: Normal appearance. She is  normal weight. She is not ill-appearing, toxic-appearing or diaphoretic.   HENT:      Head: Normocephalic and atraumatic.      Right Ear: External ear normal.      Left Ear: External ear normal.      Nose: Nose normal. No congestion or rhinorrhea.      Mouth/Throat:      Mouth: Mucous membranes are moist.      Pharynx: Oropharynx is clear. No oropharyngeal exudate or posterior oropharyngeal erythema.   Eyes:      General: No scleral icterus.     Extraocular Movements: Extraocular movements intact.      Conjunctiva/sclera: Conjunctivae normal.      Pupils: Pupils are equal, round, and reactive to light.   Neck:      Vascular: No carotid bruit.   Cardiovascular:      Rate and Rhythm: Normal rate and regular rhythm.      Pulses: Normal pulses.      Heart sounds: Normal heart sounds. No murmur heard.     No friction rub. No gallop.   Pulmonary:      Effort: Pulmonary effort is normal. No respiratory distress.      Breath sounds: Normal breath sounds. No stridor. No wheezing, rhonchi or rales.   Chest:      Chest wall: No tenderness.   Abdominal:      General: Abdomen is flat. Bowel sounds are normal. There is no distension.      Palpations: Abdomen is soft.      Tenderness: There is abdominal tenderness. There is no right CVA tenderness, left CVA tenderness, guarding or rebound.      Hernia: No hernia is present.      Comments: Mild TTP throughout without evidence of guarding or rebound tenderness noted   Musculoskeletal:         General: No swelling, tenderness, deformity or signs of injury. Normal range of motion.      Cervical back: Normal range of motion and neck supple. No rigidity or tenderness.      Right lower leg: No edema.      Left lower leg: No edema.   Lymphadenopathy:      Cervical: No cervical adenopathy.   Skin:     General: Skin is warm and dry.      Capillary Refill: Capillary refill takes less than 2 seconds.      Coloration: Skin is not jaundiced or pale.      Findings: No bruising, erythema, lesion  or rash.   Neurological:      General: No focal deficit present.      Mental Status: She is alert and oriented to person, place, and time. Mental status is at baseline.      Cranial Nerves: No cranial nerve deficit.      Sensory: No sensory deficit.      Motor: No weakness.      Coordination: Coordination normal.   Psychiatric:         Mood and Affect: Mood normal.         Behavior: Behavior normal.         Thought Content: Thought content normal.         Judgment: Judgment normal.          Significant Labs: All pertinent labs within the past 24 hours have been reviewed.  CBC:   Recent Labs   Lab 01/26/24  0758 01/27/24  0811   WBC 18.29* 21.29*   HGB 8.8* 9.1*   HCT 29.1* 30.1*   * 489*     CMP:   Recent Labs   Lab 01/27/24  0811      K 3.7   *   CO2 20*   *   BUN 19   CREATININE 1.2   CALCIUM 7.8*   ANIONGAP 9       Significant Imaging:     Imaging Results              X-Ray Lumbar Spine Ap And Lateral (Final result)  Result time 01/23/24 02:05:07      Final result by Lisbeth Soria MD (01/23/24 02:05:07)                   Impression:      No acute fracture.      Electronically signed by: Lisbeth Soria  Date:    01/23/2024  Time:    02:05               Narrative:    EXAMINATION:  XR LUMBAR SPINE AP AND LATERAL    CLINICAL HISTORY:  lumbar back pain;    TECHNIQUE:  AP, lateral and spot images were performed of the lumbar spine.    COMPARISON:  None    FINDINGS:  No acute fracture.  Minimal anterolisthesis at L4-5.  Moderate to advanced diffuse multilevel discogenic disease.  Advanced lower lumbar facet arthrosis.                                        CT Chest Without Contrast (Final result)  Result time 01/22/24 20:31:26      Final result by Ayush Moss MD (01/22/24 20:31:26)                   Impression:      Innumerable pulmonary nodules primarily concerning for metastatic disease.  Evaluation of number and size is limited by motion.    Small bilateral pleural  effusions.    Trace pericardial effusion.    Complete findings as above.    This report was flagged in Epic as abnormal.    All CT scans at this facility are performed  using dose modulation techniques as appropriate to performed exam including the following:  automated exposure control; adjustment of mA and/or kV according to the patients size (this includes techniques or standardized protocols for targeted exams where dose is matched to indication/reason for exam: i.e. extremities or head);  iterative reconstruction technique.      Electronically signed by: Ayush Moss  Date:    01/22/2024  Time:    20:31               Narrative:    EXAMINATION:  CT CHEST WITHOUT CONTRAST    CLINICAL HISTORY:  Lymphadenopathy, chest or axilla;    TECHNIQUE:  Low dose axial images, sagittal and coronal reformations were obtained from the thoracic inlet to the lung bases. Contrast was not administered.    COMPARISON:  None    FINDINGS:  Base of Neck: No significant abnormality.    Thoracic soft tissues: Normal.    Aorta: Left-sided aortic arch.  No aneurysm and no significant atherosclerosis    Heart: Trace pericardial effusion.  Normal size.    Pulmonary vasculature: Pulmonary arteries distribute normally.  There are four pulmonary veins.    Rylee/Mediastinum: No pathologic mauri enlargement.    Airways: Patent.    Lungs/Pleura: Small bilateral pleural effusions.  Innumerable small nodular opacities throughout the lungs.  Motion severely degrades fine parenchymal evaluation and accurate determination of number and size of nodules.  These would be presumed neoplastic and metastatic until proven otherwise.    Esophagus: Normal.    Bones: No acute fracture. No suspicious lytic or sclerotic lesions.  Osseous degenerative changes.                                        CT Abdomen Pelvis  Without Contrast (Final result)  Result time 01/22/24 18:34:22      Final result by Ayush Moss MD (01/22/24 18:34:22)                    Impression:      Innumerable hepatic lesions consistent with metastatic disease.  Additional retroperitoneal adenopathy.    At least small bilateral pleural effusions. Increased interstitial attenuation. Innumerable nodular pulmonary opacities in the lung bases favored related to neoplasm.    Small volume abdominopelvic ascites.    Complete findings as above.    This report was flagged in Epic as abnormal.    All CT scans at this facility are performed  using dose modulation techniques as appropriate to performed exam including the following:  automated exposure control; adjustment of mA and/or kV according to the patients size (this includes techniques or standardized protocols for targeted exams where dose is matched to indication/reason for exam: i.e. extremities or head);  iterative reconstruction technique.      Electronically signed by: Ayush Moss  Date:    01/22/2024  Time:    18:34               Narrative:    EXAMINATION:  CT ABDOMEN PELVIS WITHOUT CONTRAST    CLINICAL HISTORY:  Abdominal pain, acute, nonlocalized;    TECHNIQUE:  Low dose axial images, sagittal and coronal reformations were obtained from the lung bases to the pubic symphysis.  Contrast was not administered.    COMPARISON:  None    FINDINGS:  Heart: Normal in size. No pericardial effusion.    Lung Bases: At least small bilateral pleural effusions.  Increased interstitial attenuation.  Innumerable nodular pulmonary opacities in the lung bases favored related to neoplasm    Liver: Multiple hepatic hypodensities most concerning for neoplasm.    Gallbladder: Irregularity along the gallbladder lumen possibly stones.    Bile Ducts: No evidence of dilated ducts.    Pancreas: No mass or peripancreatic fat stranding.    Spleen: Unremarkable.    Adrenals: Unremarkable.    Kidneys/ Ureters: Multiple renal hypodensities not all fully characterized but favoring simple cysts.  No hydronephrosis.  Nonobstructive left nephrolithiasis.    Bladder: No  evidence of wall thickening.    Reproductive organs: Uterus is surgically absent.    GI Tract/Mesentery: Diverticulosis without diverticulitis.  No evidence of appendicitis.    Peritoneal Space: Small volume abdominopelvic ascites.  No free air.    Retroperitoneum: Retroperitoneal adenopathy.    Abdominal wall: Unremarkable.    Vasculature: No significant atherosclerosis or aneurysm.    Bones: No acute fracture.  Osseous degenerative changes.                                       Assessment/Plan:      * Abdominal pain        CKD (chronic kidney disease) stage 3, GFR 30-59 ml/min  Creatine stable for now. BMP reviewed- noted Estimated Creatinine Clearance: 30.7 mL/min (based on SCr of 1.4 mg/dL). according to latest data. Based on current GFR, CKD stage is stage 3 - GFR 30-59.  Monitor UOP and serial BMP and adjust therapy as needed. Renally dose meds. Avoid nephrotoxic medications and procedures.      Metastatic disease  Patient has evidence of metastatic cancer of unknown primary. The cancer has metastasized to liver and lungs.CT chest/abdomen/pelvis revealed evidence of numerous pulmonary nodules as well as numerous hepatic lesions.  Patient reports being up-to-date on cancer screenings. The patient is not under the care of an outpatient oncologist. The patient is not undergoing active chemotherapy. Their staging information is listed below.   Cancer Staging   No matching staging information was found for the patient.  Plan:  -CEA, AFP,   -f/u hematology/oncology       Anemia in chronic kidney disease  Patient's anemia is currently  lower than last reported baseline of 11.4/35.4 . Has not received any PRBCs to date. Etiology likely d/t Iron deficiency and chronic disease due to Chronic Kidney Disease/ESRD in addition to underlying malignancy.   Current CBC reviewed-   Lab Results   Component Value Date    HGB 9.7 (L) 01/22/2024    HCT 30.6 (L) 01/22/2024     Monitor serial CBC and transfuse if patient becomes  hemodynamically unstable, symptomatic or H/H drops below 7/21.      Gout  Not in acute flare.   Plan:  -continue to monitor       Hypothyroidism  Patient has chronic hypothyroidism. TFTs reviewed-   Lab Results   Component Value Date    TSH 0.688 10/03/2023   Plan:  -Will continue chronic levothyroxine and adjust for and clinical changes      Essential hypertension  Chronic, controlled. Latest blood pressure and vitals reviewed-     Temp:  [98.8 °F (37.1 °C)-98.9 °F (37.2 °C)]   Pulse:  [88-92]   Resp:  [18-20]   BP: (124-133)/(60-76)   SpO2:  [96 %-97 %] .   Home meds for hypertension were reviewed and noted below.   Hypertension Medications               amLODIPine (NORVASC) 5 MG tablet TAKE 1 TABLET ONE TIME DAILY    carvediloL (COREG) 25 MG tablet TAKE 1 TABLET TWICE DAILY WITH MEALS    hydrALAZINE (APRESOLINE) 10 MG tablet TAKE 1 TABLET THREE TIMES DAILY    losartan (COZAAR) 100 MG tablet TAKE 1 TABLET ONE TIME DAILY    nitroGLYCERIN (NITROSTAT) 0.3 MG SL tablet           While in the hospital, will manage blood pressure as follows; Continue home antihypertensive regimen    Will utilize p.r.n. blood pressure medication only if patient's blood pressure greater than 160/100 and she develops symptoms such as worsening chest pain or shortness of breath.      Coronary artery disease involving native coronary artery of native heart without angina pectoris  Patient with known CAD which is controlled Will continue ASA, Plavix, and Statin and monitor for S/Sx of angina/ACS. Continue to monitor on telemetry.       Hyperlipidemia  Patient is chronically on statin.will continue for now. Last Lipid Panel:   Lab Results   Component Value Date    CHOL 111 10/03/2023    HDL 29 (L) 10/03/2023    LDLCALC 54 10/03/2023    TRIG 138 10/03/2023   Plan:  -Continue home medication  -low fat/low calorie diet      Diabetes mellitus  Patient's FSGs are controlled on current medication regimen.  Last A1c reviewed-   Lab Results  "  Component Value Date    LABA1C 5.9 07/02/2020    HGBA1C 6.3 (H) 01/05/2024     Most recent fingerstick glucose reviewed- No results for input(s): "POCTGLUCOSE" in the last 24 hours.  Current correctional scale  Low  titrate as needed  anti-hyperglycemic dose as follows-   Antihyperglycemics (From admission, onward)      Start     Stop Route Frequency Ordered    01/22/24 2126  insulin aspart U-100 pen 0-5 Units         -- SubQ Before meals & nightly PRN 01/22/24 2027        Plan:  -SSI  -Accu-checks  -Hold oral hypoglycemics while patient is in the hospital  -Hypoglycemic protocol         VTE Risk Mitigation (From admission, onward)           Ordered     Place sequential compression device  Until discontinued         01/23/24 0830     IP VTE HIGH RISK PATIENT  Once         01/22/24 2027     Place sequential compression device  Until discontinued         01/22/24 2027                    Discharge Planning   NARDA:      Code Status: Full Code   Is the patient medically ready for discharge?:     Reason for patient still in hospital (select all that apply): Patient trending condition, Consult recommendations, and Pending disposition  Discharge Plan A: Home, Home with family   Discharge Delays: None known at this time              Kyleigh Moore MD  Department of Hospital Medicine   [facility] - Telemetry ([facility])    "

## 2024-01-27 NOTE — SUBJECTIVE & OBJECTIVE
Interval History:   78 year old woman with leucocytosis and multiple liver and lung lesions on imaging .  Family are in the room at this time.  Gall bladder ultrasound-  1/26/23  Multiple known liver masses suspicious for metastatic disease.  The gallbladder is abnormal in appearance with multiple gallstones as well as a 2.4 cm hypoechoic finding within it suspicious for mass.  Additionally there is some irregular gallbladder wall thickening.  Gallbladder is abnormal with gallstones as well as masslike findings which could represent a primary gallbladder malignancy.  Mild ascites.   Review of Systems   Constitutional:  Positive for activity change and appetite change. Negative for chills, diaphoresis, fatigue and fever.   Respiratory:  Negative for apnea, choking and chest tightness.    Cardiovascular:  Negative for chest pain.     Objective:     Vital Signs (Most Recent):  Temp: 98.6 °F (37 °C) (01/27/24 0031)  Pulse: 96 (01/27/24 0031)  Resp: 19 (01/27/24 0031)  BP: 138/73 (01/27/24 0031)  SpO2: (!) 93 % (01/27/24 0031) Vital Signs (24h Range):  Temp:  [97.2 °F (36.2 °C)-98.6 °F (37 °C)] 98.6 °F (37 °C)  Pulse:  [79-96] 96  Resp:  [14-19] 19  SpO2:  [93 %-96 %] 93 %  BP: (106-139)/(57-73) 138/73     Weight: 73.5 kg (162 lb 0.6 oz)  Body mass index is 30.62 kg/m².    Estimated Creatinine Clearance: 27 mL/min (A) (based on SCr of 1.6 mg/dL (H)).     Physical Exam  Vitals and nursing note reviewed.   HENT:      Head: Normocephalic.   Eyes:      Pupils: Pupils are equal, round, and reactive to light.   Cardiovascular:      Rate and Rhythm: Normal rate.   Pulmonary:      Effort: Pulmonary effort is normal.   Abdominal:      General: Abdomen is flat.   Musculoskeletal:      Cervical back: Normal range of motion.   Neurological:      General: No focal deficit present.      Mental Status: She is alert and oriented to person, place, and time.          Significant Labs: Blood Culture:   Recent Labs   Lab 01/22/24  9044  "01/22/24  1652   LABBLOO No Growth to date  No Growth to date  No Growth to date  No Growth to date No Growth to date  No Growth to date  No Growth to date  No Growth to date     BMP: No results for input(s): "GLU", "NA", "K", "CL", "CO2", "BUN", "CREATININE", "CALCIUM", "MG" in the last 48 hours.  CBC:   Recent Labs   Lab 01/26/24  0758   WBC 18.29*   HGB 8.8*   HCT 29.1*   *     CMP: No results for input(s): "NA", "K", "CL", "CO2", "GLU", "BUN", "CREATININE", "CALCIUM", "PROT", "ALBUMIN", "BILITOT", "ALKPHOS", "AST", "ALT", "ANIONGAP", "EGFRNONAA" in the last 48 hours.    Invalid input(s): "ESTGFAFRICA"  All pertinent labs within the past 24 hours have been reviewed.    Significant Imaging: I have reviewed all pertinent imaging results/findings within the past 24 hours.  "

## 2024-01-27 NOTE — PLAN OF CARE
Discussed POC with pt, verbalized understanding.  Patient remains free from injury.  Safety and fall  precautions maintained.   Call light and personal belongings within reach, bed in lowest position with bed wheels locked.   No s/s of acute distress.  Purposeful rounding continued this shift.  Pain controlled per MD order.  IVF infusing.   Blood glucose monitoring continued this shift.   Diet orders continued, pt diet: cardiac diet   Vital signs continued per orders this shift.   Patient mobility status x1 assist bed alarm is set   Pt. Refused MRI last night. Anxiety got the best of her was given 1x dose of Ativan and pt still refused.   Chart and orders review completed. Pt education about care completed.

## 2024-01-27 NOTE — PLAN OF CARE
Updated patient on plan of care. Stool sample needed, hat in the toilet. IVF infusing. Instructed patient to use call light for assistance, call light in reach. Hourly rounding performed. Vitals q4 hours. Education provided, questions answered/encouraged. Chart check complete.     Problem: Adult Inpatient Plan of Care  Goal: Plan of Care Review  Outcome: Ongoing, Progressing

## 2024-01-27 NOTE — PLAN OF CARE
01/27/24 1144   Post-Acute Status   Post-Acute Authorization E   [facility] Status Referrals Sent     Referral for home oxygen sent to [facility] per patient request

## 2024-01-27 NOTE — PLAN OF CARE
Lengthy conversation with patient and family regarding possible discharge. Patient requesting discharge.  CM advised family that no orders for discharge at this time.

## 2024-01-27 NOTE — PLAN OF CARE
01/27/24 1229   Post-Acute Status   Post-Acute Authorization HME   HME Status Set-up Complete/Auth obtained     Equipment will be delivered to room by [email or web address].

## 2024-01-27 NOTE — PROGRESS NOTES
Patient verbalized she is very anxious to do MRI, Nicole MARTINEZ notified, ordered 1mg IV ativan x1. Gave ativan to patient, patient still very anxious and scared. Refused to do MRI. NP notified.

## 2024-01-28 NOTE — ASSESSMENT & PLAN NOTE
Was started on empiric Zosyn /Zyvox- UA is normal  Chest CT scan -Lungs/Pleura: Small bilateral pleural effusions.  Innumerable small nodular opacities throughout the lungs.  Motion severely degrades fine parenchymal evaluation and accurate determination of number and size of nodules.  These would be presumed neoplastic and metastatic until proven otherwise.    Will plan to deescalate regime -stop Zyvox- no MRSA noted  Leucocytosis could be stress response -follow biopsy results     1/26- will switch to Rocephin , monitor wbc in AM    1/27- wbc is now 21- will switch to Zosyn if this continues to increase , check stool assay and serum procal in AM

## 2024-01-28 NOTE — PLAN OF CARE
Initial PT eval completed. Patient SBA with bed mobility and SBA with gait x350 feet with RW. Recommend low intensity therapy.

## 2024-01-28 NOTE — DISCHARGE SUMMARY
[facility]  Discharge Summary      Patient Name: Tamy Allan  MRN: [id number]  [facility]: [id number]  Patient Class: IP- Inpatient  Admission Date: 1/22/2024  Hospital Length of Stay: 6 days  Discharge Date and Time: 1/28/24  Attending Physician: Kyleigh Moore,*   Discharging Provider: Kyleigh Moore MD  Primary Care Provider: Irma Ro MD    Primary Care Team: Networked reference to record PCT     HPI:   Tamy Allan is a 78 y.o. female with a PMH  has a past medical history of Acquired hypothyroidism, Amblyopia, Arthritis, Benign hypertension, Cardiomegaly, Cataract, Diabetes mellitus, type 2, Encounter for blood transfusion, Encounter for general adult medical examination without abnormal findings (08/31/2016), Hyperlipidemia, Hyperlipidemia, Hypertension, Refractive error, Type 2 diabetes mellitus, and Vitamin D deficiency. who presented to the ED for further evaluation of worsening abdominal pain greatest in her left lower quadrant radiating to her back x3 weeks duration.  Pain is described as sharp/stabbing in nature, constant, radiating to her back and currently rated 4/10 in severity however was 10/10 in severity at its worst.  She reported no known alleviating factors with aggravating factors including movement and palpation to the affected area.  Associated symptoms included decreased p.o. intake over the past 3 weeks, unknown unintentional weight loss, increased daytime sleepiness, nausea and vomiting, and diarrhea which began earlier today.  She denied endorsing any lightheadedness, dizziness, headache, visual changes, fever, chills, sweats, chest pain, shortness of breath, dysuria, hematuria, melena, hematochezia, or onset neurological deficits.  Patient reported recently being diagnosed with UTI but did not finish her antibiotics. Patient's son also reported she has been confused and not moved around with a past week.  Initial workup  in the ED revealed patient had leukocytosis of 21.73, H/H of 9.7/30.6, renal impairment with Cr/GFR 1.4/39, and CT chest/abdomen/pelvis revealing multiple pulmonary nodules as well as multiple hepatic lesions concerning for underlying malignancy/neoplasm.  Lumbar x-ray in setting of lower back pain negative for acute fractures.  Patient denies history of smoking, alcohol abuse, or exposure to chemicals/pesticides however did report strong family history on extended family with her cousins having breast/ cancers. Cancer screening reported to be up-to-date and negative. Patient admitted to Hospital Medicine inpatient for continue malignancy workup while awaiting further evaluation/recommendations from Hematology/Oncology.       PCP: Irma Ro      * No surgery found *      Hospital Course:   Tamy Allan is a 78 y.o. female with a PMH  has a past medical history of Acquired hypothyroidism, Amblyopia, Arthritis, Benign hypertension, Cardiomegaly, Cataract, Diabetes mellitus, type 2, Encounter for blood transfusion, Encounter for general adult medical examination without abnormal findings (08/31/2016), Hyperlipidemia, Hyperlipidemia, Hypertension, Refractive error, Type 2 diabetes mellitus, and Vitamin D deficiency. who presented to the ED for further evaluation of worsening abdominal pain greatest in her left lower quadrant radiating to her back x3 weeks duration.    Associated symptoms included decreased p.o. intake over the past 3 weeks, unknown unintentional weight loss, increased daytime sleepiness, nausea and vomiting, and diarrhea which began earlier today.  Patient reported recently being diagnosed with UTI but did not finish her antibiotics. Patient's son also reported she has been confused and not moved around with a past week.  CT chest/abdomen/pelvis revealing multiple pulmonary nodules as well as multiple hepatic lesions concerning for underlying malignancy/neoplasm.  Lumbar x-ray in setting  of lower back pain negative for acute fractures.  Patient denies history of smoking, alcohol abuse, or exposure to chemicals/pesticides however did report strong family history on extended family with her cousins having breast/ cancers. Cancer screening reported to be up-to-date and negative. Patient admitted to Hospital Medicine inpatient for continue malignancy workup with Heme-Onc follow up   Heme-Onc evaluated and recommended IR guided biopsy.  Appreciate Heme-Onc inputs;  Discussed with intervention radiologist Dr. Gaytan- recommended to continue to hold aspirin, Lovenox/blood thinners, status post IR guided liver biopsy on Thursday, 01/25/2024,;   ultrasound abdomen as of 1/26/24 showed- Multiple known liver masses suspicious for metastatic disease.  The gallbladder is abnormal in appearance with multiple gallstones as well as a 2.4 cm hypoechoic finding within it suspicious for mass.  Additionally there is some irregular gallbladder wall thickening.  Gallbladder is abnormal with gallstones as well as masslike findings which could represent a primary gallbladder malignancy.  Mild ascites.    On 01/28/2024, examination done at bedside, appeared alert and oriented x3, denied acute issues overnight.  Denied fever, chills, chest pain, shortness O breath, nausea, vomiting, bowel or bladder issues.  Denied any further episodes of diarrhea.    Appeared hemodynamically stable, afebrile, WBC remained stable-- discussed with infectious disease Dr. Xie--procalcitonin trended down, stool culture positive for Campylobacter, repeat CT chest did not show acute findings, accordingly stated persistent leukocytosis likely reactive/inflammatory from underlying malignancy, partly from positive stool cultures,--accordingly recommended azithromycin for 7 days upon discharge, with outpatient follow up.    Status post liver biopsy on 01/25/2024, with results pending, provided referral for Heme-Onc upon discharge.    Considering  clinical and hemodynamic stability, planning to discharge patient today, emphasized noncompliance with medications, outpatient follow up with PCP, Heme-Onc, Infectious Disease upon discharge, patient/family expressed understanding, agreed to the plan.    Qualified for oxygen on exertion,  worked on arrangements.    Given patient's age, underlying comorbidities, recent new findings suspicious for malignant disease, had extensive discussion with patient/family at bedside regarding goals of care/code status,--stated full code for now,  stated that they would like to follow up on biopsy results , as of now stated that they are inclining to proceed with aggressive treatment if needed.  Frail patient with at risk for rapid deterioration, high-risk for readmission, ordered NP at home program, ordered home health.  (PT/OT eval and recommended low intensity therapy)     Goals of Care Treatment Preferences:  Code Status: Full Code      Consults:   Consults (From admission, onward)          Status Ordering Provider     Inpatient consult to Social Work  Once        Provider:  (Not yet assigned)    Completed ANJU CHILDS     Inpatient consult to Infectious Diseases  Once        Provider:  Pilo Xie MD, FIDSA    Acknowledged ANJU CHILDS     Inpatient consult to Interventional Radiology  Once        Provider:  Mitra Eduardo MD    Completed ANJU CHILDS     Inpatient consult to Hematology/Oncology  Once        Provider:  Mitra Eduardo MD    Completed JB PALACIO            No new Assessment & Plan notes have been filed under this hospital service since the last note was generated.  Service: Hospital Medicine    Final Active Diagnoses:    Diagnosis Date Noted POA    PRINCIPAL PROBLEM:  Abdominal pain [R10.9] 01/22/2024 Yes    SIRS (systemic inflammatory response syndrome) [R65.10] 01/26/2024 Yes    Metastatic disease [C79.9] 01/22/2024 Yes    CKD (chronic kidney  "disease) stage 3, GFR 30-59 ml/min [N18.30] 01/22/2024 Yes    Anemia in chronic kidney disease [N18.9, D63.1] 11/29/2021 Yes    Gout [M10.9] 06/13/2019 Yes    Hypothyroidism [E03.9] 05/15/2019 Yes    Essential hypertension [I10] 03/22/2018 Yes    Coronary artery disease involving native coronary artery of native heart without angina pectoris [I25.10] 03/22/2018 Yes    Hyperlipidemia [E78.5] 03/04/2018 Yes    Diabetes mellitus [E11.9] 07/09/2014 Yes      Problems Resolved During this Admission:       Discharged Condition: fair    Disposition: Home or Self Care    Follow Up:   Follow-up Information       Irma Ro MD Follow up in 1 week(s).    Specialty: Internal Medicine  Contact information:  [address]  [facility] [place] [address]  [phone]               Irma Ro MD Follow up in 1 week(s).    Specialty: Internal Medicine  Contact information:  [address] [facility] [address]  [phone]               Russel Rodriguez MD Follow up in 1 week(s).    Specialty: Hematology and Oncology  Contact information:  [address]  [place] [address] [phone]               Pilo Xie MD, [facility] Follow up in 1 week(s).    Specialties: Infectious Diseases, Hospitalist  Contact information:  [address] [facility] [id number] [phone]                           Patient Instructions:      OXYGEN FOR HOME USE     Order Specific Question Answer Comments   Liter Flow 2    Duration With activity    Qualifying Test Performed at: Activity    Oxygen saturation at rest 91    Oxygen saturation with activity 87    Oxygen saturation with activity on oxygen 94    Portable mode: continuous    Route nasal cannula    Device: home concentrator with portable tanks    Length of need (in months): 3 mos    Patient condition with qualifying saturation COPD    Height: 5' 1" (1.549 m)    Weight: 73.5 kg (162 lb 0.6 oz)    Alternative treatment measures have been tried or considered " and deemed clinically ineffective. Yes      Ambulatory referral/consult to Hematology / Oncology   Standing Status: Future   Referral Priority: Routine Referral Type: Consultation   Referral Reason: Specialty Services Required   Requested Specialty: Hematology and Oncology   Number of Visits Requested: 1     Ambulatory referral/consult to [facility]   Standing Status: Future   Referral Priority: Routine Referral Type: Consultation   Referral Reason: Specialty Services Required   Number of Visits Requested: 1       Significant Diagnostic Studies:     Results for orders placed or performed during the hospital encounter of 01/22/24   Blood Culture #1 **CANNOT BE ORDERED STAT**    Specimen: Peripheral, Hand, Left; Blood   Result Value Ref Range    Blood Culture, Routine No growth after 5 days.    Blood Culture #1 **CANNOT BE ORDERED STAT**    Specimen: Peripheral, Antecubital, Left; Blood   Result Value Ref Range    Blood Culture, Routine No growth after 5 days.    Influenza A & B by Molecular    Specimen: Nasopharyngeal Swab   Result Value Ref Range    Influenza A, Molecular Negative Negative    Influenza B, Molecular Negative Negative    Flu A & B Source Nasal swab    Stool culture    Specimen: Stool   Result Value Ref Range    Stool Culture (A)      CAMPYLOBACTER SPECIES  Included specific species antigen for C.jejuni, C.coli, C. uzair and C.   upsaliensis     CBC auto differential   Result Value Ref Range    WBC 21.73 (H) 3.90 - 12.70 K/uL    RBC 3.63 (L) 4.00 - 5.40 M/uL    Hemoglobin 9.7 (L) 12.0 - 16.0 g/dL    Hematocrit 30.6 (L) 37.0 - 48.5 %    MCV 84 82 - 98 fL    MCH 26.7 (L) 27.0 - 31.0 pg    MCHC 31.7 (L) 32.0 - 36.0 g/dL    RDW 14.1 11.5 - 14.5 %    Platelets 438 150 - 450 K/uL    MPV 9.6 9.2 - 12.9 fL    Immature Granulocytes 0.9 (H) 0.0 - 0.5 %    Gran # (ANC) 17.6 (H) 1.8 - 7.7 K/uL    Immature Grans (Abs) 0.20 (H) 0.00 - 0.04 K/uL    Lymph # 1.2 1.0 - 4.8 K/uL    Mono # 2.5 (H) 0.3 - 1.0  K/uL    Eos # 0.2 0.0 - 0.5 K/uL    Baso # 0.06 0.00 - 0.20 K/uL    nRBC 0 0 /100 WBC    Gran % 80.8 (H) 38.0 - 73.0 %    Lymph % 5.7 (L) 18.0 - 48.0 %    Mono % 11.5 4.0 - 15.0 %    Eosinophil % 0.8 0.0 - 8.0 %    Basophil % 0.3 0.0 - 1.9 %    Differential Method Automated    Comprehensive metabolic panel   Result Value Ref Range    Sodium 142 136 - 145 mmol/L    Potassium 3.6 3.5 - 5.1 mmol/L    Chloride 109 95 - 110 mmol/L    CO2 25 23 - 29 mmol/L    Glucose 145 (H) 70 - 110 mg/dL    BUN 32 (H) 8 - 23 mg/dL    Creatinine 1.4 0.5 - 1.4 mg/dL    Calcium 8.8 8.7 - 10.5 mg/dL    Total Protein 6.2 6.0 - 8.4 g/dL    Albumin 2.0 (L) 3.5 - 5.2 g/dL    Total Bilirubin 0.6 0.1 - 1.0 mg/dL    Alkaline Phosphatase 347 (H) 55 - 135 U/L    AST 52 (H) 10 - 40 U/L    ALT 14 10 - 44 U/L    eGFR 39 (A) >60 mL/min/1.73 m^2    Anion Gap 8 8 - 16 mmol/L   Lipase   Result Value Ref Range    Lipase 15 4 - 60 U/L   Urinalysis, Reflex to Urine Culture Urine, Catheterized    Specimen: Urine   Result Value Ref Range    Specimen UA Urine, Catheterized     Color, UA Yellow Yellow, Straw, Cecilia    Appearance, UA Hazy (A) Clear    pH, UA 6.0 5.0 - 8.0    Specific Gravity, UA 1.020 1.005 - 1.030    Protein, UA 1+ (A) Negative    Glucose, UA Negative Negative    Ketones, UA Negative Negative    Bilirubin (UA) Negative Negative    Occult Blood UA Negative Negative    Nitrite, UA Negative Negative    Urobilinogen, UA Negative <2.0 EU/dL    Leukocytes, UA Negative Negative   Lactic acid, plasma   Result Value Ref Range    Lactate (Lactic Acid) 0.6 0.5 - 2.2 mmol/L   CEA   Result Value Ref Range    CEA 2.0 0.0 - 5.0 ng/mL      Result Value Ref Range     162 (H) 0 - 30 U/mL   AFP tumor marker   Result Value Ref Range    AFP 11 (H) 0.0 - 8.4 ng/mL   Urinalysis Microscopic   Result Value Ref Range    RBC, UA 2 0 - 4 /hpf    WBC, UA 5 0 - 5 /hpf    Bacteria Rare None-Occ /hpf    Hyaline Casts, UA 1 0-1/lpf /lpf    Uric Acid Natalie, UA Few  None-Moderate    Microscopic Comment SEE COMMENT    Comprehensive Metabolic Panel (CMP)   Result Value Ref Range    Sodium 144 136 - 145 mmol/L    Potassium 3.7 3.5 - 5.1 mmol/L    Chloride 113 (H) 95 - 110 mmol/L    CO2 26 23 - 29 mmol/L    Glucose 92 70 - 110 mg/dL    BUN 33 (H) 8 - 23 mg/dL    Creatinine 1.4 0.5 - 1.4 mg/dL    Calcium 8.3 (L) 8.7 - 10.5 mg/dL    Total Protein 5.6 (L) 6.0 - 8.4 g/dL    Albumin 1.8 (L) 3.5 - 5.2 g/dL    Total Bilirubin 0.5 0.1 - 1.0 mg/dL    Alkaline Phosphatase 318 (H) 55 - 135 U/L    AST 42 (H) 10 - 40 U/L    ALT 13 10 - 44 U/L    eGFR 39 (A) >60 mL/min/1.73 m^2    Anion Gap 5 (L) 8 - 16 mmol/L   CBC with Automated Differential   Result Value Ref Range    WBC 19.46 (H) 3.90 - 12.70 K/uL    RBC 3.25 (L) 4.00 - 5.40 M/uL    Hemoglobin 8.8 (L) 12.0 - 16.0 g/dL    Hematocrit 27.9 (L) 37.0 - 48.5 %    MCV 86 82 - 98 fL    MCH 27.1 27.0 - 31.0 pg    MCHC 31.5 (L) 32.0 - 36.0 g/dL    RDW 14.3 11.5 - 14.5 %    Platelets 402 150 - 450 K/uL    MPV 9.7 9.2 - 12.9 fL    Immature Granulocytes 1.0 (H) 0.0 - 0.5 %    Gran # (ANC) 14.9 (H) 1.8 - 7.7 K/uL    Immature Grans (Abs) 0.19 (H) 0.00 - 0.04 K/uL    Lymph # 1.6 1.0 - 4.8 K/uL    Mono # 2.4 (H) 0.3 - 1.0 K/uL    Eos # 0.3 0.0 - 0.5 K/uL    Baso # 0.06 0.00 - 0.20 K/uL    nRBC 0 0 /100 WBC    Gran % 76.4 (H) 38.0 - 73.0 %    Lymph % 8.4 (L) 18.0 - 48.0 %    Mono % 12.5 4.0 - 15.0 %    Eosinophil % 1.4 0.0 - 8.0 %    Basophil % 0.3 0.0 - 1.9 %    Differential Method Automated    COVID-19 Rapid Screening   Result Value Ref Range    SARS-CoV-2 RNA, Amplification, Qual Negative Negative   Comprehensive Metabolic Panel (CMP)   Result Value Ref Range    Sodium 142 136 - 145 mmol/L    Potassium 3.7 3.5 - 5.1 mmol/L    Chloride 112 (H) 95 - 110 mmol/L    CO2 24 23 - 29 mmol/L    Glucose 95 70 - 110 mg/dL    BUN 35 (H) 8 - 23 mg/dL    Creatinine 1.7 (H) 0.5 - 1.4 mg/dL    Calcium 8.0 (L) 8.7 - 10.5 mg/dL    Total Protein 5.7 (L) 6.0 - 8.4 g/dL     Albumin 1.8 (L) 3.5 - 5.2 g/dL    Total Bilirubin 0.4 0.1 - 1.0 mg/dL    Alkaline Phosphatase 401 (H) 55 - 135 U/L    AST 45 (H) 10 - 40 U/L    ALT 13 10 - 44 U/L    eGFR 31 (A) >60 mL/min/1.73 m^2    Anion Gap 6 (L) 8 - 16 mmol/L   CBC with Automated Differential   Result Value Ref Range    WBC 19.00 (H) 3.90 - 12.70 K/uL    RBC 3.29 (L) 4.00 - 5.40 M/uL    Hemoglobin 8.8 (L) 12.0 - 16.0 g/dL    Hematocrit 27.9 (L) 37.0 - 48.5 %    MCV 85 82 - 98 fL    MCH 26.7 (L) 27.0 - 31.0 pg    MCHC 31.5 (L) 32.0 - 36.0 g/dL    RDW 14.3 11.5 - 14.5 %    Platelets 426 150 - 450 K/uL    MPV 9.9 9.2 - 12.9 fL    Immature Granulocytes 0.9 (H) 0.0 - 0.5 %    Gran # (ANC) 14.9 (H) 1.8 - 7.7 K/uL    Immature Grans (Abs) 0.17 (H) 0.00 - 0.04 K/uL    Lymph # 1.4 1.0 - 4.8 K/uL    Mono # 2.2 (H) 0.3 - 1.0 K/uL    Eos # 0.3 0.0 - 0.5 K/uL    Baso # 0.08 0.00 - 0.20 K/uL    nRBC 0 0 /100 WBC    Gran % 78.3 (H) 38.0 - 73.0 %    Lymph % 7.4 (L) 18.0 - 48.0 %    Mono % 11.4 4.0 - 15.0 %    Eosinophil % 1.6 0.0 - 8.0 %    Basophil % 0.4 0.0 - 1.9 %    Differential Method Automated    Brain natriuretic peptide   Result Value Ref Range     (H) 0 - 99 pg/mL   Troponin I   Result Value Ref Range    Troponin I 0.008 0.000 - 0.026 ng/mL   Comprehensive Metabolic Panel (CMP)   Result Value Ref Range    Sodium 141 136 - 145 mmol/L    Potassium 3.4 (L) 3.5 - 5.1 mmol/L    Chloride 112 (H) 95 - 110 mmol/L    CO2 25 23 - 29 mmol/L    Glucose 123 (H) 70 - 110 mg/dL    BUN 31 (H) 8 - 23 mg/dL    Creatinine 1.6 (H) 0.5 - 1.4 mg/dL    Calcium 7.8 (L) 8.7 - 10.5 mg/dL    Total Protein 5.8 (L) 6.0 - 8.4 g/dL    Albumin 1.8 (L) 3.5 - 5.2 g/dL    Total Bilirubin 0.3 0.1 - 1.0 mg/dL    Alkaline Phosphatase 414 (H) 55 - 135 U/L    AST 48 (H) 10 - 40 U/L    ALT 15 10 - 44 U/L    eGFR 33 (A) >60 mL/min/1.73 m^2    Anion Gap 4 (L) 8 - 16 mmol/L   CBC with Automated Differential   Result Value Ref Range    WBC 18.87 (H) 3.90 - 12.70 K/uL    RBC 3.29 (L) 4.00 -  5.40 M/uL    Hemoglobin 8.7 (L) 12.0 - 16.0 g/dL    Hematocrit 28.3 (L) 37.0 - 48.5 %    MCV 86 82 - 98 fL    MCH 26.4 (L) 27.0 - 31.0 pg    MCHC 30.7 (L) 32.0 - 36.0 g/dL    RDW 14.3 11.5 - 14.5 %    Platelets 466 (H) 150 - 450 K/uL    MPV 9.6 9.2 - 12.9 fL    Immature Granulocytes 1.3 (H) 0.0 - 0.5 %    Gran # (ANC) 14.7 (H) 1.8 - 7.7 K/uL    Immature Grans (Abs) 0.24 (H) 0.00 - 0.04 K/uL    Lymph # 1.3 1.0 - 4.8 K/uL    Mono # 2.2 (H) 0.3 - 1.0 K/uL    Eos # 0.3 0.0 - 0.5 K/uL    Baso # 0.08 0.00 - 0.20 K/uL    nRBC 0 0 /100 WBC    Gran % 78.0 (H) 38.0 - 73.0 %    Lymph % 6.9 (L) 18.0 - 48.0 %    Mono % 11.8 4.0 - 15.0 %    Eosinophil % 1.6 0.0 - 8.0 %    Basophil % 0.4 0.0 - 1.9 %    Platelet Estimate Appears normal     Aniso Slight     Poik Slight     Ovalocytes Occasional     Differential Method Automated    Procalcitonin   Result Value Ref Range    Procalcitonin 0.52 (H) <0.25 ng/mL   Protime-INR (PT)   Result Value Ref Range    Prothrombin Time 12.1 9.0 - 12.5 sec    INR 1.2 0.8 - 1.2   CBC Auto Differential   Result Value Ref Range    WBC 18.29 (H) 3.90 - 12.70 K/uL    RBC 3.35 (L) 4.00 - 5.40 M/uL    Hemoglobin 8.8 (L) 12.0 - 16.0 g/dL    Hematocrit 29.1 (L) 37.0 - 48.5 %    MCV 87 82 - 98 fL    MCH 26.3 (L) 27.0 - 31.0 pg    MCHC 30.2 (L) 32.0 - 36.0 g/dL    RDW 14.3 11.5 - 14.5 %    Platelets 472 (H) 150 - 450 K/uL    MPV 9.6 9.2 - 12.9 fL    Immature Granulocytes 0.8 (H) 0.0 - 0.5 %    Gran # (ANC) 14.8 (H) 1.8 - 7.7 K/uL    Immature Grans (Abs) 0.15 (H) 0.00 - 0.04 K/uL    Lymph # 1.2 1.0 - 4.8 K/uL    Mono # 1.9 (H) 0.3 - 1.0 K/uL    Eos # 0.2 0.0 - 0.5 K/uL    Baso # 0.06 0.00 - 0.20 K/uL    nRBC 0 0 /100 WBC    Gran % 80.6 (H) 38.0 - 73.0 %    Lymph % 6.5 (L) 18.0 - 48.0 %    Mono % 10.6 4.0 - 15.0 %    Eosinophil % 1.2 0.0 - 8.0 %    Basophil % 0.3 0.0 - 1.9 %    Differential Method Automated    CBC Auto Differential   Result Value Ref Range    WBC 21.29 (H) 3.90 - 12.70 K/uL    RBC 3.46 (L) 4.00 -  5.40 M/uL    Hemoglobin 9.1 (L) 12.0 - 16.0 g/dL    Hematocrit 30.1 (L) 37.0 - 48.5 %    MCV 87 82 - 98 fL    MCH 26.3 (L) 27.0 - 31.0 pg    MCHC 30.2 (L) 32.0 - 36.0 g/dL    RDW 14.5 11.5 - 14.5 %    Platelets 489 (H) 150 - 450 K/uL    MPV 9.2 9.2 - 12.9 fL    Immature Granulocytes 1.0 (H) 0.0 - 0.5 %    Gran # (ANC) 17.1 (H) 1.8 - 7.7 K/uL    Immature Grans (Abs) 0.21 (H) 0.00 - 0.04 K/uL    Lymph # 1.4 1.0 - 4.8 K/uL    Mono # 2.2 (H) 0.3 - 1.0 K/uL    Eos # 0.3 0.0 - 0.5 K/uL    Baso # 0.08 0.00 - 0.20 K/uL    nRBC 0 0 /100 WBC    Gran % 80.4 (H) 38.0 - 73.0 %    Lymph % 6.5 (L) 18.0 - 48.0 %    Mono % 10.3 4.0 - 15.0 %    Eosinophil % 1.4 0.0 - 8.0 %    Basophil % 0.4 0.0 - 1.9 %    Platelet Estimate Appears normal     Aniso Slight     Poik Slight     Differential Method Automated    Basic Metabolic Panel   Result Value Ref Range    Sodium 145 136 - 145 mmol/L    Potassium 3.7 3.5 - 5.1 mmol/L    Chloride 116 (H) 95 - 110 mmol/L    CO2 20 (L) 23 - 29 mmol/L    Glucose 119 (H) 70 - 110 mg/dL    BUN 19 8 - 23 mg/dL    Creatinine 1.2 0.5 - 1.4 mg/dL    Calcium 7.8 (L) 8.7 - 10.5 mg/dL    Anion Gap 9 8 - 16 mmol/L    eGFR 47 (A) >60 mL/min/1.73 m^2   CBC Auto Differential   Result Value Ref Range    WBC 25.66 (H) 3.90 - 12.70 K/uL    RBC 3.72 (L) 4.00 - 5.40 M/uL    Hemoglobin 9.9 (L) 12.0 - 16.0 g/dL    Hematocrit 31.8 (L) 37.0 - 48.5 %    MCV 86 82 - 98 fL    MCH 26.6 (L) 27.0 - 31.0 pg    MCHC 31.1 (L) 32.0 - 36.0 g/dL    RDW 14.8 (H) 11.5 - 14.5 %    Platelets 570 (H) 150 - 450 K/uL    MPV 9.2 9.2 - 12.9 fL    Immature Granulocytes 4.2 (H) 0.0 - 0.5 %    Gran # (ANC) 19.7 (H) 1.8 - 7.7 K/uL    Immature Grans (Abs) 1.08 (H) 0.00 - 0.04 K/uL    Lymph # 1.5 1.0 - 4.8 K/uL    Mono # 2.8 (H) 0.3 - 1.0 K/uL    Eos # 0.4 0.0 - 0.5 K/uL    Baso # 0.21 (H) 0.00 - 0.20 K/uL    nRBC 0 0 /100 WBC    Gran % 76.8 (H) 38.0 - 73.0 %    Lymph % 5.8 (L) 18.0 - 48.0 %    Mono % 10.8 4.0 - 15.0 %    Eosinophil % 1.6 0.0 - 8.0 %     Basophil % 0.8 0.0 - 1.9 %    Platelet Estimate Increased (A)     Ovalocytes Occasional     Tear Drop Cells Occasional     Spherocytes Occasional     Schistocytes Present     Differential Method Automated    Basic Metabolic Panel   Result Value Ref Range    Sodium 147 (H) 136 - 145 mmol/L    Potassium 3.8 3.5 - 5.1 mmol/L    Chloride 117 (H) 95 - 110 mmol/L    CO2 19 (L) 23 - 29 mmol/L    Glucose 112 (H) 70 - 110 mg/dL    BUN 17 8 - 23 mg/dL    Creatinine 1.0 0.5 - 1.4 mg/dL    Calcium 8.0 (L) 8.7 - 10.5 mg/dL    Anion Gap 11 8 - 16 mmol/L    eGFR 58 (A) >60 mL/min/1.73 m^2   Ammonia   Result Value Ref Range    Ammonia 45 10 - 50 umol/L   Procalcitonin   Result Value Ref Range    Procalcitonin 0.46 (H) <0.25 ng/mL   CBC Auto Differential   Result Value Ref Range    WBC 21.94 (H) 3.90 - 12.70 K/uL    RBC 3.45 (L) 4.00 - 5.40 M/uL    Hemoglobin 9.1 (L) 12.0 - 16.0 g/dL    Hematocrit 29.9 (L) 37.0 - 48.5 %    MCV 87 82 - 98 fL    MCH 26.4 (L) 27.0 - 31.0 pg    MCHC 30.4 (L) 32.0 - 36.0 g/dL    RDW 14.6 (H) 11.5 - 14.5 %    Platelets 441 150 - 450 K/uL    MPV 9.1 (L) 9.2 - 12.9 fL    Immature Granulocytes 0.9 (H) 0.0 - 0.5 %    Gran # (ANC) 18.1 (H) 1.8 - 7.7 K/uL    Immature Grans (Abs) 0.19 (H) 0.00 - 0.04 K/uL    Lymph # 1.4 1.0 - 4.8 K/uL    Mono # 1.9 (H) 0.3 - 1.0 K/uL    Eos # 0.3 0.0 - 0.5 K/uL    Baso # 0.07 0.00 - 0.20 K/uL    nRBC 0 0 /100 WBC    Gran % 82.3 (H) 38.0 - 73.0 %    Lymph % 6.4 (L) 18.0 - 48.0 %    Mono % 8.8 4.0 - 15.0 %    Eosinophil % 1.3 0.0 - 8.0 %    Basophil % 0.3 0.0 - 1.9 %    Differential Method Automated    Echo   Result Value Ref Range    BSA 1.77 m2    LA WIDTH 3.8 cm    LVOT stroke volume 108.68 cm3    LVIDd 4.86 3.5 - 6.0 cm    LV Systolic Volume 37.87 mL    LV Systolic Volume Index 22.0 mL/m2    LVIDs 3.10 2.1 - 4.0 cm    LV Diastolic Volume 110.83 mL    LV Diastolic Volume Index 64.44 mL/m2    IVS 1.66 (A) 0.6 - 1.1 cm    LVOT diameter 2.29 cm    LVOT area 4.1 cm2    FS 36 28 -  44 %    Left Ventricle Relative Wall Thickness 0.62 cm    Posterior Wall 1.50 (A) 0.6 - 1.1 cm    LV mass 334.28 g    LV Mass Index 194 g/m2    MV Peak E Rodrick 0.68 m/s    TDI LATERAL 0.12 m/s    TDI SEPTAL 0.13 m/s    E/E' ratio 5.44 m/s    MV Peak A Rodrick 1.01 m/s    TR Max Rodrick 2.73 m/s    E/A ratio 0.67     IVRT 49.48 msec    E wave deceleration time 237.44 msec    LV SEPTAL E/E' RATIO 5.23 m/s    LA Volume Index 30.0 mL/m2    LV LATERAL E/E' RATIO 5.67 m/s    LA volume 51.65 cm3    LVOT peak rodrick 1.02 m/s    Left Ventricular Outflow Tract Mean Velocity 0.91 cm/s    Left Ventricular Outflow Tract Mean Gradient 3.33 mmHg    RV mid diameter 3.46 cm    RVOT peak VTI 16.5 cm    TAPSE 2.13 cm    LA size 3.38 cm    Left Atrium Minor Axis 4.59 cm    Left Atrium Major Axis 4.88 cm    RA Major Axis 4.86 cm    AV mean gradient 6 mmHg    AV peak gradient 10 mmHg    Ao peak rodrick 1.62 m/s    Ao VTI 34.50 cm    LVOT peak VTI 26.40 cm    AV valve area 3.15 cm²    AV Velocity Ratio 0.63     AV index (prosthetic) 0.77     CHLOE by Velocity Ratio 2.59 cm²    MV stenosis pressure 1/2 time 68.86 ms    MV valve area p 1/2 method 3.19 cm2    Triscuspid Valve Regurgitation Peak Gradient 30 mmHg    PV mean gradient 1 mmHg    RVOT peak rodrick 0.63 m/s    Ao root annulus 3.62 cm    STJ 3.54 cm    Ascending aorta 3.49 cm    IVC diameter 1.35 cm    Mean e' 0.13 m/s    ZLVIDS 0.38     ZLVIDD 0.19     RA Width 3.1 cm    TV resting pulmonary artery pressure 33 mmHg    RV TB RVSP 6 mmHg    Est. RA pres 3 mmHg   POCT glucose   Result Value Ref Range    POCT Glucose 91 70 - 110 mg/dL   POCT glucose   Result Value Ref Range    POCT Glucose 97 70 - 110 mg/dL   POCT glucose   Result Value Ref Range    POCT Glucose 104 70 - 110 mg/dL   POCT glucose   Result Value Ref Range    POCT Glucose 104 70 - 110 mg/dL   POCT glucose   Result Value Ref Range    POCT Glucose 115 (H) 70 - 110 mg/dL   POCT glucose   Result Value Ref Range    POCT Glucose 110 70 - 110 mg/dL    POCT glucose   Result Value Ref Range    POCT Glucose 115 (H) 70 - 110 mg/dL   POCT glucose   Result Value Ref Range    POCT Glucose 111 (H) 70 - 110 mg/dL   POCT glucose   Result Value Ref Range    POCT Glucose 121 (H) 70 - 110 mg/dL   POCT glucose   Result Value Ref Range    POCT Glucose 118 (H) 70 - 110 mg/dL   POCT glucose   Result Value Ref Range    POCT Glucose 105 70 - 110 mg/dL   POCT glucose   Result Value Ref Range    POCT Glucose 110 70 - 110 mg/dL   POCT glucose   Result Value Ref Range    POCT Glucose 106 70 - 110 mg/dL   POCT glucose   Result Value Ref Range    POCT Glucose 103 70 - 110 mg/dL   POCT glucose   Result Value Ref Range    POCT Glucose 116 (H) 70 - 110 mg/dL   POCT glucose   Result Value Ref Range    POCT Glucose 122 (H) 70 - 110 mg/dL   POCT glucose   Result Value Ref Range    POCT Glucose 128 (H) 70 - 110 mg/dL   POCT glucose   Result Value Ref Range    POCT Glucose 122 (H) 70 - 110 mg/dL   POCT glucose   Result Value Ref Range    POCT Glucose 124 (H) 70 - 110 mg/dL          Imaging Results              X-Ray Lumbar Spine Ap And Lateral (Final result)  Result time 01/23/24 02:05:07      Final result by Lisbeth Soria MD (01/23/24 02:05:07)                   Impression:      No acute fracture.      Electronically signed by: Lisbeth Soria  Date:    01/23/2024  Time:    02:05               Narrative:    EXAMINATION:  XR LUMBAR SPINE AP AND LATERAL    CLINICAL HISTORY:  lumbar back pain;    TECHNIQUE:  AP, lateral and spot images were performed of the lumbar spine.    COMPARISON:  None    FINDINGS:  No acute fracture.  Minimal anterolisthesis at L4-5.  Moderate to advanced diffuse multilevel discogenic disease.  Advanced lower lumbar facet arthrosis.                                        CT Chest Without Contrast (Final result)  Result time 01/22/24 20:31:26      Final result by Ayush Moss MD (01/22/24 20:31:26)                   Impression:      Innumerable pulmonary  nodules primarily concerning for metastatic disease.  Evaluation of number and size is limited by motion.    Small bilateral pleural effusions.    Trace pericardial effusion.    Complete findings as above.    This report was flagged in Epic as abnormal.    All CT scans at this facility are performed  using dose modulation techniques as appropriate to performed exam including the following:  automated exposure control; adjustment of mA and/or kV according to the patients size (this includes techniques or standardized protocols for targeted exams where dose is matched to indication/reason for exam: i.e. extremities or head);  iterative reconstruction technique.      Electronically signed by: Ayush Moss  Date:    01/22/2024  Time:    20:31               Narrative:    EXAMINATION:  CT CHEST WITHOUT CONTRAST    CLINICAL HISTORY:  Lymphadenopathy, chest or axilla;    TECHNIQUE:  Low dose axial images, sagittal and coronal reformations were obtained from the thoracic inlet to the lung bases. Contrast was not administered.    COMPARISON:  None    FINDINGS:  Base of Neck: No significant abnormality.    Thoracic soft tissues: Normal.    Aorta: Left-sided aortic arch.  No aneurysm and no significant atherosclerosis    Heart: Trace pericardial effusion.  Normal size.    Pulmonary vasculature: Pulmonary arteries distribute normally.  There are four pulmonary veins.    Rylee/Mediastinum: No pathologic mauri enlargement.    Airways: Patent.    Lungs/Pleura: Small bilateral pleural effusions.  Innumerable small nodular opacities throughout the lungs.  Motion severely degrades fine parenchymal evaluation and accurate determination of number and size of nodules.  These would be presumed neoplastic and metastatic until proven otherwise.    Esophagus: Normal.    Bones: No acute fracture. No suspicious lytic or sclerotic lesions.  Osseous degenerative changes.                                        CT Abdomen Pelvis  Without Contrast  (Final result)  Result time 01/22/24 18:34:22      Final result by Ayush Moss MD (01/22/24 18:34:22)                   Impression:      Innumerable hepatic lesions consistent with metastatic disease.  Additional retroperitoneal adenopathy.    At least small bilateral pleural effusions. Increased interstitial attenuation. Innumerable nodular pulmonary opacities in the lung bases favored related to neoplasm.    Small volume abdominopelvic ascites.    Complete findings as above.    This report was flagged in Epic as abnormal.    All CT scans at this facility are performed  using dose modulation techniques as appropriate to performed exam including the following:  automated exposure control; adjustment of mA and/or kV according to the patients size (this includes techniques or standardized protocols for targeted exams where dose is matched to indication/reason for exam: i.e. extremities or head);  iterative reconstruction technique.      Electronically signed by: Ayush Moss  Date:    01/22/2024  Time:    18:34               Narrative:    EXAMINATION:  CT ABDOMEN PELVIS WITHOUT CONTRAST    CLINICAL HISTORY:  Abdominal pain, acute, nonlocalized;    TECHNIQUE:  Low dose axial images, sagittal and coronal reformations were obtained from the lung bases to the pubic symphysis.  Contrast was not administered.    COMPARISON:  None    FINDINGS:  Heart: Normal in size. No pericardial effusion.    Lung Bases: At least small bilateral pleural effusions.  Increased interstitial attenuation.  Innumerable nodular pulmonary opacities in the lung bases favored related to neoplasm    Liver: Multiple hepatic hypodensities most concerning for neoplasm.    Gallbladder: Irregularity along the gallbladder lumen possibly stones.    Bile Ducts: No evidence of dilated ducts.    Pancreas: No mass or peripancreatic fat stranding.    Spleen: Unremarkable.    Adrenals: Unremarkable.    Kidneys/ Ureters: Multiple renal hypodensities not  all fully characterized but favoring simple cysts.  No hydronephrosis.  Nonobstructive left nephrolithiasis.    Bladder: No evidence of wall thickening.    Reproductive organs: Uterus is surgically absent.    GI Tract/Mesentery: Diverticulosis without diverticulitis.  No evidence of appendicitis.    Peritoneal Space: Small volume abdominopelvic ascites.  No free air.    Retroperitoneum: Retroperitoneal adenopathy.    Abdominal wall: Unremarkable.    Vasculature: No significant atherosclerosis or aneurysm.    Bones: No acute fracture.  Osseous degenerative changes.                                       Pending Diagnostic Studies:       Procedure Component Value Units Date/Time    CT Biopsy Liver [[id number]] Resulted: 01/25/24 1510    Order Status: Sent Lab Status: No result Updated: 01/25/24 1716    Fecal fat, qualitative [[id number]] Collected: 01/27/24 0931    Order Status: Sent Lab Status: In process Updated: 01/27/24 0932    Specimen: Stool     Giardia / Cryptosporidum, EIA [[id number]] Collected: 01/27/24 0931    Order Status: Sent Lab Status: In process Updated: 01/27/24 0932    Specimen: Stool     Giardia / Cryptosporidum, EIA [[id number]] Collected: 01/27/24 0931    Order Status: Sent Lab Status: In process Updated: 01/27/24 0931    Specimen: Stool     Rotavirus antigen, stool [[id number]] Collected: 01/27/24 0931    Order Status: Sent Lab Status: In process Updated: 01/27/24 0932    Specimen: Stool     Rotavirus antigen, stool [[id number]] Collected: 01/27/24 0931    Order Status: Sent Lab Status: In process Updated: 01/27/24 0931    Specimen: Stool     Specimen to Pathology, Radiology Liver biopsy [[id number]] Collected: 01/25/24 1535    Order Status: Sent Lab Status: In process Updated: 01/26/24 1415    Stool Exam-Ova,Cysts,Parasites [[id number]] Collected: 01/27/24 0931    Order Status: Sent Lab Status: In process Updated: 01/27/24 1345    Specimen: Stool     Stool Exam-Ova,Cysts,Parasites [[id number]]  Collected: 01/27/24 0931    Order Status: Sent Lab Status: In process Updated: 01/27/24 1345    Specimen: Stool     WBC, Stool [[id number]] Collected: 01/27/24 0931    Order Status: Sent Lab Status: In process Updated: 01/27/24 0932    Specimen: Stool     WBC, Stool [[id number]] Collected: 01/27/24 0931    Order Status: Sent Lab Status: In process Updated: 01/27/24 0932    Specimen: Stool            Medications:       Medication List        START taking these medications      azithromycin 500 MG tablet  Commonly known as: ZITHROMAX  Take 1 tablet (500 mg total) by mouth once daily. for 7 days     multivitamin Tab  Take 1 tablet by mouth once daily.  Start taking on: January 29, 2024            CONTINUE taking these medications      amLODIPine 5 MG tablet  Commonly known as: NORVASC  TAKE 1 TABLET ONE TIME DAILY     aspirin 81 MG Chew     atorvastatin 40 MG tablet  Commonly known as: LIPITOR  TAKE 1 TABLET EVERY DAY     CALTRATE PLUS ORAL     carvediloL 25 MG tablet  Commonly known as: COREG  TAKE 1 TABLET TWICE DAILY WITH MEALS     CENTRUM SILVER ORAL     cyclobenzaprine 10 MG tablet  Commonly known as: FLEXERIL     diphenhydrAMINE 50 MG capsule  Commonly known as: BENADRYL  Take one tablet prior to scan     fluconazole 150 MG Tab  Commonly known as: DIFLUCAN  One tab now and one tab in one week     fluticasone propionate 50 mcg/actuation nasal spray  Commonly known as: FLONASE  USE 2 SPRAYS IN EACH NOSTRIL AT BEDTIME (SUBSTITUTED FOR  FLONASE)     hydrALAZINE 10 MG tablet  Commonly known as: APRESOLINE  TAKE 1 TABLET THREE TIMES DAILY     levothyroxine 88 MCG tablet  Commonly known as: SYNTHROID  Take 1 tablet (88 mcg total) by mouth once daily.     linaGLIPtin 5 mg Tab tablet  Commonly known as: TRADJENTA  TAKE 1 TABLET(5 MG) BY MOUTH EVERY DAY     losartan 100 MG tablet  Commonly known as: COZAAR  TAKE 1 TABLET ONE TIME DAILY     meclizine 25 mg tablet  Commonly known as: ANTIVERT  TAKE 1 TABLET THREE TIMES DAILY  AS NEEDED     montelukast 10 mg tablet  Commonly known as: SINGULAIR  Take 1 tablet (10 mg total) by mouth every evening.     mupirocin 2 % ointment  Commonly known as: BACTROBAN  Apply topically 3 (three) times daily.     nitroGLYCERIN 0.3 MG SL tablet  Commonly known as: NITROSTAT     senna-docusate 8.6-50 mg 8.6-50 mg per tablet  Commonly known as: PERICOLACE     VITAMIN B-12 ORAL     vitamin D 1000 units Tab  Commonly known as: VITAMIN D3     ZYRTEC ORAL            STOP taking these medications      calcium carbonate 600 mg calcium (1,500 mg) Tab  Commonly known as: OS-LYNNETTE     predniSONE 50 MG Tab  Commonly known as: DELTASONE               Where to Get Your Medications        These medications were sent to [id number] - [place] [address] [facility] [address] [facility] [address] [facility] [place] [id number] [facility] [address]      Phone: [phone]   azithromycin 500 MG tablet  multivitamin Tab          Indwelling Lines/Drains at time of discharge:   Lines/Drains/Airways       None                   Time spent on the discharge of patient: 94 minutes         Kyleigh Moore MD  Department of Hospital Medicine  [facility] - Telemetry ([facility])

## 2024-01-28 NOTE — DISCHARGE INSTRUCTIONS
Recommend compliance with medications, antibiotic to complete course, follow-up visits with PCP, Hematology Oncology, infectious disease within 1 week upon discharge

## 2024-01-28 NOTE — PLAN OF CARE
OT defers to PT for pt's acute care since she is at prior level off function for ADL.  No follow up OT indicated.

## 2024-01-28 NOTE — SUBJECTIVE & OBJECTIVE
Interval History:   78 year old woman with leucocytosis and multiple liver and lung lesions on imaging .  Family are in the room at this time.  Gall bladder ultrasound-  1/26/23  Multiple known liver masses suspicious for metastatic disease.  The gallbladder is abnormal in appearance with multiple gallstones as well as a 2.4 cm hypoechoic finding within it suspicious for mass.  Additionally there is some irregular gallbladder wall thickening.  Gallbladder is abnormal with gallstones as well as masslike findings which could represent a primary gallbladder malignancy.  Mild ascites.     1/27/24- she is sleeping .  CBC showed wbc -21  Review of Systems   Constitutional:  Positive for activity change and appetite change. Negative for chills, diaphoresis, fatigue and fever.   Respiratory:  Negative for apnea, choking and chest tightness.    Cardiovascular:  Negative for chest pain.     Objective:     Vital Signs (Most Recent):  Temp: 98.4 °F (36.9 °C) (01/28/24 0507)  Pulse: 94 (01/28/24 0507)  Resp: 20 (01/28/24 0542)  BP: (!) 149/66 (01/28/24 0507)  SpO2: (!) 93 % (01/28/24 0507) Vital Signs (24h Range):  Temp:  [98.4 °F (36.9 °C)-99 °F (37.2 °C)] 98.4 °F (36.9 °C)  Pulse:  [84-97] 94  Resp:  [16-32] 20  SpO2:  [92 %-95 %] 93 %  BP: (124-150)/(59-74) 149/66     Weight: 73.5 kg (162 lb 0.6 oz)  Body mass index is 30.62 kg/m².    Estimated Creatinine Clearance: 43.2 mL/min (based on SCr of 1 mg/dL).     Physical Exam  Vitals and nursing note reviewed.   HENT:      Head: Normocephalic.   Eyes:      Pupils: Pupils are equal, round, and reactive to light.   Cardiovascular:      Rate and Rhythm: Normal rate.   Pulmonary:      Effort: Pulmonary effort is normal.   Abdominal:      General: Abdomen is flat.   Musculoskeletal:      Cervical back: Normal range of motion.   Neurological:      General: No focal deficit present.      Mental Status: She is alert and oriented to person, place, and time.          Significant Labs:  Blood Culture:   Recent Labs   Lab 01/22/24  1651 01/22/24  1652   LABBLOO No growth after 5 days. No growth after 5 days.       BMP:   Recent Labs   Lab 01/28/24  0529   *   *   K 3.8   *   CO2 19*   BUN 17   CREATININE 1.0   CALCIUM 8.0*     CBC:   Recent Labs   Lab 01/26/24  0758 01/27/24  0811 01/28/24  0529   WBC 18.29* 21.29* 25.66*   HGB 8.8* 9.1* 9.9*   HCT 29.1* 30.1* 31.8*   * 489* 570*       CMP:   Recent Labs   Lab 01/27/24  0811 01/28/24  0529    147*   K 3.7 3.8   * 117*   CO2 20* 19*   * 112*   BUN 19 17   CREATININE 1.2 1.0   CALCIUM 7.8* 8.0*   ANIONGAP 9 11     All pertinent labs within the past 24 hours have been reviewed.    Significant Imaging: I have reviewed all pertinent imaging results/findings within the past 24 hours.

## 2024-01-28 NOTE — PT/OT/SLP EVAL
"Physical Therapy Evaluation    Patient Name:  Tamy Allan   MRN:  [id number]    Recommendations:     Discharge Recommendations: Low Intensity Therapy   Discharge Equipment Recommendations: none   Barriers to discharge: None    Assessment:     Tamy Allan is a 78 y.o. female admitted with a medical diagnosis of Abdominal pain.  She presents with the following impairments/functional limitations: weakness, impaired balance, decreased safety awareness, impaired endurance, pain, impaired self care skills, decreased coordination, decreased ROM, impaired functional mobility, decreased upper extremity function, impaired coordination, gait instability, decreased lower extremity function, impaired fine motor.    Rehab Prognosis: Good; patient would benefit from acute skilled PT services to address these deficits and reach maximum level of function.    Recent Surgery: * No surgery found *      Plan:     During this hospitalization, patient to be seen 3 x/week to address the identified rehab impairments via therapeutic exercises, therapeutic activities, gait training and progress toward the following goals:    Plan of Care Expires:  02/11/24    Subjective     Chief Complaint: "I don't know why but I feel like I'm confused."  Patient/Family Comments/goals: Get better and return home.   Pain/Comfort:  Pain Rating 1: 7/10  Location - Side 1: Bilateral  Location - Orientation 1: lower  Location 1: back  Pain Addressed 1: Cessation of Activity, Distraction, Reposition  Pain Rating Post-Intervention 1: 7/10    Patients cultural, spiritual, Moravian conflicts given the current situation: no    Living Environment:    Patient lives with her spouse in a single level home with no steps to enter.   Prior to admission, patients level of function was ambulating with a RW PRN, Mod I with ADLs.  Equipment used at home: walker, rolling, grab bar, cane, straight.  DME owned (not currently used): single point cane.  Upon discharge, " patient will have assistance from unknown.    Objective:     Communicated with SUDEEP Grace prior to session.  Patient found supine with peripheral IV, telemetry, oxygen  upon PT entry to room.    General Precautions: Standard, fall  Orthopedic Precautions:N/A   Braces: N/A  Respiratory Status: Nasal cannula, flow 2 L/min    Exams:  Cognitive Exam:  Patient is oriented to Person, Place, and Time  RLE ROM: WFL  RLE Strength: WFL  LLE ROM: WFL  LLE Strength: WFL    Functional Mobility:  Bed Mobility:     Supine to Sit: stand by assistance  Transfers:     Sit to Stand:  stand by assistance with rolling walker  Bed to Chair: stand by assistance with  rolling walker  using  Step Transfer  Gait: 350 feet, stand by assistance with rolling walker       AM-PAC 6 CLICK MOBILITY  Total Score:21       Treatment & Education:    Patient found supine in bed upon PT entrance into room. PT provided education on role of PT and POC.     Patient completed:    Sup>sit: SBA with use of bed rail    STS: SBA with RW and cues for hand placement for safety with transfer    Gait: 350 feet, SBA with O2 in tow. Patient ambulated with slow bell and decreased stride length but with no LOB.     Chair tx: SBA with RW, cues to reach back for arm rest of chair while lowering body into chair.     Patient left up in chair with all lines intact and call button in reach.    GOALS:   Multidisciplinary Problems       Physical Therapy Goals          Problem: Physical Therapy    Goal Priority Disciplines Outcome Goal Variances Interventions   Physical Therapy Goal     PT, PT/OT      Description: Patient will be seen a minimal of 3 out of 7 days a week.     LTG to be met by 02/11/24:    Bed mobility: Mod I  Transfers: Mod I with RW  Gait: Mod I with RW x400 feet                       History:     Past Medical History:   Diagnosis Date    Acquired hypothyroidism     Amblyopia     OD    Arthritis     Benign hypertension     Cardiomegaly     Cataract      Diabetes mellitus, type 2     Encounter for blood transfusion     Encounter for general adult medical examination without abnormal findings 08/31/2016    Hyperlipidemia     Hyperlipidemia     Hypertension     Refractive error     Type 2 diabetes mellitus     Vitamin D deficiency        Past Surgical History:   Procedure Laterality Date    CARDIAC CATHETERIZATION      with stents    HYSTERECTOMY      TOTAL KNEE ARTHROPLASTY Left        Time Tracking:     PT Received On: 01/28/24  PT Start Time: 0800     PT Stop Time: 0824  PT Total Time (min): 24 min     Billable Minutes: Evaluation 12 and Gait Training 12      01/28/2024

## 2024-01-28 NOTE — PLAN OF CARE
[facility] - Telemetry (Hospital)  Discharge Final Note    Primary Care Provider: Irma Ro MD    Expected Discharge Date: 1/28/2024    Final Discharge Note (most recent)       Final Note - 01/28/24 1648          Final Note    Assessment Type Final Discharge Note     Anticipated Discharge Disposition Home-Health Care Svc        Post-Acute Status    HME Status Set-up Complete/Auth obtained     Discharge Delays None known at this time                     Important Message from Medicare             Contact Info       Irma Ro MD   Specialty: Internal Medicine   Relationship: PCP - General    [address] [place] [id number]   Phone: [phone]       Next Steps: Follow up in 1 week(s)    Irma Ro MD   Specialty: Internal Medicine   Relationship: PCP - General    [address] [place] [id number]   Phone: [phone]       Next Steps: Follow up in 1 week(s)    Russel Rodriguez MD   Specialty: Hematology and Oncology    [address]  [place] [address]   Phone: [phone]       Next Steps: Follow up in 1 week(s)    Pilo Xie MD, [facility]   Specialty: Infectious Diseases, Hospitalist    [address] [place] [address]   Phone: [phone]       Next Steps: Follow up in 1 week(s)    [facility]   Specialty: Home Health Services    [address], [address]  [facility] [address]   Phone: [phone]       Next Steps: Follow up          Discharge home with [facility] and home oxygen by [facility].

## 2024-01-28 NOTE — PLAN OF CARE
01/28/24 1648   Final Note   Assessment Type Final Discharge Note   Anticipated Discharge Disposition Home-Health   Post-Acute Status   HME Status Set-up Complete/Auth obtained   Discharge Delays None known at this time

## 2024-01-28 NOTE — PLAN OF CARE
Discussed POC with pt erbalized understanding.  Patient remains free from injury.  Safety and fall  precautions maintained.   Call light and personal belongings within reach, bed in lowest position with bed wheels locked.   No s/s of acute distress.  Purposeful rounding continued this shift.  Diet orders continued, pt diet: cardiac   Vital signs continued per orders this shift.   Patient mobility status x1 assist   Chart and orders review completed. Pt education about care completed.     Quality 431: Preventive Care And Screening: Unhealthy Alcohol Use - Screening: Patient screened for unhealthy alcohol use using a single question and scores less than 2 times per year Quality 226: Preventive Care And Screening: Tobacco Use: Screening And Cessation Intervention: Patient screened for tobacco use and is an ex/non-smoker Quality 431: Preventive Care And Screening: Unhealthy Alcohol Use - Screening: Patient not identified as an unhealthy alcohol user when screened for unhealthy alcohol use using a systematic screening method Quality 130: Documentation Of Current Medications In The Medical Record: Current Medications Documented Detail Level: Detailed

## 2024-01-28 NOTE — PT/OT/SLP EVAL
Occupational Therapy   Evaluation and Discharge Note    Name: Tamy Allan  MRN: [id number]  Admitting Diagnosis: Abdominal pain  Recent Surgery: * No surgery found *      Recommendations:     Discharge Recommendations: No Therapy Indicated  Discharge Equipment Recommendations: none  Barriers to discharge:  None    Assessment:     Tamy Allan is a 78 y.o. female with a medical diagnosis of Abdominal pain. At this time, patient is functioning at their prior level of function and does not require further acute OT services.     Plan:     During this hospitalization, patient does not require further acute OT services.  Please re-consult if situation changes.    Plan of Care Reviewed with: patient    Subjective     Chief Complaint: Pt with severe abdominal pain.  Patient/Family Comments/goals: DC home at prior level of function.    Occupational Profile:  Living Environment: lives with   Previous level of function: uses RW for mobility and ADL  Roles and Routines: , retired  Equipment Used at home: none  Assistance upon Discharge: family    Pain/Comfort:  Pain Rating 1: 0/10  Pain Rating Post-Intervention 1: 0/10    Patients cultural, spiritual, Protestant conflicts given the current situation: no    Objective:     Communicated with: nurse prior to session.  Patient found left sidelying with peripheral IV, oxygen upon OT entry to room.    General Precautions: Standard, fall  Orthopedic Precautions: N/A  Braces: N/A  Respiratory Status: Nasal cannula, flow 2 L/min     Occupational Performance:    Bed Mobility:    Patient completed Rolling/Turning to Left with  supervision  Patient completed Supine to Sit with supervision    Functional Mobility/Transfers:  Patient completed Sit <> Stand Transfer with supervision  with  rolling walker   Patient completed Toilet Transfer Step Transfer technique with supervision with  rolling walker    Activities of Daily Living:  Grooming: supervision    Lower Body Dressing:  supervision    Toileting: supervision      Cognitive/Visual Perceptual:  Cognitive/Psychosocial Skills:     -       Oriented to: Person, Place, Time, and Situation   -       Follows Commands/attention:Follows multistep  commands  -       Safety awareness/insight to disability: intact   -       Mood/Affect/Coping skills/emotional control: Appropriate to situation  Visual/Perceptual:      -Intact      Physical Exam:  Balance:    -       good sitting balance, good standing balance with RW.  Upper Extremity Range of Motion:     -       Right Upper Extremity: WNL  -       Left Upper Extremity: WNL  Upper Extremity Strength:    -       Right Upper Extremity: WNL  -       Left Upper Extremity: WNL  Neurological:    -       no deficits noted    AMPA 6 Click ADL:  AMPA Total Score: 24    Treatment & Education:  Role of OT  Use of call light before getting out of bed.    Patient left up in chair with all lines intact and call button in reach    GOALS:   Multidisciplinary Problems       Occupational Therapy Goals          Problem: Occupational Therapy    Goal Priority Disciplines Outcome Interventions   Occupational Therapy Goal     OT, PT/OT     Description: OT defers to PT for pt's acute care since she is at prior level off function for ADL.  No follow up OT indicated.                       History:     Past Medical History:   Diagnosis Date    Acquired hypothyroidism     Amblyopia     OD    Arthritis     Benign hypertension     Cardiomegaly     Cataract     Diabetes mellitus, type 2     Encounter for blood transfusion     Encounter for general adult medical examination without abnormal findings 08/31/2016    Hyperlipidemia     Hyperlipidemia     Hypertension     Refractive error     Type 2 diabetes mellitus     Vitamin D deficiency          Past Surgical History:   Procedure Laterality Date    CARDIAC CATHETERIZATION      with stents    HYSTERECTOMY      TOTAL KNEE ARTHROPLASTY Left        Time Tracking:     OT Date of  Treatment: 01/28/24  OT Start Time: 0845  OT Stop Time: 0905  OT Total Time (min): 20 min    Billable Minutes:Evaluation 10  Therapeutic Activity 10    1/28/2024

## 2024-01-28 NOTE — PLAN OF CARE
"  Problem: Adult Inpatient Plan of Care  Goal: Patient-Specific Goal (Individualized)  Outcome: Ongoing, Progressing     Pt AAO x4  No tele monitor   BP (!) 149/67   Pulse 85   Temp 98 °F (36.7 °C)   Resp 18   Ht 5' 1" (1.549 m)   Wt 73.5 kg (162 lb 0.6 oz)   SpO2 95%   BMI 30.62 kg/m²    Afebrile   On 2.lpm via nc   Qualifies for home 02   CT of chest today   Up to chair with meals   IV abx changed pt ID to adin   LR at 50  PT/OT   Fall precautions in place     "

## 2024-01-28 NOTE — PROGRESS NOTES
[facility]  Infectious Disease  Progress Note    Patient Name: Tamy Allan  MRN: [id number]  Admission Date: 1/22/2024  Length of Stay: 6 days  Attending Physician: Kyleigh Moore,*  Primary Care Provider: Irma Ro MD    Isolation Status: No active isolations  Assessment/Plan:      ID  SIRS (systemic inflammatory response syndrome)  Was started on empiric Zosyn /Zyvox- UA is normal  Chest CT scan -Lungs/Pleura: Small bilateral pleural effusions.  Innumerable small nodular opacities throughout the lungs.  Motion severely degrades fine parenchymal evaluation and accurate determination of number and size of nodules.  These would be presumed neoplastic and metastatic until proven otherwise.    Will plan to deescalate regime -stop Zyvox- no MRSA noted  Leucocytosis could be stress response -follow biopsy results     1/26- will switch to Rocephin , monitor wbc in AM    1/27- wbc is now 21- will switch to Zosyn if this continues to increase , check stool assay and serum procal in AM    Oncology  Metastatic disease  Follow IR for biopsy,oncology follow up  1/26- s/p IR guided biopsy - follow oncology    Endocrine  Diabetes mellitus  Insulin regime as per primary team        Anticipated Disposition:     Thank you for your consult. I will follow-up with patient. Please contact us if you have any additional questions.    Pilo Xie MD, [facility]  Infectious Disease  [facility])    Subjective:     Principal Problem:Abdominal pain    HPI: 78 year old woman with  past medical history of Acquired hypothyroidism, Amblyopia, Arthritis, Benign hypertension, Cardiomegaly, Cataract, Diabetes mellitus, who was admitted with abdominal pain.  She appears weak  Labs and imaging test -    Component      Latest Ref Rng 1/22/2024 1/23/2024 1/24/2024 1/25/2024   WBC      3.90 - 12.70 K/uL 21.73 (H)  19.46 (H)  19.00 (H)  18.87 (H)    -serum procal -0.52  CT chest/abdomen/pelvis  revealing multiple pulmonary nodules as well as multiple hepatic lesions concerning for underlying malignancy/neoplasm.     Interval History:   78 year old woman with leucocytosis and multiple liver and lung lesions on imaging .  Family are in the room at this time.  Gall bladder ultrasound-  1/26/23  Multiple known liver masses suspicious for metastatic disease.  The gallbladder is abnormal in appearance with multiple gallstones as well as a 2.4 cm hypoechoic finding within it suspicious for mass.  Additionally there is some irregular gallbladder wall thickening.  Gallbladder is abnormal with gallstones as well as masslike findings which could represent a primary gallbladder malignancy.  Mild ascites.     1/27/24- she is sleeping .  CBC showed wbc -21  Review of Systems   Constitutional:  Positive for activity change and appetite change. Negative for chills, diaphoresis, fatigue and fever.   Respiratory:  Negative for apnea, choking and chest tightness.    Cardiovascular:  Negative for chest pain.     Objective:     Vital Signs (Most Recent):  Temp: 98.4 °F (36.9 °C) (01/28/24 0507)  Pulse: 94 (01/28/24 0507)  Resp: 20 (01/28/24 0542)  BP: (!) 149/66 (01/28/24 0507)  SpO2: (!) 93 % (01/28/24 0507) Vital Signs (24h Range):  Temp:  [98.4 °F (36.9 °C)-99 °F (37.2 °C)] 98.4 °F (36.9 °C)  Pulse:  [84-97] 94  Resp:  [16-32] 20  SpO2:  [92 %-95 %] 93 %  BP: (124-150)/(59-74) 149/66     Weight: 73.5 kg (162 lb 0.6 oz)  Body mass index is 30.62 kg/m².    Estimated Creatinine Clearance: 43.2 mL/min (based on SCr of 1 mg/dL).     Physical Exam  Vitals and nursing note reviewed.   HENT:      Head: Normocephalic.   Eyes:      Pupils: Pupils are equal, round, and reactive to light.   Cardiovascular:      Rate and Rhythm: Normal rate.   Pulmonary:      Effort: Pulmonary effort is normal.   Abdominal:      General: Abdomen is flat.   Musculoskeletal:      Cervical back: Normal range of motion.   Neurological:      General: No  focal deficit present.      Mental Status: She is alert and oriented to person, place, and time.          Significant Labs: Blood Culture:   Recent Labs   Lab 01/22/24  1651 01/22/24  1652   LABBLOO No growth after 5 days. No growth after 5 days.       BMP:   Recent Labs   Lab 01/28/24  0529   *   *   K 3.8   *   CO2 19*   BUN 17   CREATININE 1.0   CALCIUM 8.0*     CBC:   Recent Labs   Lab 01/26/24  0758 01/27/24  0811 01/28/24  0529   WBC 18.29* 21.29* 25.66*   HGB 8.8* 9.1* 9.9*   HCT 29.1* 30.1* 31.8*   * 489* 570*       CMP:   Recent Labs   Lab 01/27/24  0811 01/28/24  0529    147*   K 3.7 3.8   * 117*   CO2 20* 19*   * 112*   BUN 19 17   CREATININE 1.2 1.0   CALCIUM 7.8* 8.0*   ANIONGAP 9 11     All pertinent labs within the past 24 hours have been reviewed.    Significant Imaging: I have reviewed all pertinent imaging results/findings within the past 24 hours.

## 2024-01-29 NOTE — PROGRESS NOTES
"C3 nurse spoke with Tamy Allan's son Sourav Chamberlain "OJ"  for a TCC post hospital discharge follow up call. Pts. son Sourav Allan Jr. stated she is unable to get out to go to her PCP due to her back pain.  C3 nurse sched. HOSFU visit per Home NP in [facility] per his consent. Home NP visit with Carol Soriano on 02/02/24 @ 0800. He stated understanding of HOSPFU.     "

## 2024-01-29 NOTE — TELEPHONE ENCOUNTER
Pt was discharged from hospital yesterday. Having continued issues with back pain today. They are waiting biopsy results on lung and liver nodules. Pt having back pain keeping her from wanting to do anything but lay down and rest, she is able to walk. Pt requesting pain medication for back pain. Pt rates pain 10/10, but pts son reports it is no worse than when she was discharged from hospital yesterday.     Dispo- discuss with PCP and callback by nurse within 1 hour. High priority messaged routed to providers office requesting call back to pt and her son. Pts son advised, callback number confirmed, care advice given, he verbalizes understanding.  Reason for Disposition   Caller has URGENT question and triager unable to answer question    Additional Information   Negative: Sounds like a life-threatening emergency to the triager   Negative: Patient sounds very sick or weak to the triager   Negative: Sounds like a serious complication to the triager   Negative: Condition / symptoms WORSE    Protocols used: Post-Hospitalization Follow-up Call-A-[place]

## 2024-01-31 PROBLEM — I42.0 DILATED CARDIOMYOPATHY: Status: RESOLVED | Noted: 2023-01-01 | Resolved: 2024-01-01

## 2024-01-31 NOTE — NURSING
1104 am: Outgoing call to pt regarding WQ referral received per Dr. Moore for Liver mets. Pt didn't answer. LVM. Verifying that pt is seeking Oncology care at [facility] with Dr. Rutledge and not here at [facility]. Dr. Ro placed referral today for Dr. Rutledge at [facility].   Oncology Navigation   Intake  Date of Diagnosis: 01/25/24  Cancer Type: GI (liver adenocarcinoma)  Internal / External Referral: Internal (Dr. Moore)  Date of Referral: 01/29/24  Initial Nurse Navigator Contact: 01/31/24  Referral to Initial Contact Timeline (days): 2  Date Worked: 01/31/24  Multiple appointments: No     Treatment                              Acuity      Follow Up  No follow-ups on file.

## 2024-02-03 NOTE — PROGRESS NOTES
[facility]  Transitional Care Management (TCM) Home Visit    Encounter Provider: Carol Grace   PCP: Irma Ro MD  Consult Requested By: Dr. Kyleigh Arciniega*  Admit Date: 1/22/24   IP Discharge Date: 1/28/24  Hospital Length of Stay:RRHLOS@ days  Days since discharge (from IP or SNF): 5 days   [facility] On Call Contact Note: N/A  Hospital Diagnosis: Metastatic disease [C79.9];Chronic obstructive pulmonary disease, unspecified COPD type [J44.9]     HISTORY OF PRESENT ILLNESS      Patient ID: Tamy Allan is a 78 y.o. female was recently admitted to the hospital, this is their TCM encounter.    Hospital Course Synopsis:    1) 78 y.o. female patient with a PMHx of DM II, HLD, amblyopia, a cataract, HTN, arthritis, an encounter for a blood transfusion, hyperthyroidism, cardiomegaly, and a vitamin D deficiency who presents to the Emergency Department for evaluation of LLQ abd pain which radiates to her lower back and onset gradually 3 weeks PTA. Symptoms are constant, and pt rates the pain a 10/10. No mitigating or exacerbating factors reported. Associated sxs include diarrhea, vomiting, and dysuria. Patient denies any fever, nausea, and all other sxs at this time. Pt reports she was recently dx with a UTI but did not finish her medicine. Pt reports she takes all her other medications regularly. Pt's son reports pt has been confused and has not been moving around for the past week.   2) Developments since hospitalization and current needs: none at present  3) Please confirm dates of admit, discharge, LOS above are correct: verified with chart and son.    DECISION MAKING TODAY       Assessment & Plan:  1. Metastatic disease  -     Ambulatory referral/consult to [facility] - TCC    2. Chronic obstructive pulmonary disease, unspecified COPD type  -     Ambulatory referral/consult to [facility] - TCC         Medication List on Discharge:     Medication List            Accurate as  of February 2, 2024 11:59 PM. If you have any questions, ask your nurse or doctor.                CONTINUE taking these medications      amLODIPine 5 MG tablet  Commonly known as: NORVASC  TAKE 1 TABLET ONE TIME DAILY     aspirin 81 MG Chew  Take 81 mg by mouth once daily.     atorvastatin 40 MG tablet  Commonly known as: LIPITOR  TAKE 1 TABLET EVERY DAY     azithromycin 500 MG tablet  Commonly known as: ZITHROMAX  Take 1 tablet (500 mg total) by mouth once daily. for 7 days     CALTRATE PLUS ORAL  Take by mouth.     carvediloL 25 MG tablet  Commonly known as: COREG  TAKE 1 TABLET TWICE DAILY WITH MEALS     CENTRUM SILVER ORAL  Take by mouth.     diphenhydrAMINE 50 MG capsule  Commonly known as: BENADRYL  Take one tablet prior to scan     fluconazole 150 MG Tab  Commonly known as: DIFLUCAN  One tab now and one tab in one week     fluticasone propionate 50 mcg/actuation nasal spray  Commonly known as: FLONASE  USE 2 SPRAYS IN EACH NOSTRIL AT BEDTIME (SUBSTITUTED FOR  FLONASE)     hydrALAZINE 10 MG tablet  Commonly known as: APRESOLINE  TAKE 1 TABLET THREE TIMES DAILY     HYDROcodone-acetaminophen 7.5-325 mg per tablet  Commonly known as: NORCO  Take 1 tablet by mouth every 6 (six) hours as needed for Pain.     levothyroxine 88 MCG tablet  Commonly known as: SYNTHROID  Take 1 tablet (88 mcg total) by mouth once daily.     linaGLIPtin 5 mg Tab tablet  Commonly known as: TRADJENTA  TAKE 1 TABLET(5 MG) BY MOUTH EVERY DAY     losartan 100 MG tablet  Commonly known as: COZAAR  TAKE 1 TABLET ONE TIME DAILY     meclizine 25 mg tablet  Commonly known as: ANTIVERT  TAKE 1 TABLET THREE TIMES DAILY AS NEEDED     montelukast 10 mg tablet  Commonly known as: SINGULAIR  Take 1 tablet (10 mg total) by mouth every evening.     multivitamin Tab  Take 1 tablet by mouth once daily.     mupirocin 2 % ointment  Commonly known as: BACTROBAN  Apply topically 3 (three) times daily.     nitroGLYCERIN 0.3 MG SL tablet  Commonly known as:  NITROSTAT     senna-docusate 8.6-50 mg 8.6-50 mg per tablet  Commonly known as: PERICOLACE  Take 2 tablets by mouth daily as needed.     VITAMIN B-12 ORAL  Take by mouth once daily.     vitamin D 1000 units Tab  Commonly known as: VITAMIN D3  Take 2,000 Units by mouth.     ZYRTEC ORAL  Take by mouth.              Medication Reconciliation:  Were medications changed on discharge? No  Were medications in the home? Yes  Is the patient taking the medications as directed? Yes  Does the patient understand the medications and changes? Yes  Does updated med list accurately reflects meds patient is currently taking? Yes    ENVIRONMENT OF CARE      Family and/or Caregiver present at visit?  Yes  Name of Caregiver: Patient's son  History provided by: caregiver    Advance Care Planning   Advanced Care Planning Status:  Patient has had an ACP conversation  Living Will: No  Power of : No  [place]: No    Does Caregiver have HCPoA: No  Changes today: None  Is patient hospice appropriate: No  (If needed, use PPS <30 or FAST score >7)  Was referral to hospice placed: No       Impression upon entering the home:  Physical Dwelling: single family home   Appearance of home environment: cleaniness: clean, walking pathways: clear, lighting: adequate, and home structure: sound structure  Functional Status: minimal assistance  Mobility: ambulatory with device  Nutritional access: available food but inadequate intake   Home Health: Yes, HH Agency [facility]    DME/Supplies: rolling walker     Diagnostic tests reviewed/disposition: No diagnosic tests pending after this hospitalization.  Disease/illness education: Cancer  Establishment or re-establishment of referral orders for community resources: No other necessary community resources.   Discussion with other health care providers: No discussion with other health care providers necessary.   Does patient have a PCP at [facility]? No   Repatriation plan with PCP? follow-up with PCP  within 30d   Does patient have an ostomy (ileostomy, colostomy, suprapubic catheter, nephrostomy tube, tracheostomy, PEG tube, pleurex catheter, cholecystostomy, etc)? No  Were BPAs reviewed? Yes    Social History     Socioeconomic History    Marital status:    Tobacco Use    Smoking status: Never    Smokeless tobacco: Never   Substance and Sexual Activity    Alcohol use: No    Drug use: No    Sexual activity: Not Currently       OBJECTIVE:     Vital Signs:  Vitals:    02/02/24 1015   BP: 132/76   Pulse: 110   Resp: (!) 22   Temp: 97.9 °F (36.6 °C)       Review of Systems   Constitutional:  Positive for appetite change (son reports poor appetite) and fatigue.   HENT: Negative.     Eyes: Negative.    Respiratory:  Positive for shortness of breath (with exertion and sats drop).    Cardiovascular: Negative.    Gastrointestinal:  Positive for abdominal pain.   Endocrine: Negative.    Genitourinary: Negative.    Musculoskeletal:  Positive for arthralgias and back pain.   Skin: Negative.    Allergic/Immunologic: Negative.    Neurological:  Positive for weakness.   Psychiatric/Behavioral:  Positive for sleep disturbance (son reports patient sleeps a lot during the day).        Physical Exam:  Physical Exam  Vitals reviewed.   Constitutional:       General: She is not in acute distress.     Appearance: She is ill-appearing.   HENT:      Head: Normocephalic and atraumatic.      Nose: Nose normal.      Mouth/Throat:      Mouth: Mucous membranes are moist.      Pharynx: Oropharynx is clear.   Cardiovascular:      Rate and Rhythm: Regular rhythm. Tachycardia present.      Heart sounds: Normal heart sounds.   Pulmonary:      Breath sounds: Normal breath sounds.   Abdominal:      Palpations: Abdomen is soft.   Musculoskeletal:         General: Normal range of motion.      Cervical back: Neck supple.   Skin:     General: Skin is warm and dry.   Neurological:      Mental Status: She is alert and oriented to person, place,  and time.   Psychiatric:         Mood and Affect: Mood is depressed.         Speech: Speech normal.         Behavior: Behavior is cooperative.         INSTRUCTIONS FOR PATIENT:     Scheduled Follow-up, Appts Reviewed with Modifications if Needed: Yes  Future Appointments   Date Time Provider Department Center   2/6/2024 11:00 AM Russel Rodriguez MD [facility] HEM ONC [facility]   3/1/2024  8:00 AM Carol Soriano NP [facility]       Encounter for Medical Follow-Up and Medication Review  - [facility] NP to schedule follow-up visit with patient in 4 weeks or PRN     Patient Instructions Given:  - Continue all medications, treatments and therapies as ordered.   - Follow all instructions, recommendations as discussed.  - Maintain Safety Precautions at all times.  - Attend all medical appointments as scheduled.  - For worsening symptoms: call Primary Care Physician or Nurse Practitioner.  - For emergencies, call 911 or immediately report to the nearest emergency room        Signature: Carol Soriano NP    Transition of Care Visit:  I have reviewed and updated the history and problem list.  I have reconciled the medication list.  I have discussed the hospitalization and current medical issues, prognosis and plans with the patient/family.

## 2024-02-05 NOTE — NURSING
1028 am: Outgoing call to pt regarding WQ referral from 1/29 per Dr. Moore for liver mets. Spoke with pt son. Pt son confirmed that pt wants to be seen here at [facility]. Pt was still awaiting review of records from [facility]. Pt scheduled for new Oncology appt on 2/6 at 11 am at  with Dr. Rodriguez. Pt son son given location directions. Pt son verbalized understanding. Pt plan to report to appt as scheduled.    Oncology Navigation   Intake  Date of Diagnosis: 01/25/24  Cancer Type: GI (liver adenocarcinoma)  Internal / External Referral: Internal (Dr. Moore)  Date of Referral: 01/29/24  Initial Nurse Navigator Contact: 01/31/24  Referral to Initial Contact Timeline (days): 2  Date Worked: 02/05/24  First Appointment Available: 02/06/24  Appointment Date: 02/06/24  Schedule to Appointment Timeline (days): 1  First Available Date vs. Scheduled Date (days): 0  Multiple appointments: No     Treatment  Current Status: Staging work-up       Medical Oncologist: Dr. Russel Rodriguez  Consult Date: 02/06/24       Procedures: Biopsy; CT  Biopsy Schedule Date: 01/26/24 (liver)  CT Schedule Date: 01/22/24 (Abd/pel w/o contrast)             Support Systems: Spouse/significant other; Children     Acuity      Follow Up  No follow-ups on file.

## 2024-02-06 PROBLEM — E43 SEVERE PROTEIN-CALORIE MALNUTRITION: Status: ACTIVE | Noted: 2024-01-01

## 2024-02-06 PROBLEM — R64 CACHEXIA: Status: ACTIVE | Noted: 2024-01-01

## 2024-02-06 PROBLEM — C78.00 SECONDARY LUNG CANCER: Status: ACTIVE | Noted: 2024-01-01

## 2024-02-06 PROBLEM — C78.7 SECONDARY LIVER CANCER: Status: ACTIVE | Noted: 2024-01-01

## 2024-02-06 PROBLEM — C22.1 CHOLANGIOCARCINOMA: Status: ACTIVE | Noted: 2024-01-01

## 2024-02-06 NOTE — PROGRESS NOTES
Subjective:       Patient ID: Tamy Allan is a 78 y.o. female.    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  Patient presents to the office ECOG status 4 brought in by her son the patient is barely able to keep her eyes open.    Past Medical History:   Diagnosis Date    Acquired hypothyroidism     Amblyopia     OD    Arthritis     Benign hypertension     Cardiomegaly     Cataract     Diabetes mellitus, type 2     Encounter for blood transfusion     Encounter for general adult medical examination without abnormal findings 08/31/2016    Hyperlipidemia     Hyperlipidemia     Hypertension     Refractive error     Type 2 diabetes mellitus     Vitamin D deficiency      Family History   Problem Relation Age of Onset    Heart disease Mother     Hypertension Mother     Heart disease Father     Hypertension Father     Heart disease Sister     Hypertension Sister     Heart disease Brother     Hypertension Brother      Social History     Socioeconomic History    Marital status:    Tobacco Use    Smoking status: Never    Smokeless tobacco: Never   Substance and Sexual Activity    Alcohol use: No    Drug use: No    Sexual activity: Not Currently     Past Surgical History:   Procedure Laterality Date    CARDIAC CATHETERIZATION      with stents    HYSTERECTOMY      TOTAL KNEE ARTHROPLASTY Left        Labs:  Lab Results   Component Value Date    WBC 21.94 (H) 01/28/2024    HGB 9.1 (L) 01/28/2024    HCT 29.9 (L) 01/28/2024    MCV 87 01/28/2024     01/28/2024     BMP  Lab Results   Component Value Date     (H) 01/28/2024    K 3.8 01/28/2024     (H) 01/28/2024    CO2 19 (L) 01/28/2024    BUN 17 01/28/2024    CREATININE 1.0 01/28/2024    CALCIUM 8.0 (L) 01/28/2024    ANIONGAP 11 01/28/2024    ESTGFRAFRICA 67 08/23/2023    EGFRNONAA 41 (L) 12/21/2021     Lab Results   Component Value Date  "   ALT 15 01/25/2024    AST 48 (H) 01/25/2024    ALKPHOS 414 (H) 01/25/2024    BILITOT 0.3 01/25/2024       Lab Results   Component Value Date    IRON 60 10/23/2019    TIBC 237 (L) 10/23/2019    FERRITIN 146.7 10/03/2023     No results found for: "MEZPOCJO58"  No results found for: "FOLATE"  Lab Results   Component Value Date    TSH 0.688 10/03/2023         Review of Systems   Constitutional:  Positive for activity change, appetite change, fatigue and unexpected weight change. Negative for chills, diaphoresis and fever.   HENT:  Negative for congestion, dental problem, drooling, ear discharge, ear pain, facial swelling, hearing loss, mouth sores, nosebleeds, postnasal drip, rhinorrhea, sinus pressure, sneezing, sore throat, tinnitus, trouble swallowing and voice change.    Eyes:  Negative for photophobia, pain, discharge, redness, itching and visual disturbance.   Respiratory:  Negative for cough, choking, chest tightness, shortness of breath, wheezing and stridor.    Cardiovascular:  Negative for chest pain, palpitations and leg swelling.   Gastrointestinal:  Positive for abdominal distention and abdominal pain. Negative for anal bleeding, blood in stool, constipation, diarrhea, nausea, rectal pain and vomiting.   Endocrine: Negative for cold intolerance, heat intolerance, polydipsia, polyphagia and polyuria.   Genitourinary:  Negative for decreased urine volume, difficulty urinating, dyspareunia, dysuria, enuresis, flank pain, frequency, genital sores, hematuria, menstrual problem, pelvic pain, urgency, vaginal bleeding, vaginal discharge and vaginal pain.   Musculoskeletal:  Negative for arthralgias, back pain, gait problem, joint swelling, myalgias, neck pain and neck stiffness.   Skin:  Negative for color change, pallor and rash.   Allergic/Immunologic: Negative for environmental allergies, food allergies and immunocompromised state.   Neurological:  Positive for weakness. Negative for dizziness, tremors, " seizures, syncope, facial asymmetry, speech difficulty, light-headedness, numbness and headaches.   Hematological:  Negative for adenopathy. Does not bruise/bleed easily.   Psychiatric/Behavioral:  Positive for confusion, decreased concentration and dysphoric mood. Negative for agitation, behavioral problems, hallucinations, self-injury, sleep disturbance and suicidal ideas. The patient is not nervous/anxious and is not hyperactive.        Objective:      Physical Exam  Vitals reviewed.   Constitutional:       General: She is not in acute distress.     Appearance: She is well-developed. She is cachectic. She is ill-appearing and toxic-appearing. She is not diaphoretic.   HENT:      Head: Normocephalic and atraumatic.      Right Ear: External ear normal.      Left Ear: External ear normal.      Nose: Nose normal.      Right Sinus: No maxillary sinus tenderness or frontal sinus tenderness.      Left Sinus: No maxillary sinus tenderness or frontal sinus tenderness.      Mouth/Throat:      Pharynx: No oropharyngeal exudate.   Eyes:      General: Lids are normal. No scleral icterus.        Right eye: No discharge.         Left eye: No discharge.      Conjunctiva/sclera: Conjunctivae normal.      Right eye: Right conjunctiva is not injected. No hemorrhage.     Left eye: Left conjunctiva is not injected. No hemorrhage.     Pupils: Pupils are equal, round, and reactive to light.   Neck:      Thyroid: No thyromegaly.      Vascular: No JVD.      Trachea: No tracheal deviation.   Cardiovascular:      Rate and Rhythm: Normal rate.   Pulmonary:      Effort: Pulmonary effort is normal. No respiratory distress.      Breath sounds: No stridor.   Chest:      Chest wall: No tenderness.   Abdominal:      General: Bowel sounds are normal. There is distension.      Palpations: Abdomen is soft. There is no hepatomegaly, splenomegaly or mass.      Tenderness: There is abdominal tenderness. There is no rebound.   Musculoskeletal:          General: No tenderness. Normal range of motion.      Cervical back: Normal range of motion and neck supple.   Lymphadenopathy:      Cervical: No cervical adenopathy.      Upper Body:      Right upper body: No supraclavicular adenopathy.      Left upper body: No supraclavicular adenopathy.   Skin:     General: Skin is dry.      Findings: No erythema or rash.   Neurological:      Mental Status: She is alert and oriented to person, place, and time.      Cranial Nerves: No cranial nerve deficit.      Motor: Weakness present.      Coordination: Coordination abnormal.      Gait: Gait abnormal.   Psychiatric:         Behavior: Behavior normal.         Thought Content: Thought content normal.         Judgment: Judgment normal.             Assessment:      1. Metastatic disease    2. Cholangiocarcinoma    3. Secondary liver cancer    4. Malignant neoplasm metastatic to both lungs    5. Cachexia    6. Severe protein-calorie malnutrition           Med Onc Chart Routing      Follow up with physician    Follow up with JOCELYN    Infusion scheduling note    Injection scheduling note    Labs    Imaging    Pharmacy appointment    Other referrals         Referral to [facility] ASAP              Plan:     Reviewed information with her son who brought her here.  Unfortunately patient has widespread disease ECOG status 4.  High likelihood this represents a cholangiocarcinoma with metastatic disease to liver and lung.  ECOG status 4 would strongly recommend hospice care referral has been made [facility] close home and inpatient unit present.  Discussed implications with her I reviewed images personally with her son demonstrating widespread nature of her malignancy.  A copy of path report has been given to him as well.  Discussed implications of answered questions.  He is stands the gravity of the situation and the shortness of the patient's life she has recently been prescribed [place] which I agree hospice will take over  palliation will ask that hospice see the patient today as soon as possible for admissionAdvance Care Planning         Russel Rodriguez Jr, MD FACP

## 2024-02-06 NOTE — PROGRESS NOTES
Chase was consulted to assist with pt hospice referral to [facility]. Pt to benefit from these services as soon as possible. Swer called agency to arrange for pt admit today. Swer spoke to [place] and provided pt.'s son, KANE telephone number ([phone]). [facility] reports she will notify their admit coordinator, Carmella to call pt.'s son for admit and will return swer call to provide update. Pipor to provide update to MD and will remain available.   12:30 pm - Swer received return call from Carmella, reporting agency will admit today and nurse is in route to meet pt at home for admit. Swer updated appropriate staff.

## 2024-03-04 ENCOUNTER — TELEPHONE (OUTPATIENT)
Dept: HEMATOLOGY/ONCOLOGY | Facility: CLINIC | Age: 79
End: 2024-03-04
Payer: MEDICARE

## 2024-03-04 NOTE — TELEPHONE ENCOUNTER
Swer was notified by [facility], pt passed 2/29/24. Swer updated pt chart accordingly. Swer notified appropriate staff. Condolences sent to family.

## 2024-03-05 LAB
DNA RANGE(S) EXAMINED NAR: NORMAL
GENE DIS ANL INTERP-IMP: POSITIVE
GENE DIS ASSESSED: NORMAL
GENE MUT TESTED BLD/T: 3.7 M/MB
MSI CA SPEC-IMP: NORMAL
REASON FOR STUDY: NORMAL
TEMPUS FUSIONADDENDUM: NORMAL
TEMPUS LCA: NORMAL
TEMPUS PORTAL: NORMAL
TEMPUS THERAPY1: NORMAL
TEMPUS THERAPY2: NORMAL
TEMPUS THERAPY3: NORMAL
TEMPUS THERAPYCOUNT: 3
TEMPUS TRIAL1: NORMAL
TEMPUS TRIAL2: NORMAL
TEMPUS TRIAL3: NORMAL
TEMPUS TRIALCOUNT: 3

## 2024-04-03 ENCOUNTER — EXTERNAL HOME HEALTH (OUTPATIENT)
Dept: HOME HEALTH SERVICES | Facility: HOSPITAL | Age: 79
End: 2024-04-03
Payer: MEDICARE

## 2024-04-10 ENCOUNTER — DOCUMENT SCAN (OUTPATIENT)
Dept: HOME HEALTH SERVICES | Facility: HOSPITAL | Age: 79
End: 2024-04-10
Payer: MEDICARE